# Patient Record
Sex: FEMALE | Race: WHITE | NOT HISPANIC OR LATINO | Employment: FULL TIME | URBAN - NONMETROPOLITAN AREA
[De-identification: names, ages, dates, MRNs, and addresses within clinical notes are randomized per-mention and may not be internally consistent; named-entity substitution may affect disease eponyms.]

---

## 2018-04-04 ENCOUNTER — OFFICE VISIT CONVERTED (OUTPATIENT)
Dept: FAMILY MEDICINE CLINIC | Age: 43
End: 2018-04-04
Attending: FAMILY MEDICINE

## 2018-06-18 ENCOUNTER — CONVERSION ENCOUNTER (OUTPATIENT)
Dept: OTHER | Facility: HOSPITAL | Age: 43
End: 2018-06-18

## 2018-06-27 ENCOUNTER — OFFICE VISIT CONVERTED (OUTPATIENT)
Dept: FAMILY MEDICINE CLINIC | Age: 43
End: 2018-06-27
Attending: FAMILY MEDICINE

## 2018-12-17 ENCOUNTER — OFFICE VISIT CONVERTED (OUTPATIENT)
Dept: FAMILY MEDICINE CLINIC | Age: 43
End: 2018-12-17
Attending: FAMILY MEDICINE

## 2019-04-16 ENCOUNTER — HOSPITAL ENCOUNTER (OUTPATIENT)
Dept: OTHER | Facility: HOSPITAL | Age: 44
Discharge: HOME | End: 2019-04-16
Attending: FAMILY MEDICINE

## 2019-04-23 ENCOUNTER — HOSPITAL ENCOUNTER (OUTPATIENT)
Dept: OTHER | Facility: HOSPITAL | Age: 44
Discharge: HOME | End: 2019-04-23
Attending: NURSE PRACTITIONER

## 2019-04-23 ENCOUNTER — OFFICE VISIT CONVERTED (OUTPATIENT)
Dept: FAMILY MEDICINE CLINIC | Age: 44
End: 2019-04-23
Attending: NURSE PRACTITIONER

## 2019-04-23 LAB
APPEARANCE UR: CLEAR
BACTERIA UR QL AUTO: NORMAL
BILIRUB UR QL: NEGATIVE
CASTS URNS QL MICRO: NORMAL /[LPF]
COLOR UR: YELLOW
CONV LEUKOCYTE ESTERASE: NEGATIVE
CONV UROBILINOGEN IN URINE BY AUTOMATED TEST STRIP: 0.2 {EHRLICHU}/DL (ref 0.1–1)
EPI CELLS #/AREA URNS HPF: NORMAL /[HPF]
GLUCOSE 24H UR-MCNC: NEGATIVE MG/DL
HGB UR QL STRIP: NEGATIVE
KETONES UR QL STRIP: NEGATIVE MG/DL
MUCOUS THREADS URNS QL MICRO: NORMAL
NITRITE UR-MCNC: NEGATIVE MG/ML
PH UR STRIP.AUTO: 6.5 [PH] (ref 5–8)
PROT UR-MCNC: NEGATIVE MG/DL
RBC # BLD AUTO: NORMAL /[HPF]
SP GR UR STRIP: 1.01 (ref 1–1.03)
SPECIMEN SOURCE: NORMAL
TSH SERPL-ACNC: 0.8 M[IU]/L (ref 0.27–4.2)
UNIDENT CRYS URNS QL MICRO: NORMAL /[HPF]
VIT B12 SERPL-MCNC: 279 PG/ML (ref 211–911)
WBC #/AREA URNS HPF: NORMAL /[HPF]

## 2019-04-24 LAB
C DIFF TOX B STL QL CT TISS CULT: NEGATIVE
CONV 027 TOXIN: NEGATIVE

## 2019-04-26 LAB — BACTERIA SPEC AEROBE CULT: NORMAL

## 2019-05-06 ENCOUNTER — HOSPITAL ENCOUNTER (OUTPATIENT)
Dept: OTHER | Facility: HOSPITAL | Age: 44
Discharge: HOME | End: 2019-05-06
Attending: NURSE PRACTITIONER

## 2019-06-04 ENCOUNTER — OFFICE VISIT CONVERTED (OUTPATIENT)
Dept: FAMILY MEDICINE CLINIC | Age: 44
End: 2019-06-04
Attending: NURSE PRACTITIONER

## 2019-06-04 ENCOUNTER — HOSPITAL ENCOUNTER (OUTPATIENT)
Dept: OTHER | Facility: HOSPITAL | Age: 44
Discharge: HOME | End: 2019-06-04
Attending: NURSE PRACTITIONER

## 2019-06-04 LAB
ALBUMIN SERPL-MCNC: 4.3 G/DL (ref 3.5–5)
ALBUMIN/GLOB SERPL: 1.7 {RATIO} (ref 1.4–2.6)
ALP SERPL-CCNC: 58 U/L (ref 42–98)
ALT SERPL-CCNC: 8 U/L (ref 10–40)
ANION GAP SERPL CALC-SCNC: 16 MMOL/L (ref 8–19)
AST SERPL-CCNC: 9 U/L (ref 15–50)
BASOPHILS # BLD AUTO: 0.04 10*3/UL (ref 0–0.2)
BASOPHILS NFR BLD AUTO: 0.4 % (ref 0–3)
BILIRUB SERPL-MCNC: <0.15 MG/DL (ref 0.2–1.3)
BUN SERPL-MCNC: 9 MG/DL (ref 5–25)
BUN/CREAT SERPL: 13 {RATIO} (ref 6–20)
CALCIUM SERPL-MCNC: 9.2 MG/DL (ref 8.7–10.4)
CHLORIDE SERPL-SCNC: 104 MMOL/L (ref 99–111)
CONV ABS IMM GRAN: 0.03 10*3/UL (ref 0–0.2)
CONV CO2: 25 MMOL/L (ref 22–32)
CONV IMMATURE GRAN: 0.3 % (ref 0–1.8)
CONV TOTAL PROTEIN: 6.9 G/DL (ref 6.3–8.2)
CREAT UR-MCNC: 0.7 MG/DL (ref 0.5–0.9)
DEPRECATED RDW RBC AUTO: 44.8 FL (ref 36.4–46.3)
EOSINOPHIL # BLD AUTO: 0.16 10*3/UL (ref 0–0.7)
EOSINOPHIL # BLD AUTO: 1.6 % (ref 0–7)
ERYTHROCYTE [DISTWIDTH] IN BLOOD BY AUTOMATED COUNT: 12.5 % (ref 11.7–14.4)
GFR SERPLBLD BASED ON 1.73 SQ M-ARVRAT: >60 ML/MIN/{1.73_M2}
GLOBULIN UR ELPH-MCNC: 2.6 G/DL (ref 2–3.5)
GLUCOSE SERPL-MCNC: 93 MG/DL (ref 65–99)
HBA1C MFR BLD: 12.3 G/DL (ref 12–16)
HCT VFR BLD AUTO: 38.8 % (ref 37–47)
LYMPHOCYTES # BLD AUTO: 4.69 10*3/UL (ref 1–5)
MCH RBC QN AUTO: 30.8 PG (ref 27–31)
MCHC RBC AUTO-ENTMCNC: 31.7 G/DL (ref 33–37)
MCV RBC AUTO: 97.2 FL (ref 81–99)
MONOCYTES # BLD AUTO: 0.82 10*3/UL (ref 0.2–1.2)
MONOCYTES NFR BLD AUTO: 8.1 % (ref 3–10)
NEUTROPHILS # BLD AUTO: 4.33 10*3/UL (ref 2–8)
NEUTROPHILS NFR BLD AUTO: 43 % (ref 30–85)
NRBC CBCN: 0 % (ref 0–0.7)
OSMOLALITY SERPL CALC.SUM OF ELEC: 290 MOSM/KG (ref 273–304)
PLATELET # BLD AUTO: 296 10*3/UL (ref 130–400)
PMV BLD AUTO: 11.4 FL (ref 9.4–12.3)
POTASSIUM SERPL-SCNC: 3.6 MMOL/L (ref 3.5–5.3)
RBC # BLD AUTO: 3.99 10*6/UL (ref 4.2–5.4)
SODIUM SERPL-SCNC: 141 MMOL/L (ref 135–147)
VARIANT LYMPHS NFR BLD MANUAL: 46.6 % (ref 20–45)
WBC # BLD AUTO: 10.07 10*3/UL (ref 4.8–10.8)

## 2019-06-10 ENCOUNTER — OFFICE VISIT CONVERTED (OUTPATIENT)
Dept: FAMILY MEDICINE CLINIC | Age: 44
End: 2019-06-10
Attending: FAMILY MEDICINE

## 2019-06-17 ENCOUNTER — CONVERSION ENCOUNTER (OUTPATIENT)
Dept: SURGERY | Facility: CLINIC | Age: 44
End: 2019-06-17

## 2019-06-17 ENCOUNTER — OFFICE VISIT CONVERTED (OUTPATIENT)
Dept: SURGERY | Facility: CLINIC | Age: 44
End: 2019-06-17
Attending: NURSE PRACTITIONER

## 2019-06-20 ENCOUNTER — OFFICE VISIT CONVERTED (OUTPATIENT)
Dept: FAMILY MEDICINE CLINIC | Age: 44
End: 2019-06-20
Attending: FAMILY MEDICINE

## 2019-06-21 ENCOUNTER — HOSPITAL ENCOUNTER (OUTPATIENT)
Dept: SURGERY | Facility: CLINIC | Age: 44
Discharge: HOME | End: 2019-06-21
Attending: NURSE PRACTITIONER

## 2019-06-22 LAB
C DIFF TOX B STL QL CT TISS CULT: NEGATIVE
CONV 027 TOXIN: NEGATIVE

## 2019-07-18 ENCOUNTER — HOSPITAL ENCOUNTER (OUTPATIENT)
Dept: GASTROENTEROLOGY | Facility: HOSPITAL | Age: 44
Setting detail: HOSPITAL OUTPATIENT SURGERY
Discharge: HOME | End: 2019-07-18
Attending: SURGERY

## 2019-08-05 ENCOUNTER — OFFICE VISIT CONVERTED (OUTPATIENT)
Dept: SURGERY | Facility: CLINIC | Age: 44
End: 2019-08-05
Attending: NURSE PRACTITIONER

## 2019-08-05 ENCOUNTER — CONVERSION ENCOUNTER (OUTPATIENT)
Dept: SURGERY | Facility: CLINIC | Age: 44
End: 2019-08-05

## 2019-08-20 ENCOUNTER — OFFICE VISIT CONVERTED (OUTPATIENT)
Dept: FAMILY MEDICINE CLINIC | Age: 44
End: 2019-08-20
Attending: NURSE PRACTITIONER

## 2019-08-25 ENCOUNTER — APPOINTMENT (OUTPATIENT)
Dept: CARDIOLOGY | Facility: HOSPITAL | Age: 44
End: 2019-08-25

## 2019-08-25 ENCOUNTER — APPOINTMENT (OUTPATIENT)
Dept: GENERAL RADIOLOGY | Facility: HOSPITAL | Age: 44
End: 2019-08-25

## 2019-08-25 ENCOUNTER — HOSPITAL ENCOUNTER (EMERGENCY)
Facility: HOSPITAL | Age: 44
Discharge: HOME | End: 2019-08-25
Attending: EMERGENCY MEDICINE | Admitting: EMERGENCY MEDICINE

## 2019-08-25 VITALS
DIASTOLIC BLOOD PRESSURE: 73 MMHG | OXYGEN SATURATION: 98 % | SYSTOLIC BLOOD PRESSURE: 108 MMHG | TEMPERATURE: 97.9 F | WEIGHT: 156 LBS | RESPIRATION RATE: 16 BRPM | HEART RATE: 72 BPM | BODY MASS INDEX: 25.07 KG/M2 | HEIGHT: 66 IN

## 2019-08-25 DIAGNOSIS — R07.89 ATYPICAL CHEST PAIN: ICD-10-CM

## 2019-08-25 DIAGNOSIS — R00.2 PALPITATION: Primary | ICD-10-CM

## 2019-08-25 LAB
ALBUMIN SERPL-MCNC: 4.5 G/DL (ref 3.5–5.2)
ALBUMIN/GLOB SERPL: 1.7 G/DL
ALP SERPL-CCNC: 71 U/L (ref 39–117)
ALT SERPL W P-5'-P-CCNC: 8 U/L (ref 1–33)
ANION GAP SERPL CALCULATED.3IONS-SCNC: 9.3 MMOL/L (ref 5–15)
AST SERPL-CCNC: 13 U/L (ref 1–32)
BASOPHILS # BLD AUTO: 0.03 10*3/MM3 (ref 0–0.2)
BASOPHILS NFR BLD AUTO: 0.3 % (ref 0–1.5)
BILIRUB SERPL-MCNC: <0.2 MG/DL (ref 0.2–1.2)
BUN BLD-MCNC: 7 MG/DL (ref 6–20)
BUN/CREAT SERPL: 10.1 (ref 7–25)
CALCIUM SPEC-SCNC: 8.9 MG/DL (ref 8.6–10.5)
CHLORIDE SERPL-SCNC: 105 MMOL/L (ref 98–107)
CO2 SERPL-SCNC: 25.7 MMOL/L (ref 22–29)
CREAT BLD-MCNC: 0.69 MG/DL (ref 0.57–1)
DEPRECATED RDW RBC AUTO: 43.8 FL (ref 37–54)
EOSINOPHIL # BLD AUTO: 0.13 10*3/MM3 (ref 0–0.4)
EOSINOPHIL NFR BLD AUTO: 1.4 % (ref 0.3–6.2)
ERYTHROCYTE [DISTWIDTH] IN BLOOD BY AUTOMATED COUNT: 12.6 % (ref 12.3–15.4)
GFR SERPL CREATININE-BSD FRML MDRD: 92 ML/MIN/1.73
GLOBULIN UR ELPH-MCNC: 2.7 GM/DL
GLUCOSE BLD-MCNC: 109 MG/DL (ref 65–99)
HCT VFR BLD AUTO: 39.5 % (ref 34–46.6)
HGB BLD-MCNC: 13 G/DL (ref 12–15.9)
HOLD SPECIMEN: NORMAL
HOLD SPECIMEN: NORMAL
IMM GRANULOCYTES # BLD AUTO: 0.02 10*3/MM3 (ref 0–0.05)
IMM GRANULOCYTES NFR BLD AUTO: 0.2 % (ref 0–0.5)
LYMPHOCYTES # BLD AUTO: 3.31 10*3/MM3 (ref 0.7–3.1)
LYMPHOCYTES NFR BLD AUTO: 36.2 % (ref 19.6–45.3)
MAGNESIUM SERPL-MCNC: 2.4 MG/DL (ref 1.6–2.6)
MCH RBC QN AUTO: 31.3 PG (ref 26.6–33)
MCHC RBC AUTO-ENTMCNC: 32.9 G/DL (ref 31.5–35.7)
MCV RBC AUTO: 95.2 FL (ref 79–97)
MONOCYTES # BLD AUTO: 0.75 10*3/MM3 (ref 0.1–0.9)
MONOCYTES NFR BLD AUTO: 8.2 % (ref 5–12)
NEUTROPHILS # BLD AUTO: 4.9 10*3/MM3 (ref 1.7–7)
NEUTROPHILS NFR BLD AUTO: 53.7 % (ref 42.7–76)
NRBC BLD AUTO-RTO: 0 /100 WBC (ref 0–0.2)
PLATELET # BLD AUTO: 296 10*3/MM3 (ref 140–450)
PMV BLD AUTO: 10.8 FL (ref 6–12)
POTASSIUM BLD-SCNC: 3.8 MMOL/L (ref 3.5–5.2)
PROT SERPL-MCNC: 7.2 G/DL (ref 6–8.5)
RBC # BLD AUTO: 4.15 10*6/MM3 (ref 3.77–5.28)
SODIUM BLD-SCNC: 140 MMOL/L (ref 136–145)
T4 FREE SERPL-MCNC: 1.25 NG/DL (ref 0.93–1.7)
TROPONIN T SERPL-MCNC: <0.01 NG/ML (ref 0–0.03)
TROPONIN T SERPL-MCNC: <0.01 NG/ML (ref 0–0.03)
TSH SERPL DL<=0.05 MIU/L-ACNC: 0.85 MIU/ML (ref 0.27–4.2)
WBC NRBC COR # BLD: 9.14 10*3/MM3 (ref 3.4–10.8)
WHOLE BLOOD HOLD SPECIMEN: NORMAL
WHOLE BLOOD HOLD SPECIMEN: NORMAL

## 2019-08-25 PROCEDURE — 93005 ELECTROCARDIOGRAM TRACING: CPT | Performed by: EMERGENCY MEDICINE

## 2019-08-25 PROCEDURE — 71046 X-RAY EXAM CHEST 2 VIEWS: CPT

## 2019-08-25 PROCEDURE — 93005 ELECTROCARDIOGRAM TRACING: CPT

## 2019-08-25 PROCEDURE — 84443 ASSAY THYROID STIM HORMONE: CPT | Performed by: EMERGENCY MEDICINE

## 2019-08-25 PROCEDURE — 93010 ELECTROCARDIOGRAM REPORT: CPT | Performed by: INTERNAL MEDICINE

## 2019-08-25 PROCEDURE — 84439 ASSAY OF FREE THYROXINE: CPT | Performed by: EMERGENCY MEDICINE

## 2019-08-25 PROCEDURE — 0296T HC EXT ECG > 48HR TO 21 DAY RCRD W/CONECT INTL RCRD: CPT

## 2019-08-25 PROCEDURE — 99283 EMERGENCY DEPT VISIT LOW MDM: CPT

## 2019-08-25 PROCEDURE — 84484 ASSAY OF TROPONIN QUANT: CPT | Performed by: EMERGENCY MEDICINE

## 2019-08-25 PROCEDURE — 80053 COMPREHEN METABOLIC PANEL: CPT | Performed by: EMERGENCY MEDICINE

## 2019-08-25 PROCEDURE — 83735 ASSAY OF MAGNESIUM: CPT | Performed by: EMERGENCY MEDICINE

## 2019-08-25 PROCEDURE — 85025 COMPLETE CBC W/AUTO DIFF WBC: CPT | Performed by: EMERGENCY MEDICINE

## 2019-08-25 RX ORDER — FLUOXETINE 10 MG/1
10 CAPSULE ORAL DAILY
COMMUNITY
End: 2021-08-24

## 2019-08-25 RX ORDER — LEVOTHYROXINE SODIUM 0.03 MG/1
25 TABLET ORAL DAILY
COMMUNITY
End: 2021-10-18 | Stop reason: ALTCHOICE

## 2019-08-25 RX ORDER — SODIUM CHLORIDE 0.9 % (FLUSH) 0.9 %
10 SYRINGE (ML) INJECTION AS NEEDED
Status: DISCONTINUED | OUTPATIENT
Start: 2019-08-25 | End: 2019-08-25 | Stop reason: HOSPADM

## 2019-08-25 RX ORDER — GABAPENTIN 100 MG/1
600 CAPSULE ORAL DAILY
COMMUNITY
End: 2021-09-01

## 2019-08-25 RX ORDER — OMEPRAZOLE 40 MG/1
40 CAPSULE, DELAYED RELEASE ORAL DAILY
COMMUNITY

## 2019-08-28 ENCOUNTER — CONVERSION ENCOUNTER (OUTPATIENT)
Dept: OTHER | Facility: HOSPITAL | Age: 44
End: 2019-08-28

## 2019-08-28 ENCOUNTER — OFFICE VISIT CONVERTED (OUTPATIENT)
Dept: CARDIOLOGY | Facility: CLINIC | Age: 44
End: 2019-08-28
Attending: INTERNAL MEDICINE

## 2019-08-31 ENCOUNTER — OFFICE VISIT CONVERTED (OUTPATIENT)
Dept: FAMILY MEDICINE CLINIC | Age: 44
End: 2019-08-31
Attending: NURSE PRACTITIONER

## 2019-09-04 ENCOUNTER — CONVERSION ENCOUNTER (OUTPATIENT)
Dept: CARDIOLOGY | Facility: CLINIC | Age: 44
End: 2019-09-04
Attending: INTERNAL MEDICINE

## 2019-09-11 ENCOUNTER — CONVERSION ENCOUNTER (OUTPATIENT)
Dept: CARDIOLOGY | Facility: CLINIC | Age: 44
End: 2019-09-11

## 2019-09-11 ENCOUNTER — OFFICE VISIT CONVERTED (OUTPATIENT)
Dept: CARDIOLOGY | Facility: CLINIC | Age: 44
End: 2019-09-11
Attending: INTERNAL MEDICINE

## 2019-09-16 ENCOUNTER — OFFICE VISIT CONVERTED (OUTPATIENT)
Dept: FAMILY MEDICINE CLINIC | Age: 44
End: 2019-09-16
Attending: NURSE PRACTITIONER

## 2019-09-17 ENCOUNTER — HOSPITAL ENCOUNTER (OUTPATIENT)
Dept: OTHER | Facility: HOSPITAL | Age: 44
Discharge: HOME | End: 2019-09-17

## 2019-09-17 LAB
FSH SERPL-ACNC: 7.7 M[IU]/ML
LH SERPL-ACNC: 8.3 M[IU]/ML
T4 FREE SERPL-MCNC: 1.3 NG/DL (ref 0.9–1.8)
TSH SERPL-ACNC: 0.51 M[IU]/L (ref 0.27–4.2)

## 2019-09-19 LAB — PROGEST SERPL-MCNC: 0.2 NG/ML

## 2019-09-21 LAB — CONV ESTROGENS, TOTAL, SERUM: 460 PG/ML

## 2019-09-24 ENCOUNTER — OFFICE VISIT CONVERTED (OUTPATIENT)
Dept: FAMILY MEDICINE CLINIC | Age: 44
End: 2019-09-24
Attending: FAMILY MEDICINE

## 2019-10-23 ENCOUNTER — OFFICE VISIT CONVERTED (OUTPATIENT)
Dept: CARDIOLOGY | Facility: CLINIC | Age: 44
End: 2019-10-23
Attending: INTERNAL MEDICINE

## 2019-10-23 ENCOUNTER — CONVERSION ENCOUNTER (OUTPATIENT)
Dept: CARDIOLOGY | Facility: CLINIC | Age: 44
End: 2019-10-23

## 2019-11-07 ENCOUNTER — OFFICE VISIT CONVERTED (OUTPATIENT)
Dept: FAMILY MEDICINE CLINIC | Age: 44
End: 2019-11-07
Attending: FAMILY MEDICINE

## 2019-11-14 ENCOUNTER — OFFICE VISIT CONVERTED (OUTPATIENT)
Dept: FAMILY MEDICINE CLINIC | Age: 44
End: 2019-11-14
Attending: FAMILY MEDICINE

## 2019-11-20 ENCOUNTER — HOSPITAL ENCOUNTER (OUTPATIENT)
Dept: OTHER | Facility: HOSPITAL | Age: 44
Discharge: HOME | End: 2019-11-20
Attending: FAMILY MEDICINE

## 2019-11-20 LAB
T4 FREE SERPL-MCNC: 1 NG/DL (ref 0.9–1.8)
TSH SERPL-ACNC: 0.92 M[IU]/L (ref 0.27–4.2)

## 2019-11-22 LAB — T3 SERPL-MCNC: 116 NG/DL (ref 71–180)

## 2020-01-13 ENCOUNTER — HOSPITAL ENCOUNTER (OUTPATIENT)
Dept: OTHER | Facility: HOSPITAL | Age: 45
Discharge: HOME | End: 2020-01-13
Attending: FAMILY MEDICINE

## 2020-02-07 ENCOUNTER — OFFICE VISIT CONVERTED (OUTPATIENT)
Dept: FAMILY MEDICINE CLINIC | Age: 45
End: 2020-02-07
Attending: FAMILY MEDICINE

## 2020-02-13 ENCOUNTER — HOSPITAL ENCOUNTER (OUTPATIENT)
Dept: OTHER | Facility: HOSPITAL | Age: 45
Discharge: HOME | End: 2020-02-13
Attending: FAMILY MEDICINE

## 2020-02-13 LAB
ALBUMIN SERPL-MCNC: 4 G/DL (ref 3.5–5)
ALBUMIN/GLOB SERPL: 1.5 {RATIO} (ref 1.4–2.6)
ALP SERPL-CCNC: 53 U/L (ref 42–98)
ALT SERPL-CCNC: 15 U/L (ref 10–40)
ANION GAP SERPL CALC-SCNC: 14 MMOL/L (ref 8–19)
AST SERPL-CCNC: 14 U/L (ref 15–50)
BILIRUB SERPL-MCNC: 0.21 MG/DL (ref 0.2–1.3)
BUN SERPL-MCNC: 10 MG/DL (ref 5–25)
BUN/CREAT SERPL: 13 {RATIO} (ref 6–20)
CALCIUM SERPL-MCNC: 9.3 MG/DL (ref 8.7–10.4)
CHLORIDE SERPL-SCNC: 101 MMOL/L (ref 99–111)
CHOLEST SERPL-MCNC: 195 MG/DL (ref 107–200)
CHOLEST/HDLC SERPL: 3 {RATIO} (ref 3–6)
CONV CO2: 24 MMOL/L (ref 22–32)
CONV TOTAL PROTEIN: 6.6 G/DL (ref 6.3–8.2)
CREAT UR-MCNC: 0.75 MG/DL (ref 0.5–0.9)
GFR SERPLBLD BASED ON 1.73 SQ M-ARVRAT: >60 ML/MIN/{1.73_M2}
GLOBULIN UR ELPH-MCNC: 2.6 G/DL (ref 2–3.5)
GLUCOSE SERPL-MCNC: 112 MG/DL (ref 65–99)
HDLC SERPL-MCNC: 64 MG/DL (ref 40–60)
LDLC SERPL CALC-MCNC: 117 MG/DL (ref 70–100)
OSMOLALITY SERPL CALC.SUM OF ELEC: 280 MOSM/KG (ref 273–304)
POTASSIUM SERPL-SCNC: 4.2 MMOL/L (ref 3.5–5.3)
SODIUM SERPL-SCNC: 135 MMOL/L (ref 135–147)
TRIGL SERPL-MCNC: 72 MG/DL (ref 40–150)
TSH SERPL-ACNC: 0.95 M[IU]/L (ref 0.27–4.2)
VLDLC SERPL-MCNC: 14 MG/DL (ref 5–37)

## 2020-05-14 ENCOUNTER — OFFICE VISIT CONVERTED (OUTPATIENT)
Dept: FAMILY MEDICINE CLINIC | Age: 45
End: 2020-05-14
Attending: FAMILY MEDICINE

## 2020-07-10 ENCOUNTER — HOSPITAL ENCOUNTER (OUTPATIENT)
Dept: OTHER | Facility: HOSPITAL | Age: 45
Discharge: HOME | End: 2020-07-10
Attending: NURSE PRACTITIONER

## 2020-07-10 ENCOUNTER — OFFICE VISIT CONVERTED (OUTPATIENT)
Dept: FAMILY MEDICINE CLINIC | Age: 45
End: 2020-07-10
Attending: NURSE PRACTITIONER

## 2020-07-12 LAB
AMOXICILLIN+CLAV SUSC ISLT: >=32
AMPICILLIN SUSC ISLT: >=32
AMPICILLIN+SULBAC SUSC ISLT: >=32
BACTERIA UR CULT: ABNORMAL
CEFAZOLIN SUSC ISLT: 8
CEFEPIME SUSC ISLT: <=1
CEFTAZIDIME SUSC ISLT: <=1
CEFTRIAXONE SUSC ISLT: <=1
CEFUROXIME ORAL SUSC ISLT: 4
CEFUROXIME PARENTER SUSC ISLT: 4
CIPROFLOXACIN SUSC ISLT: <=0.25
ERTAPENEM SUSC ISLT: <=0.5
GENTAMICIN SUSC ISLT: >=16
LEVOFLOXACIN SUSC ISLT: <=0.12
NITROFURANTOIN SUSC ISLT: <=16
TETRACYCLINE SUSC ISLT: >=16
TMP SMX SUSC ISLT: >=320
TOBRAMYCIN SUSC ISLT: 8

## 2020-08-07 ENCOUNTER — OFFICE VISIT CONVERTED (OUTPATIENT)
Dept: FAMILY MEDICINE CLINIC | Age: 45
End: 2020-08-07
Attending: FAMILY MEDICINE

## 2020-10-23 ENCOUNTER — OFFICE VISIT CONVERTED (OUTPATIENT)
Dept: FAMILY MEDICINE CLINIC | Age: 45
End: 2020-10-23
Attending: NURSE PRACTITIONER

## 2020-12-23 ENCOUNTER — OFFICE VISIT CONVERTED (OUTPATIENT)
Dept: FAMILY MEDICINE CLINIC | Age: 45
End: 2020-12-23
Attending: NURSE PRACTITIONER

## 2021-02-08 ENCOUNTER — HOSPITAL ENCOUNTER (OUTPATIENT)
Dept: OTHER | Facility: HOSPITAL | Age: 46
Discharge: HOME | End: 2021-02-08
Attending: FAMILY MEDICINE

## 2021-02-10 ENCOUNTER — OFFICE VISIT CONVERTED (OUTPATIENT)
Dept: FAMILY MEDICINE CLINIC | Age: 46
End: 2021-02-10
Attending: FAMILY MEDICINE

## 2021-02-10 LAB — SARS-COV-2 RNA SPEC QL NAA+PROBE: DETECTED

## 2021-03-03 ENCOUNTER — OFFICE VISIT CONVERTED (OUTPATIENT)
Dept: FAMILY MEDICINE CLINIC | Age: 46
End: 2021-03-03
Attending: NURSE PRACTITIONER

## 2021-04-13 ENCOUNTER — OFFICE VISIT CONVERTED (OUTPATIENT)
Dept: FAMILY MEDICINE CLINIC | Age: 46
End: 2021-04-13
Attending: NURSE PRACTITIONER

## 2021-04-13 ENCOUNTER — HOSPITAL ENCOUNTER (OUTPATIENT)
Dept: OTHER | Facility: HOSPITAL | Age: 46
Discharge: HOME | End: 2021-04-13
Attending: NURSE PRACTITIONER

## 2021-04-13 LAB
ALBUMIN SERPL-MCNC: 4.2 G/DL (ref 3.5–5)
ALBUMIN/GLOB SERPL: 1.4 {RATIO} (ref 1.4–2.6)
ALP SERPL-CCNC: 60 U/L (ref 42–98)
ALT SERPL-CCNC: 12 U/L (ref 10–40)
ANION GAP SERPL CALC-SCNC: 16 MMOL/L (ref 8–19)
APPEARANCE UR: CLEAR
AST SERPL-CCNC: 14 U/L (ref 15–50)
BACTERIA UR QL AUTO: ABNORMAL
BILIRUB SERPL-MCNC: 0.19 MG/DL (ref 0.2–1.3)
BILIRUB UR QL: NEGATIVE
BUN SERPL-MCNC: 8 MG/DL (ref 5–25)
BUN/CREAT SERPL: 11 {RATIO} (ref 6–20)
CALCIUM SERPL-MCNC: 9 MG/DL (ref 8.7–10.4)
CASTS URNS QL MICRO: ABNORMAL /[LPF]
CHLORIDE SERPL-SCNC: 104 MMOL/L (ref 99–111)
COLOR UR: YELLOW
CONV CO2: 23 MMOL/L (ref 22–32)
CONV LEUKOCYTE ESTERASE: NEGATIVE
CONV TOTAL PROTEIN: 7.1 G/DL (ref 6.3–8.2)
CONV UROBILINOGEN IN URINE BY AUTOMATED TEST STRIP: 0.2 {EHRLICHU}/DL (ref 0.1–1)
CREAT UR-MCNC: 0.76 MG/DL (ref 0.5–0.9)
EPI CELLS #/AREA URNS HPF: ABNORMAL /[HPF]
ERYTHROCYTE [DISTWIDTH] IN BLOOD BY AUTOMATED COUNT: 13 % (ref 11.5–14.5)
GFR SERPLBLD BASED ON 1.73 SQ M-ARVRAT: >60 ML/MIN/{1.73_M2}
GLOBULIN UR ELPH-MCNC: 2.9 G/DL (ref 2–3.5)
GLUCOSE 24H UR-MCNC: NEGATIVE MG/DL
GLUCOSE SERPL-MCNC: 94 MG/DL (ref 65–99)
HBA1C MFR BLD: 13.1 G/DL (ref 12–16)
HCT VFR BLD AUTO: 40.4 % (ref 37–47)
HGB UR QL STRIP: NEGATIVE
KETONES UR QL STRIP: NEGATIVE MG/DL
MCH RBC QN AUTO: 30.8 PG (ref 27–31)
MCHC RBC AUTO-ENTMCNC: 32.4 G/DL (ref 33–37)
MCV RBC AUTO: 95.1 FL (ref 81–99)
MUCOUS THREADS URNS QL MICRO: ABNORMAL
NITRITE UR-MCNC: NEGATIVE MG/ML
OSMOLALITY SERPL CALC.SUM OF ELEC: 286 MOSM/KG (ref 273–304)
PH UR STRIP.AUTO: 6 [PH] (ref 5–8)
PLATELET # BLD AUTO: 296 10*3/UL (ref 130–400)
PMV BLD AUTO: 10.4 FL (ref 7.4–10.4)
POTASSIUM SERPL-SCNC: 4 MMOL/L (ref 3.5–5.3)
PROT UR-MCNC: NEGATIVE MG/DL
RBC # BLD AUTO: 4.25 10*6/UL (ref 4.2–5.4)
RBC # BLD AUTO: ABNORMAL /[HPF]
SODIUM SERPL-SCNC: 139 MMOL/L (ref 135–147)
SP GR UR STRIP: 1.01 (ref 1–1.03)
SPECIMEN SOURCE: ABNORMAL
UNIDENT CRYS URNS QL MICRO: ABNORMAL /[HPF]
WBC # BLD AUTO: 9.48 10*3/UL (ref 4.8–10.8)
WBC #/AREA URNS HPF: ABNORMAL /[HPF]

## 2021-05-15 VITALS
HEIGHT: 65 IN | HEART RATE: 80 BPM | BODY MASS INDEX: 25.01 KG/M2 | DIASTOLIC BLOOD PRESSURE: 82 MMHG | WEIGHT: 150.12 LBS | SYSTOLIC BLOOD PRESSURE: 118 MMHG

## 2021-05-15 VITALS
HEIGHT: 65 IN | SYSTOLIC BLOOD PRESSURE: 108 MMHG | DIASTOLIC BLOOD PRESSURE: 85 MMHG | BODY MASS INDEX: 25.01 KG/M2 | WEIGHT: 150.12 LBS | HEART RATE: 82 BPM

## 2021-05-15 VITALS
DIASTOLIC BLOOD PRESSURE: 73 MMHG | SYSTOLIC BLOOD PRESSURE: 114 MMHG | WEIGHT: 157 LBS | BODY MASS INDEX: 26.16 KG/M2 | HEIGHT: 65 IN | HEART RATE: 81 BPM

## 2021-05-15 VITALS — HEIGHT: 66 IN | WEIGHT: 149 LBS | BODY MASS INDEX: 23.95 KG/M2 | RESPIRATION RATE: 14 BRPM

## 2021-05-15 VITALS — BODY MASS INDEX: 24.03 KG/M2 | RESPIRATION RATE: 14 BRPM | WEIGHT: 149.5 LBS | HEIGHT: 66 IN

## 2021-05-18 NOTE — PROGRESS NOTES
BritneyGabriela fraserLili 1975     Office/Outpatient Visit    Visit Date: Mon, Sep 16, 2019 11:09 am    Provider: Merle Brunson N.P. (Assistant: Addis Wilson MA)    Location: [facility]        Electronically signed by Merle Brunson N.P. on  09/17/2019 08:44:23 AM                             SUBJECTIVE:        CC:     Mrs. Riggs is a 43 year old White female.  Patient presents today for follow up medications (not taking Bystolic, Metoprolol, Fluoxetine);         HPI:         PHQ-9 Depression Screening: Completed form scanned and in chart; Total Score 4         Tachycardia: Pt states Dr. Miguel and took her off meds. He reviewed her holter and stated it was all ok except elevated heart rate. He wondered about prozac causing her symptoms.          With regard to the anxiety, NOS, pt states dealing with anxiety. States prozac caused her tachycardia. She would like to discuss options. She knows she needs some med d/t her current stress. She also has an upcoming appt with Mai Finley, counseling.  She also wonders if her hormones could be causing her symptoms and would like that checked.     ROS:     CONSTITUTIONAL:  Negative for chills, fatigue, fever, and weight change.      EYES:  Negative for blurred vision.      CARDIOVASCULAR:  Negative for chest pain, orthopnea, paroxysmal nocturnal dyspnea and pedal edema.      RESPIRATORY:  Negative for dyspnea.      GASTROINTESTINAL:  Negative for abdominal pain, constipation, diarrhea, nausea and vomiting.      NEUROLOGICAL:  Negative for dizziness, headaches, paresthesias, and weakness.      PSYCHIATRIC:  Positive for anxiety.          PMH/FMH/SH:     Last Reviewed on 9/02/2019 12:38 PM by Merle Brunson    Past Medical History:                 PAST MEDICAL HISTORY         Hypothyroidism         PREVENTIVE HEALTH MAINTENANCE             COLORECTAL CANCER SCREENING: Up to date (colonoscopy q10y; sigmoidoscopy q5y; Cologuard q3y) was last done  "7/18/2019, Results are in chart; colonoscopy with normal results     MAMMOGRAM: Done within last 2 years and results in are chart was last done 6/20/2018 with normal results     PAP SMEAR: was last done s/p total hysterectomy         Surgical History:         Cholecystectomy: 2014;     Hysterectomy: 2000; uterus only;     Tubal Ligation: at age in her 20s;      L breast lumpectomy - benign;         Family History:     Father: Type 1 Diabetes     Mother: Healthy     Maternal Grandmother: \"thyroid issues and nodules\"         Social History:     Occupation:  at [employer];     Marital Status:      Children: 2 children         Tobacco/Alcohol/Supplements:     Last Reviewed on 9/02/2019 12:38 PM by Merle Brunson    Tobacco: Current Smoker: She currently smokes every day, 1 pack per day; (start age 18 pack-year history).          Substance Abuse History:     Last Reviewed on 9/02/2019 12:38 PM by Merle Brunson        Mental Health History:     Last Reviewed on 9/02/2019 12:38 PM by Merle Brunson        Communicable Diseases (eg STDs):     Last Reviewed on 9/02/2019 12:38 PM by Merle Brunson            Current Problems:     Last Reviewed on 9/02/2019 12:38 PM by Merle Brunson    Tachycardia, NOS     Abdominal pain, unspecified     Fatigue     Use of high risk medications     Chronic low back pain     Screening for lipoid disorders     Hypothyroidism     Allergies     Anxiety, NOS         Immunizations:     Fluzone (3 + years dose) 10/1/2016     Fluzone (3 + years dose) 11/1/2017     Fluzone Quadrivalent (3+ years) 12/17/2018         Allergies:     Last Reviewed on 9/02/2019 12:38 PM by Merle Brunson      No Known Drug Allergies.         Current Medications:     Last Reviewed on 9/02/2019 12:38 PM by Merle Brunson    Bystolic 2.5mg Tablet Take 1 tablet(s) by mouth daily     Gabapentin 100mg Capsules Give 2 capsules by mouth 3 times daily     Ventolin HFA 90mcg/1actuation " Oral Inhaler Inhale 2 puff(s) by mouth 4 times a day as needed     Synthroid 0.025mg Tablet Take 1 tablet(s) by mouth daily     Fluoxetine 20mg Tablet 1 tab daily     Metoprolol Succinate 25mg Tablets, Extended Release take 1/2 tab at bedtime     Flonase Allergy Relief 50mcg/1actuation Nasal Spray 1 spray each nostril bid prn     Fexofenadine HCl 180mg Tablet 1 tab daily         OBJECTIVE:        Vitals:         Current: 9/16/2019 11:13:43 AM    Ht:  5 ft, 5 in;  Wt: 149.8 lbs;  BMI: 24.9    T: 97.7 F (oral);  BP: 115/72 mm Hg (left arm, sitting);  P: 83 bpm (left arm (BP Cuff), sitting);  sCr: 0.7 mg/dL;  GFR: 101.42        Exams:     PHYSICAL EXAM:     GENERAL: vital signs recorded - well developed, well nourished;  no apparent distress;     EYES: extraocular movements intact; conjunctiva and cornea are normal; PERRLA;     NECK: range of motion is normal; thyroid is non-palpable;     RESPIRATORY: normal respiratory rate and pattern with no distress; normal breath sounds with no rales, rhonchi, wheezes or rubs;     CARDIOVASCULAR: normal rate; rhythm is regular;  no systolic murmur; no edema;     NEUROLOGICAL:  cranial nerves, motor and sensory function, reflexes, gait and coordination are all intact;     PSYCHIATRIC:  appropriate affect and demeanor; normal speech pattern; grossly normal memory;         ASSESSMENT:           V79.0   Z13.89  Screening for depression              DDx:     785.0   R00.0  Tachycardia, NOS              DDx:     300.00   F41.9  Anxiety, NOS              DDx:         ORDERS:         Meds Prescribed:       Lexapro (Escitalopram Oxalate) 10mg Tablet 1 tab daily  #30 (Thirty) tablet(s) Refills: 2       Buspirone HCl 15mg Tablet 1 PO BID PRN  #60 (Sixty) tablet(s) Refills: 1         Lab Orders:       FUTURE  Future order to be done at patients convenience  (Send-Out)         73866  ESTOT - [facility] Estrogens, total  (Send-Out)         76986  FSH - [facility] Follicle stimulating hormone  (Send-Out)          73579  LH - [facility] Luteinizing hormone (LH)  (Send-Out)         34694  PROGS - [facility] Progesterone  (Send-Out)         50032  THYII - [facility] Thyroid panel with TSH (85009, 59074)  (Send-Out)                   PLAN:          Tachycardia, NOS           Prescriptions: F/U with cardiology as needed.          Anxiety, NOS F/U with pcp as scheduled.         FOLLOW-UP TESTING #1: FOLLOW-UP LABORATORY:  Labs to be scheduled in the future include Estrogen total, FSH, Luteinizing hormone, Progesterone, and Thyroid Panel.            Prescriptions:       Lexapro (Escitalopram Oxalate) 10mg Tablet 1 tab daily  #30 (Thirty) tablet(s) Refills: 2       Buspirone HCl 15mg Tablet 1 PO BID PRN  #60 (Sixty) tablet(s) Refills: 1           Orders:       FUTURE  Future order to be done at patients convenience  (Send-Out)         06455  ESTOT - [facility] Estrogens, total  (Send-Out)         70471  FSH - [facility] Follicle stimulating hormone  (Send-Out)         03316  LH - [facility] Luteinizing hormone (LH)  (Send-Out)         72923  PROGS - [facility] Progesterone  (Send-Out)         59174  THYII - [facility] Thyroid panel with TSH (29662, 72565)  (Send-Out)               Patient Recommendations:        For  Anxiety, NOS:             The following laboratory testing has been ordered:             CHARGE CAPTURE:           Primary Diagnosis:     V79.0 Screening for depression            Z13.89    Encounter for screening for other disorder              Orders:          71286   Office/outpatient visit; established patient, level 4  (In-House)           785.0 Tachycardia, NOS            R00.0    Tachycardia, unspecified    300.00 Anxiety, NOS            F41.9    Anxiety disorder, unspecified

## 2021-05-18 NOTE — PROGRESS NOTES
Gabriela Riggs  1975     Office/Outpatient Visit    Visit Date: Wed, Feb 10, 2021 01:47 pm    Provider: Candis Carey MD (Assistant: Alba Cortes LPN)    Location: [facility]        Electronically signed by Candis Carey MD on  02/10/2021 02:24:54 PM                             Subjective:        CC: Mrs. Riggs is a 45 year old White female.  This is a follow-up visit.  [phone];         HPI: Gabriela is here for routine f/u on chronic issues.  Her  tested positive for COVID and just got bamlan infusion this morning.  Gabriela has had sx of fatigue and headache for the past 4 days.  Sx resolve with Tylenol pretty well.  She got retested Monday for COVID and that test is pending.        She is on Lexapro and buspirone for anxiety.  Her main source of stress is that her  has been struggling with idiopathic pulmonary fibrosis.  Her mood is better.  Her anxiety is improved.          She is on gabapentin for chronic low back pain.  This does work well to control her pain.  It also helps with her anxiety and depression.  No adverse effects.  She is UTD on controlled substance monitoring.        She is no longer taking Synthroid for hypothyroidism.  No heat/cold intolerance, no changes in hair or skin.  TSH has been WNL.    ROS:     CONSTITUTIONAL:  Positive for fatigue.   Negative for chills or fever.      EYES:  Negative for blurred vision.      E/N/T:  Negative for diminished hearing and nasal congestion.      CARDIOVASCULAR:  Negative for chest pain.      RESPIRATORY:  Negative for recent cough and dyspnea.      GASTROINTESTINAL:  Negative for abdominal pain, constipation, diarrhea, nausea and vomiting.      MUSCULOSKELETAL:  Negative for arthralgias and myalgias.      NEUROLOGICAL:  Positive for headaches.   Negative for paresthesias or weakness.      PSYCHIATRIC:  Negative for anxiety, depression and sleep disturbance.          Past Medical History  "/ Family History / Social History:         Last Reviewed on 2/10/2021 02:19 PM by Candis Carey    Past Medical History:                 PAST MEDICAL HISTORY         Hypothyroidism         PREVENTIVE HEALTH MAINTENANCE             COLORECTAL CANCER SCREENING: Up to date (colonoscopy q10y; sigmoidoscopy q5y; Cologuard q3y) was last done 7/18/2019, Results are in chart; colonoscopy with normal results     MAMMOGRAM: Done within last 2 years and results in are chart was last done 1/13/2020 with normal results     PAP SMEAR: was last done s/p total hysterectomy         Surgical History:         Cholecystectomy: 2014;     Hysterectomy: 2000; uterus only;     Tubal Ligation: at age in her 20s;     L breast lumpectomy - benign;         Family History:     Father: Type 1 Diabetes     Mother: Healthy     Maternal Grandmother: \"thyroid issues and nodules\"         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          Substance Abuse History:     Last Reviewed on 2/10/2021 02:19 PM by Candis Carey        Mental Health History:     Last Reviewed on 2/10/2021 02:19 PM by Candis Carey        Communicable Diseases (eg STDs):     Last Reviewed on 2/10/2021 02:19 PM by Candis Carey        Current Problems:     Last Reviewed on 12/23/2020 09:24 AM by Yajaira Sandoval    Hypothyroidism, unspecified    Other fatigue    Other long term (current) drug therapy    Low back pain    Generalized abdominal pain    Allergic rhinitis due to pollen    Tachycardia, unspecified    Anxiety disorder, unspecified    Depression - Major depressive disorder, recurrent, moderate    Contact with and (suspected) exposure to other viral communicable " diseases    Other microscopic hematuria    Encounter for general adult medical examination without abnormal findings    Encounter for immunization    Cervicalgia    Acute sinusitis, unspecified    Nicotine dependence, unspecified, uncomplicated    Encounter for other screening for malignant neoplasm of breast        Immunizations:     Influenza, injectable, MDCK, preservative free, quadrivalent 10/2/2020    Tdap (ADACEL TDAP) 8/7/2020    Fluzone (3 + years dose) 10/1/2016    Fluzone (3 + years dose) 11/1/2017    Fluzone Quadrivalent (3+ years) 12/17/2018        Allergies:     Last Reviewed on 12/23/2020 09:24 AM by Yajaira Sandoval    Fluoxetine: tachycardia         Current Medications:     Last Reviewed on 12/23/2020 09:15 AM by Kandi Carcamo    cetirizine 10 mg oral tablet [take 1 tablet (10 mg) by oral route once daily]    Flonase Allergy Relief 50 mcg/actuation Intranasal Spray, Suspension [1 spray each nostril bid prn]    escitalopram oxalate 10 mg oral tablet [take ONE tablet by MOUTH ONCE daily]    gabapentin 100 mg oral capsule [take 1 capsule (100 mg) by oral route 2 times per day]    baclofen 10 mg oral tablet [take 1/2 to 1 tablet bid prn]    buPROPion  mg oral tablet, sustained-release 12 hr [take 1 tablet (150 mg) by oral route 2 times per day]    Augmentin 875-125 mg oral tablet [take 1 tablet by oral route every 12 hours]        Objective:        Exams:     PHYSICAL EXAM:     GENERAL:  well developed and nourished; appropriately groomed; in no apparent distress;     EYES: extraocular movements intact; conjunctiva and cornea are normal;     RESPIRATORY: normal respiratory rate and pattern with no distress;     MUSCULOSKELETAL: normal overall tone     NEUROLOGIC: mental status: alert and oriented x 3;     PSYCHIATRIC: appropriate affect and demeanor; normal psychomotor function; normal speech pattern;         Assessment:         M54.5   Low back pain       Z79.899   Other long term (current)  drug therapy       F41.9   Anxiety disorder, unspecified       E03.9   Hypothyroidism, unspecified           ORDERS:         Meds Prescribed:       [Refilled] escitalopram oxalate 10 mg oral tablet [take ONE tablet by MOUTH ONCE daily], #90 (ninety) tablets, Refills: 1 (one)       [Refilled] gabapentin 100 mg oral capsule [take 1 capsule (100 mg) by oral route 2 times per day], #180 (one hundred and eighty) capsules, Refills: 1 (one)         Lab Orders:       APPTO  Appointment need  (In-House)                      Plan:         Low back painStable on current regimen.  Sx well controlled.  No adverse effects.  She does require ongoing use of this controlled substance to function.  Prior tox screen appropriate.  Julius run today.  Refills needed.  RTC 6 months for PEx.    [facility] Telehealth: Verbal consent obtained for visit to occur via televideo conferencing; Staff, other than provider, present during telephone visit include Alba Cortes LPN     FOLLOW-UP: Schedule a follow-up visit in 6 months.:.            Prescriptions:       [Refilled] gabapentin 100 mg oral capsule [take 1 capsule (100 mg) by oral route 2 times per day], #180 (one hundred and eighty) capsules, Refills: 1 (one)           Orders:       APPTO  Appointment need  (In-House)              Other long term (current) drug therapy    Controlled substance documentation: Julius reviewed; prior drug screen consistent; consent is reviewed and signed and on the chart.  She is aware of risk of addiction on this medication, understands that she will need to follow up for a review every 3 months and her medications will be adjusted or decreased as deemed appropriate at each visit.  No history of drug or alcohol abuse.  No concerns about diversion or abuse. She denies side effects related to the medication.  She is aware that she may be called in for pill counts.  The dosing of this medication will be reviewed on a regular basis and reduced if possible..  Ongoing use  of a controlled substance is necessary for this patient to have a normal quality of life         Anxiety disorder, unspecifiedStable.  Refills sent.          Prescriptions:       [Refilled] escitalopram oxalate 10 mg oral tablet [take ONE tablet by MOUTH ONCE daily], #90 (ninety) tablets, Refills: 1 (one)         Hypothyroidism, unspecifiedNot on meds.  Monitoring.            Patient Recommendations:        For  Low back pain:    Schedule a follow-up visit in 6 months.                APPOINTMENT INFORMATION:        Monday Tuesday Wednesday Thursday Friday Saturday Sunday            Time:___________________AM  PM   Date:_____________________             Charge Capture:         Primary Diagnosis:     M54.5  Low back pain           Orders:      03353  Office/outpatient visit; established patient, level 4  (In-House)            APPTO  Appointment need  (In-House)              Z79.899  Other long term (current) drug therapy     F41.9  Anxiety disorder, unspecified     E03.9  Hypothyroidism, unspecified

## 2021-05-18 NOTE — PROGRESS NOTES
Gabriela RiggsLili 1975     Office/Outpatient Visit    Visit Date: Tue, Aug 20, 2019 01:23 pm    Provider: Merle Brunson N.P. (Assistant: Addis Wilson MA)    Location: [facility]        Electronically signed by Merle Brunson N.P. on  09/02/2019 01:06:56 PM                             SUBJECTIVE:        CC:     Mrs. Riggs is a 43 year old White female.  Patient presents today for follow up on colonoscopy 07/2019 with Dr. Simon, also wants to discuss medications (not taking Omeprazole);         HPI:         Patient complains of abdominal pain, unspecified.  Pt states in April she was driving and had an episode of abd cramping and then her arms started to cramp. Her  met her and they took her to ER. She had a kidney infection. She then came later to our office. It was suggseted to have a CLN. She did and it was ok. She also had an EGD showing gastritis.  Pt states it was determined that her episodes were due to anxiety. She was having anxiety attacks. She is having a hard time figuring out the cause. She feels it is triggered out of nowhere sometimes.         In regard to the allergies, pt states seasonal allergies. Sometimes triggers her to feel sob. States using an inhaler in the past when needed and worked well. Requesting a refill.      ROS:     CONSTITUTIONAL:  Negative for chills, fatigue, fever, and weight change.      EYES:  Negative for blurred vision.      CARDIOVASCULAR:  Negative for chest pain, orthopnea, paroxysmal nocturnal dyspnea and pedal edema.      RESPIRATORY:  Negative for dyspnea.      GASTROINTESTINAL:  Positive for abdominal pain.   Negative for constipation, diarrhea, nausea or vomiting.      NEUROLOGICAL:  Negative for dizziness, headaches, paresthesias, and weakness.      PSYCHIATRIC:  Negative for anxiety, depression, and sleep disturbances.          PMH/FMH/SH:     Last Reviewed on 9/02/2019 12:38 PM by Merle rBunson    Past Medical History:      "            PAST MEDICAL HISTORY         Hypothyroidism         PREVENTIVE HEALTH MAINTENANCE             COLORECTAL CANCER SCREENING: Up to date (colonoscopy q10y; sigmoidoscopy q5y; Cologuard q3y) was last done 7/18/2019, Results are in chart; colonoscopy with normal results     MAMMOGRAM: Done within last 2 years and results in are chart was last done 6/20/2018 with normal results     PAP SMEAR: was last done s/p total hysterectomy         Surgical History:         Cholecystectomy: 2014;     Hysterectomy: 2000; uterus only;     Tubal Ligation: at age in her 20s;      L breast lumpectomy - benign;         Family History:     Father: Type 1 Diabetes     Mother: Healthy     Maternal Grandmother: \"thyroid issues and nodules\"         Social History:     Occupation:  at [employer];     Marital Status:      Children: 2 children         Tobacco/Alcohol/Supplements:     Last Reviewed on 9/02/2019 12:38 PM by Merle Brunson    Tobacco: Current Smoker: She currently smokes every day, 1 pack per day; (start age 18 pack-year history).          Substance Abuse History:     Last Reviewed on 9/02/2019 12:38 PM by Merle Brunson        Mental Health History:     Last Reviewed on 9/02/2019 12:38 PM by Merle Brunson        Communicable Diseases (eg STDs):     Last Reviewed on 9/02/2019 12:38 PM by Merle Brunson            Current Problems:     Last Reviewed on 9/02/2019 12:38 PM by Merle Brunson    Tachycardia, NOS     Abdominal pain, unspecified     Fatigue     Use of high risk medications     Chronic low back pain     Screening for lipoid disorders     Hypothyroidism     Allergies     Anxiety, NOS         Immunizations:     Fluzone (3 + years dose) 10/1/2016     Fluzone (3 + years dose) 11/1/2017     Fluzone Quadrivalent (3+ years) 12/17/2018         Allergies:     Last Reviewed on 9/02/2019 12:38 PM by Merle Brunson      No Known Drug Allergies.         Current Medications:     Last " Reviewed on 9/02/2019 12:38 PM by Merle Brunson    Gabapentin 100mg Capsules Give 2 capsules by mouth 3 times daily     Synthroid 0.025mg Tablet Take 1 tablet(s) by mouth daily     Flonase Allergy Relief 50mcg/1actuation Nasal Spray 1 spray each nostril bid prn     Fexofenadine HCl 180mg Tablet 1 tab daily     Bystolic 2.5mg Tablet Take 1 tablet(s) by mouth daily     Fluoxetine 20mg Tablet 1 tab daily     Metoprolol Succinate 25mg Tablets, Extended Release take 1/2 tab at bedtime         OBJECTIVE:        Vitals:         Current: 8/20/2019 1:28:14 PM    Ht:  5 ft, 5 in;  Wt: 153.4 lbs;  BMI: 25.5    T: 98.2 F (oral);  BP: 128/73 mm Hg (right arm, sitting);  P: 72 bpm (right arm (BP Cuff), sitting);  sCr: 0.7 mg/dL;  GFR: 102.44        Exams:     PHYSICAL EXAM:     GENERAL: vital signs recorded - well developed, well nourished;  no apparent distress;     EYES: extraocular movements intact; conjunctiva and cornea are normal; PERRLA;     E/N/T:  normal EACs, TMs, nasal/oral mucosa, teeth, gingiva, and oropharynx;     NECK: range of motion is normal; thyroid is non-palpable;     RESPIRATORY: normal respiratory rate and pattern with no distress; normal breath sounds with no rales, rhonchi, wheezes or rubs;     CARDIOVASCULAR: normal rate; rhythm is regular;  no systolic murmur; no edema;     GASTROINTESTINAL: nontender; normal bowel sounds; no organomegaly;     NEUROLOGICAL:  cranial nerves, motor and sensory function, reflexes, gait and coordination are all intact;     PSYCHIATRIC:  appropriate affect and demeanor; normal speech pattern; grossly normal memory;         ASSESSMENT:           789.00   R10.84  Abdominal pain, unspecified              DDx:     300.00   F41.9  Anxiety, NOS              DDx:     477.0   J30.1  Allergies              DDx:         ORDERS:         Meds Prescribed:       Prozac (Fluoxetine) 20mg Capsules 1 tablet at 2 hours before bed  #30 (Thirty) capsule(s) Refills: 1       Ventolin HFA  (Albuterol) 90mcg/1actuation Oral Inhaler Inhale 2 puff(s) by mouth 4 times a day as needed  #1 (One) inhaler(s) Refills: 0         Procedures Ordered:       REFER  Referral to Specialist or Other Facility  (Send-Out)                   PLAN:          Anxiety, NOS         REFERRALS:  Referral initiated to a psychologist ( Dr. Mai Finley ).            Prescriptions:       Prozac (Fluoxetine) 20mg Capsules 1 tablet at 2 hours before bed  #30 (Thirty) capsule(s) Refills: 1           Orders:       REFER  Referral to Specialist or Other Facility  (Send-Out)            Allergies Pt will consider allergy referral.           Prescriptions:       Ventolin HFA (Albuterol) 90mcg/1actuation Oral Inhaler Inhale 2 puff(s) by mouth 4 times a day as needed  #1 (One) inhaler(s) Refills: 0             CHARGE CAPTURE:           Primary Diagnosis:     789.00 Abdominal pain, unspecified            R10.84    Generalized abdominal pain              Orders:          20015   Office/outpatient visit; established patient, level 4  (In-House)           300.00 Anxiety, NOS            F41.9    Anxiety disorder, unspecified    477.0 Allergies            J30.1    Allergic rhinitis due to pollen            76644 Detailed

## 2021-05-18 NOTE — PROGRESS NOTES
Gabriela Riggs  1975     Office/Outpatient Visit    Visit Date: Tue, Apr 13, 2021 09:55 am    Provider: Deepak Reynolds N.P. (Assistant: Ambar Davis,  )    Location: [facility]        Electronically signed by Deepak Reynolds N.P. on  04/13/2021 01:20:42 PM                             Subjective:        CC: Mrs. Riggs is a 45 year old White female.  RUQ pain around to shoulder blade;         HPI:           Patient to be evaluated for right upper quadrant abdominal tenderness.  This is located primarily in the right upper quadrant.  There is some radiation to the right side of the back.  It began 2 months ago.  The onset of pain occurred while eating.  She characterizes it as aching, cramping, and Fullness feeling.  It is of moderate intensity.  She estimates that the frequency of pain is several times daily.  The typical duration is all day , gets better at times and worse at times.  Aggravating factors include meals.  Nothing relieves the pain.  She denies melena, change in bowel habits, constipation, diarrhea, dysuria, fever, nausea, sweats, vomiting and weight loss.  No family members or other contacts are similarly ill.  Mrs. Riggs denies recent travel.  Pertinent surgical history includes prior cholecystectomy and Hysterectomy.      ROS:     CONSTITUTIONAL:  Negative for fatigue and fever.      CARDIOVASCULAR:  Negative for chest pain, palpitations, tachycardia, orthopnea, and edema.      RESPIRATORY:  Negative for recent cough, dyspnea and frequent wheezing.      GASTROINTESTINAL:  Positive for abdominal pain ( RUQ ) and diarrhea ( loose stools after having gallbladder removed, has hx of Cdiff in the past ).   Negative for constipation, nausea or vomiting.      NEUROLOGICAL:  Negative for dizziness, memory loss, paresthesias, tremor and weakness.      PSYCHIATRIC:  Negative for anxiety, depression, and sleep disturbances.          Past Medical History / Family History  "/ Social History:         Last Reviewed on 4/13/2021 10:05 AM by Deepak Reynolds    Past Medical History:                 PAST MEDICAL HISTORY         Hypothyroidism         PREVENTIVE HEALTH MAINTENANCE             COLORECTAL CANCER SCREENING: Up to date (colonoscopy q10y; sigmoidoscopy q5y; Cologuard q3y) was last done 7/18/2019, Results are in chart; colonoscopy with normal results     MAMMOGRAM: Done within last 2 years and results in are chart was last done 1/13/2020 with normal results     PAP SMEAR: was last done s/p total hysterectomy         Surgical History:         Cholecystectomy: 2014;     Hysterectomy: 2000; uterus only;     Tubal Ligation: at age in her 20s;     L breast lumpectomy - benign;         Family History:     Father: Type 1 Diabetes     Mother: Healthy     Maternal Grandmother: \"thyroid issues and nodules\"         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          Mental Health History:     Last Reviewed on 4/13/2021 10:05 AM by Deepak Reynolds        Current Problems:     Last Reviewed on 4/13/2021 10:05 AM by Deepak Reynolds    Hypothyroidism, unspecified    Other long term (current) drug therapy    Low back pain    Allergic rhinitis due to pollen    Anxiety disorder, unspecified    Depression - Major depressive disorder, recurrent, moderate    Cervicalgia    Nicotine dependence, unspecified, uncomplicated    Encounter for other screening for malignant neoplasm of breast    Headache, unspecified        Immunizations:     Influenza, injectable, MDCK, preservative free, quadrivalent 10/2/2020    Tdap (ADACEL TDAP) 8/7/2020    Fluzone (3 + years dose) 10/1/2016    Fluzone (3 + years " dose) 11/1/2017    Fluzone Quadrivalent (3+ years) 12/17/2018        Allergies:     Last Reviewed on 4/13/2021 10:05 AM by Deepak Reynolds    Fluoxetine: tachycardia         Current Medications:     Last Reviewed on 4/13/2021 10:05 AM by Deepak Reynolds    cetirizine 10 mg oral tablet [take 1 tablet (10 mg) by oral route once daily]    Flonase Allergy Relief 50 mcg/actuation Intranasal Spray, Suspension [1 spray each nostril bid prn]    escitalopram oxalate 10 mg oral tablet [take ONE tablet by MOUTH ONCE daily]    gabapentin 100 mg oral capsule [take 1 capsule (100 mg) by oral route 2 times per day]    buPROPion  mg oral tablet, sustained-release 12 hr [take 1 tablet (150 mg) by oral route 2 times per day]        Objective:        Vitals:         Current: 4/13/2021 10:00:41 AM    Ht:  5 ft, 5 in;  Wt: 167 lbs;  BMI: 27.8T: 97 F (temporal);  BP: 128/71 mm Hg (left arm, sitting);  P: 71 bpm (left arm (BP Cuff), sitting);  sCr: 0.75 mg/dL;  GFR: 97.10        Exams:     PHYSICAL EXAM:     GENERAL: vital signs recorded - well developed, well nourished;  well groomed;  no apparent distress;     RESPIRATORY: normal respiratory rate and pattern with no distress; normal breath sounds with no rales, rhonchi, wheezes or rubs;     CARDIOVASCULAR: normal rate; rhythm is regular;  no systolic murmur; no edema;     GASTROINTESTINAL: mild RUQ tenderness;  normal bowel sounds; no organomegaly; no masses;     NEUROLOGIC: GROSSLY INTACT     PSYCHIATRIC:  appropriate affect and demeanor; normal speech pattern; grossly normal memory;         Assessment:         Z12.39   Encounter for other screening for malignant neoplasm of breast       R10.811   Right upper quadrant abdominal tenderness           ORDERS:         Radiology/Test Orders:       [id number]  Radiologic exam chest 2 views  (Send-Out; Stat)              Lab Orders:       [id number]  BDCB2 - [facility] CBC w/o diff  (Send-Out)            49549  COMP - [facility] Comp. Metabolic Panel   (Send-Out)            61054  BDUAM - [facility] Urinalysis, automated, with micro  (Send-Out)            09819  [id number] H. pylori Breath test  (Send-Out)                      Plan:         Encounter for other screening for malignant neoplasm of breastEncouraged to get her Mammogram scheduled.         Right upper quadrant abdominal tendernessIf labs and xray wnl, may need to do US or CT abdomen     LABORATORY:  Labs ordered to be performed today include CBC W/O DIFF, Comprehensive metabolic panel, and urinalysis with micro.      RADIOLOGY:  I have ordered a chest x-ray (PA and lateral) to be done today.            Orders:       [id number]  BDCB2 - [facility] CBC w/o diff  (Send-Out)            45203  COMP - [facility] Comp. Metabolic Panel  (Send-Out)            66421  BDUA - [facility] Urinalysis, automated, with micro  (Send-Out)            36741  Radiologic exam chest 2 views  (Send-Out; Stat)            69715  [id number] H. pylori Breath test  (Send-Out)                  Charge Capture:         Primary Diagnosis:     Z12.39  Encounter for other screening for malignant neoplasm of breast           Orders:      00345  Office/outpatient visit; established patient, level 3  (In-House)              R10.811  Right upper quadrant abdominal tenderness

## 2021-05-18 NOTE — PROGRESS NOTES
Gabriela RiggsLili 1975     Office/Outpatient Visit    Visit Date: Tue, Jun 4, 2019 05:23 pm    Provider: Kaylie Jarrell N.P. (Assistant: Melissa Newberry MA)    Location: [facility]        Electronically signed by Kaylie Jarrell N.P. on  06/06/2019 09:40:40 AM                             SUBJECTIVE:        CC:     Mrs. Riggs is a 43 year old White female.  She presents with follow up on abdominal pain & US that was recently done which showed ovarian cyst.          HPI:         Abdominal pain, unspecified noted.  since last visit abd pain has moved from epigastric to pelvic with pressure like feeling at bladder and rectum to more left mid to lower quadrant pain this week.  pain occurs daily.  nothing relieves or worsens pain.  afebrile.  left ovarian cyst noted on US last month.  hx partial hysterectomy and cholecystectomy.      ROS:     CONSTITUTIONAL:  Negative for chills, fatigue, fever, and weight change.      CARDIOVASCULAR:  Negative for chest pain, palpitations, tachycardia, orthopnea, and edema.      RESPIRATORY:  Negative for cough, dyspnea, and hemoptysis.      GASTROINTESTINAL:  Positive for abdominal pain ( LLQ; suprapubic ), abdominal bloating, hemorrhoids and nausea.   Negative for acid reflux symptoms, dysphagia, constipation, diarrhea, heartburn, hematochezia, melena or vomiting.      GENITOURINARY:  Negative for genital lesions, hematuria, menstrual problems, polyuria, abnormal vaginal bleeding, and vaginal discharge.      MUSCULOSKELETAL:  Negative for arthralgias and myalgias.      NEUROLOGICAL:  Negative for dizziness, headaches, paresthesias, and weakness.          PMH/FMH/SH:     Last Reviewed on 12/17/2018 01:05 PM by Candis Carey    Past Medical History:                 PAST MEDICAL HISTORY         Hypothyroidism         PREVENTIVE HEALTH MAINTENANCE             MAMMOGRAM: Done within last 2 years and results in are chart was last done 6/20/2018 with normal results  "    PAP SMEAR: was last done s/p total hysterectomy         Surgical History:         Cholecystectomy: 2014;     Hysterectomy: 2000; uterus only;     Tubal Ligation: at age in her 20s;      L breast lumpectomy - benign;         Family History:     Father: Type 1 Diabetes     Mother: Healthy     Maternal Grandmother: \"thyroid issues and nodules\"         Social History:     Occupation:  at [employer];     Marital Status:      Children: 2 children         Tobacco/Alcohol/Supplements:     Last Reviewed on 4/23/2019 02:05 PM by Fina Miller    Tobacco: Current Smoker: She currently smokes every day, 1 pack per day; (start age 18 pack-year history).          Substance Abuse History:     Last Reviewed on 12/17/2018 01:05 PM by Candis Carey        Mental Health History:     Last Reviewed on 12/17/2018 01:05 PM by Candis Carey        Communicable Diseases (eg STDs):     Last Reviewed on 12/17/2018 01:05 PM by Candis Carey            Current Problems:     Last Reviewed on 12/17/2018 01:05 PM by Candis Carey    Fatigue     Use of high risk medications     Chronic low back pain     Screening for lipoid disorders     Hypothyroidism     Mucous in stool     Paresthesia     Abdominal pain, unspecified         Immunizations:     Fluzone (3 + years dose) 10/1/2016     Fluzone (3 + years dose) 11/1/2017     Fluzone Quadrivalent (3+ years) 12/17/2018         Allergies:     Last Reviewed on 4/23/2019 02:05 PM by Fina Miller      No Known Drug Allergies.         Current Medications:     Last Reviewed on 6/04/2019 05:27 PM by Melissa Newberry    Synthroid 0.025mg Tablet Take 1 tablet(s) by mouth daily     Gabapentin 100mg Capsules Give 2 capsules by mouth 3 times daily     Flonase Allergy Relief 50mcg/1actuation Nasal Spray 1 spray each nostril bid prn     Fexofenadine HCl 180mg Tablet 1 tab daily         OBJECTIVE:        Vitals:         Historical:     04/23/2019  BP:   117/62 mm Hg ( " (right arm, , sitting, );)     04/23/2019  Wt:   147lbs        Current: 6/4/2019 5:29:34 PM    Ht:  5 ft, 5 in;  Wt: 148.8 lbs;  BMI: 24.8    T: 98.1 F (oral);  BP: 125/73 mm Hg (right arm, sitting);  P: 90 bpm (right arm (BP Cuff), sitting);  sCr: 0.78 mg/dL;  GFR: 90.76        Exams:     PHYSICAL EXAM:     GENERAL:  well developed and nourished; appropriately groomed; in no apparent distress;     RESPIRATORY: normal respiratory rate and pattern with no distress; normal breath sounds with no rales, rhonchi, wheezes or rubs;     CARDIOVASCULAR: normal rate; rhythm is regular;     GASTROINTESTINAL: mild LLQ tenderness;  no guarding;  no rebound tenderness;  normal bowel sounds; no organomegaly; rectal exam: normal tone; no masses; no hemorrhoids; guaiac NEGATIVE stool;     GENITOURINARY: vagina: normal with good pelvic support and no lesions or discharge;     MUSCULOSKELETAL:  Normal range of motion, strength and tone;     NEUROLOGIC: mental status: alert and oriented x 3; GROSSLY INTACT     PSYCHIATRIC:  appropriate affect and demeanor; normal speech pattern; grossly normal memory;         Lab/Test Results:             Glucose, Urine:  Neg (06/04/2019),     Bilirubin, urine:  Negative (06/04/2019),     Ketones, Urine Strip:  Negative (06/04/2019),     Specific Gravity, urine:  1.005 (06/04/2019),     Blood in Urine:  negative (06/04/2019),     pH, urine:  5.5 (06/04/2019),     Protein Urine QL:  negative (06/04/2019),     Urobilinogen, urine:  0.2 E.U./dL (06/04/2019),     Nitrite, Urine:  Negative (06/04/2019),     Leukoctyes, urine:  Negative (06/04/2019),     Appearance:  Clear (06/04/2019),     collection source:  Clean-catch (06/04/2019),     Color:  Light Yellow (06/04/2019),     Performed by::  [facility] (06/04/2019),             ASSESSMENT           789.00   R10.84  Abdominal pain, unspecified              DDx:         ORDERS:         Lab Orders:       [id number]  Urinalysis, automated, without microscopy  (In-House)          9[facility]  [facility]  (Send-Out)         19553  COMP - [facility] Comp. Metabolic Panel  (Send-Out)                   PLAN:          Abdominal pain, unspecified    LABORATORY:  Labs ordered to be performed today include CBC and Comprehensive metabolic panel.      RECOMMENDATIONS given include: avoid spicy foods and to ER if worsens.  UA normal..            Orders:       [id number]  Urinalysis, automated, without microscopy  (In-House)         32195  [facility] CBC with 3 part diff  (Send-Out)         28187  COMP - [facility] Comp. Metabolic Panel  (Send-Out)               Patient Recommendations:        Abdominal pain, unspecified        Avoid spicy foods in your diet.              CHARGE CAPTURE           **Please note: ICD descriptions below are intended for billing purposes only and may not represent clinical diagnoses**        Primary Diagnosis:         789.00 Abdominal pain, unspecified            R10.84    Generalized abdominal pain              Orders:          93845   Office/outpatient visit; established patient, level 3  (In-House)             41517   Urinalysis, automated, without microscopy  (In-House)

## 2021-05-18 NOTE — PROGRESS NOTES
Gabriela Riggs. 1975     Office/Outpatient Visit    Visit Date: Thu, Nov 7, 2019 11:22 am    Provider: Candis Carey MD (Assistant: Addis Wilson MA)    Location: [facility]        Electronically signed by Candis Carey MD on  11/07/2019 12:48:47 PM                             SUBJECTIVE:        CC:     Mrs. Riggs is a 43 year old White female.  Patient presents today for two month follow up;         HPI: Gabriela is here today to follow up on chronic issues.        She is on Lexapro now for anxiety as well as buspirone.  Her main source of stress is that her  has been struggling with idiopathic pulmonary fibrosis.  She started the Lexapro on 10/21/19, started at 2.5 mg for 4-5 days.  Then she took 5 mg for about a week but still had some anxiety.  Bumped it up to 7.5 mg last weekend and then started 10 mg 4 days ago.  She is feeling better since about week 2 of starting the Lexapro.  Her mood is better.  Her anxiety is improved.      She is not having the 4-5 episodes of diarrhea that she was having previously.          She is on gabapentin for chronic low back pain.  This does work well to control her pain.  It also does seem to help with her anxiety and depression.  No adverse effects.  She went off it a couple of months ago and had a lot of trouble with both pain and anxiety.  She is due for a tox screen.        She is on Synthroid for hypothyroidism.  No heat/cold intolerance, no changes in hair or skin.  TSH done 6 weeks ago was normal.     ROS:     CONSTITUTIONAL:  Negative for fatigue and fever.      EYES:  Negative for blurred vision.      E/N/T:  Negative for diminished hearing and nasal congestion.      CARDIOVASCULAR:  Negative for chest pain and palpitations.      RESPIRATORY:  Negative for recent cough and dyspnea.      GASTROINTESTINAL:  Negative for abdominal pain, constipation, diarrhea, nausea and vomiting.      MUSCULOSKELETAL:  Negative for arthralgias and  "myalgias.      PSYCHIATRIC:  Negative for anxiety, depression and sleep disturbance.          PMH/FMH/SH:     Last Reviewed on 11/07/2019 11:30 AM by aCndis Carey    Past Medical History:                 PAST MEDICAL HISTORY         Hypothyroidism         PREVENTIVE HEALTH MAINTENANCE             COLORECTAL CANCER SCREENING: Up to date (colonoscopy q10y; sigmoidoscopy q5y; Cologuard q3y) was last done 7/18/2019, Results are in chart; colonoscopy with normal results     MAMMOGRAM: Done within last 2 years and results in are chart was last done 6/20/2018 with normal results     PAP SMEAR: was last done s/p total hysterectomy         Surgical History:         Cholecystectomy: 2014;     Hysterectomy: 2000; uterus only;     Tubal Ligation: at age in her 20s;      L breast lumpectomy - benign;         Family History:     Father: Type 1 Diabetes     Mother: Healthy     Maternal Grandmother: \"thyroid issues and nodules\"         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          Substance Abuse History:     Last Reviewed on 11/07/2019 11:30 AM by Candis Carey        Mental Health History:     Last Reviewed on 11/07/2019 11:30 AM by Candis Carey        Communicable Diseases (eg STDs):     Last Reviewed on 11/07/2019 11:30 AM by Candis Carey            Current Problems:     Last Reviewed on 9/02/2019 12:38 PM by Merle Brunson    Major depression, recurrent episode, moderate     Tachycardia, NOS     Abdominal pain, unspecified     Fatigue     Use of high risk medications     Chronic low back pain     Screening for lipoid disorders     Hypothyroidism     Anxiety, NOS     Allergies         Immunizations:     Fluzone (3 " + years dose) 10/1/2016     Fluzone (3 + years dose) 11/1/2017     Fluzone Quadrivalent (3+ years) 12/17/2018         Allergies:     Last Reviewed on 9/24/2019 02:50 PM by Addis Wilson    Fluoxetine: tachycardia        Current Medications:     Last Reviewed on 9/24/2019 02:52 PM by Addis Wilson    Gabapentin 300mg Capsules 1 cap TID     Ventolin HFA 90mcg/1actuation Oral Inhaler Inhale 2 puff(s) by mouth 4 times a day as needed     Synthroid 0.025mg Tablet Take 1 tablet(s) by mouth daily     Buspirone HCl 15mg Tablet 1 PO BID PRN     Flonase Allergy Relief 50mcg/1actuation Nasal Spray 1 spray each nostril bid prn     Fexofenadine HCl 180mg Tablet 1 tab daily         OBJECTIVE:        Vitals:         Current: 11/7/2019 11:26:12 AM    Ht:  5 ft, 5 in;  Wt: 158.2 lbs;  BMI: 26.3    T: 97.6 F (oral);  BP: 115/71 mm Hg (left arm, sitting);  P: 96 bpm (left arm (BP Cuff), sitting);  sCr: 0.7 mg/dL;  GFR: 103.79        Exams:     PHYSICAL EXAM:     GENERAL: vital signs recorded - well developed, well nourished;  no apparent distress;     EYES: extraocular movements intact; conjunctiva and cornea are normal; PERRLA;     RESPIRATORY: normal respiratory rate and pattern with no distress;     MUSCULOSKELETAL: normal gait; normal overall tone     PSYCHIATRIC: affect/demeanor: anxious;  normal psychomotor function; normal speech pattern;         Lab/Test Results:             Urine temperature:  confirmed (11/07/2019),     All urine drug screen levels confirmed negative:  yes (11/07/2019),     Date and time of last pill:  Gabapentin 11/07/2019 @ 0700/AS (11/07/2019),     Performed by:  [facility] (11/07/2019),     Collection Time:  11:55 (11/07/2019),             ASSESSMENT           300.00   F41.9  Anxiety, NOS              DDx:     724.2   M54.5  Chronic low back pain              DDx:     V58.69   Z79.899  Use of high risk medications              DDx:     244.9   E03.9  Hypothyroidism              DDx:         ORDERS:          Radiology/Test Orders:       3014F  Screening mammography results documented and reviewed (PV)1  (In-House)         3017F  Colorectal CA screen results documented and reviewed (PV)  (In-House)           Lab Orders:       APPTO  Appointment need  (In-House)         95283  Drug test prsmv qual dir optical obs per day  (In-House)                   PLAN:          Anxiety, NOS She is doing great on the Lexapro and has only been on it for 3 weeks so far.  Hopefully will continue to do well.  No significant adverse effects.  No refills needed.  RTC 3 months.     MIPS Smoking cessation encouraged. Counseling for less than 3 minutes.      FOLLOW-UP: Schedule a follow-up visit in 3 months.:.  f/u anxiety with Aurelio OK TO CANCEL APPT FOR DECEMBER           Orders:       3014F  Screening mammography results documented and reviewed (PV)1  (In-House)         3017F  Colorectal CA screen results documented and reviewed (PV)  (In-House)         APPTO  Appointment need  (In-House)            Chronic low back pain She is stable on her current regimen.  Pain is well controlled.  No adverse effects.  She does require ongoing use of this controlled substance to function.  Tox screen and Julius run today.  No refills needed today.  RTC 3 months.             Use of high risk medications     Controlled substance documentation: Julius reviewed; drug screen performed and appropriate; consent is reviewed and signed and on the chart.  She is aware of risk of addiction on this medication, understands that she will need to follow up for a review every 3 months and her medications will be adjusted or decreased as deemed appropriate at each visit.  No history of drug or alcohol abuse.  No concerns about diversion or abuse. She denies side effects related to the medication.  She is aware that she may be called in for pill counts.  The dosing of this medication will be reviewed on a regular basis and reduced if possible..  Ongoing use of a  controlled substance is necessary for this patient to have a normal quality of life           Orders:       05102  Drug test prsmv qual dir optical obs per day  (In-House)            Hypothyroidism Stable.  TSH WNL.  No refills needed.             Patient Recommendations:        For  Anxiety, NOS:     Schedule a follow-up visit in 3 months.                APPOINTMENT INFORMATION:        Monday Tuesday Wednesday Thursday Friday Saturday Sunday            Time:___________________AM  PM   Date:_____________________             CHARGE CAPTURE           **Please note: ICD descriptions below are intended for billing purposes only and may not represent clinical diagnoses**        Primary Diagnosis:         300.00 Anxiety, NOS            F41.9    Anxiety disorder, unspecified              Orders:          51798   Office/outpatient visit; established patient, level 4  (In-House)             3014F   Screening mammography results documented and reviewed (PV)1  (In-House)             3017F   Colorectal CA screen results documented and reviewed (PV)  (In-House)             APPTO   Appointment need  (In-House)           724.2 Chronic low back pain            M54.5    Low back pain    V58.69 Use of high risk medications            Z79.899    Other long term (current) drug therapy              Orders:          46270   Drug test prsmv qual dir optical obs per day  (In-House)           244.9 Hypothyroidism            E03.9    Hypothyroidism, unspecified

## 2021-05-18 NOTE — PROGRESS NOTES
Gabriela Riggs EMMETT  1975     Office/Outpatient Visit    Visit Date: Thu, Nov 14, 2019 03:12 pm    Provider: Candis Carey MD (Assistant: Addis Wilson MA)    Location: [facility]        Electronically signed by Candis Carey MD on  11/14/2019 04:16:27 PM                             Subjective:        CC: Mrs. Riggs is a 43 year old White female.  Patient presents today for two month follow up;         HPI: Gabriela is back today to discuss something she forgot last week.  Back in December, she saw Dr. Perez.  He was worried about her thyroid levels.  He had her stop her thyroid meds (0.025 mg levothyroxine) about 4 weeks ago.  He then recommended that she come back here to have her levels checked and get further recommendations.    ROS:     CONSTITUTIONAL:  Negative for fatigue and fever.      CARDIOVASCULAR:  Negative for chest pain and palpitations.      RESPIRATORY:  Negative for recent cough and dyspnea.      MUSCULOSKELETAL:  Negative for arthralgias and myalgias.      PSYCHIATRIC:  Negative for anxiety, depression and sleep disturbance.          Past Medical History / Family History / Social History:         Last Reviewed on 11/14/2019 03:23 PM by Candis Carey    Past Medical History:                 PAST MEDICAL HISTORY         Hypothyroidism         PREVENTIVE HEALTH MAINTENANCE             COLORECTAL CANCER SCREENING: Up to date (colonoscopy q10y; sigmoidoscopy q5y; Cologuard q3y) was last done 7/18/2019, Results are in chart; colonoscopy with normal results     MAMMOGRAM: Done within last 2 years and results in are chart was last done 6/20/2018 with normal results     PAP SMEAR: was last done s/p total hysterectomy         Surgical History:         Cholecystectomy: 2014;     Hysterectomy: 2000; uterus only;     Tubal Ligation: at age in her 20s;     L breast lumpectomy - benign;         Family History:     Father: Type 1 Diabetes     Mother: Healthy     Maternal  "Grandmother: \"thyroid issues and nodules\"         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          Substance Abuse History:     Last Reviewed on 11/14/2019 03:23 PM by Candis Carey        Mental Health History:     Last Reviewed on 11/14/2019 03:23 PM by Candis Carey        Communicable Diseases (eg STDs):     Last Reviewed on 11/14/2019 03:23 PM by Candis Carey        Current Problems:     Last Reviewed on 11/07/2019 11:30 AM by Candis Carey    Hypothyroidism, unspecified    Other fatigue    Other long term (current) drug therapy    Low back pain    Generalized abdominal pain    Allergic rhinitis due to pollen    Tachycardia, unspecified    Anxiety disorder, unspecified    Major depressive disorder, recurrent, moderate        Immunizations:     Fluzone (3 + years dose) 10/1/2016    Fluzone (3 + years dose) 11/1/2017    Fluzone Quadrivalent (3+ years) 12/17/2018        Allergies:     Last Reviewed on 11/07/2019 11:30 AM by Candis Carey    Fluoxetine: tachycardia         Current Medications:     Last Reviewed on 11/07/2019 11:30 AM by Candis Carey    Synthroid 0.025mg Tablet [Take 1 tablet(s) by mouth daily]    Gabapentin 300mg Capsules [1 cap TID]    Fexofenadine HCl 180mg Tablet [1 tab daily]    Flonase Allergy Relief 50mcg/1actuation Nasal Spray [1 spray each nostril bid prn]    Ventolin HFA 90mcg/1actuation Oral Inhaler [Inhale 2 puff(s) by mouth 4 times a day as needed]    Buspirone HCl 15mg Tablet [1 PO BID PRN]    Lexapro 10mg Tablet [1 tab daily]        Objective:        Vitals:         Current: 11/14/2019 3:16:44 PM    Ht:  5 ft, 5 in;  Wt: 158.8 lbs;  BMI: 26.4T: 97.3 F (oral);  BP: 121/75 mm " Hg (left arm, sitting);  P: 76 bpm (left arm (BP Cuff), sitting);  sCr: 0.7 mg/dL;  GFR: 103.96        Exams:     PHYSICAL EXAM:     GENERAL: vital signs recorded - well developed, well nourished;  no apparent distress;     EYES: extraocular movements intact; conjunctiva and cornea are normal; PERRLA;     RESPIRATORY: normal respiratory rate and pattern with no distress;     MUSCULOSKELETAL: normal gait; normal overall tone     PSYCHIATRIC: affect/demeanor: anxious;  normal psychomotor function; normal speech pattern;         Assessment:         E03.9   Hypothyroidism, unspecified           ORDERS:         Lab Orders:       37269  THYII - [facility] Thyroid panel with TSH (62603, 13515)  (Send-Out)            30486  T3RIA - [facility] Triiodothyronine (T3)  (Send-Out)                      Plan:         Hypothyroidism, unspecifiedShe has been off her levothyroxine for 4 weeks now per Dr. Perez's directions.  Will wait one more week before checking thyroid labs (as below) and then add her Synthroid back in as indicated.  Will contact her with further instructions.    LABORATORY:  Labs ordered to be performed today include Thyroid Panel.            Orders:       94352  THYII - [facility] Thyroid panel with TSH (99161, 97537)  (Send-Out)            71077  T3RIA - [facility] Triiodothyronine (T3)  (Send-Out)                  Charge Capture:         Primary Diagnosis:     E03.9  Hypothyroidism, unspecified           Orders:      37964  Office/outpatient visit; established patient, level 3  (In-House)

## 2021-05-18 NOTE — PROGRESS NOTES
Gabriela Riggs. 1975     Office/Outpatient Visit    Visit Date: Mon, Dec 17, 2018 12:57 pm    Provider: Candis Carey MD (Assistant: Addis Wilson MA)    Location: [facility]        Electronically signed by Candis Carey MD on  12/17/2018 02:25:23 PM                             SUBJECTIVE:        CC:     Ms. Riggs is a 42 year old White female.  This is a follow-up visit.  Patient presents today for six month follow up, also has complaints of pain in right arm X  1 month;         HPI: Gabriela is here for routine follow-up of chronic issues.        She is on gabapentin for chronic low back pain.   Sharp, lumbar pain.  Non-radiating.  This does a very good job of controlling her pain at just 200 mg TID.  No adverse effects including sedation.          She does have c/o R arm pain for the past 1 month.  It started a couple days after she was moving furniture.  It's wrost with elbow extension and gripping.  Some radiation up into the upper arm.          She is on Synthroid for hypothyroidism.  No heat/cold intolerance, no changes in hair or skin.     ROS:     CONSTITUTIONAL:  Positive for fatigue.   Negative for fever.      EYES:  Negative for blurred vision.      E/N/T:  Negative for diminished hearing and nasal congestion.      CARDIOVASCULAR:  Positive for pedal edema ( baseline lymphedema in the LLE (leg was slammed accidentally in the car door when she was a child) ).   Negative for chest pain or palpitations.      RESPIRATORY:  Negative for recent cough and dyspnea.      GASTROINTESTINAL:  Negative for abdominal pain, constipation, diarrhea, nausea and vomiting.      MUSCULOSKELETAL:  Positive for back pain and limb pain ( right upper extremity pain ).   Negative for arthralgias or myalgias.      NEUROLOGICAL:  Negative for paresthesias and weakness.      PSYCHIATRIC:  Positive for insomnia.          PMH/FMH/SH:     Last Reviewed on 12/17/2018 01:05 PM by Candis Carey    Past  "Medical History:                 PAST MEDICAL HISTORY         Hypothyroidism         PREVENTIVE HEALTH MAINTENANCE             MAMMOGRAM: Done within last 2 years and results in are chart was last done 6/20/2018 with normal results     PAP SMEAR: was last done s/p total hysterectomy         Surgical History:         Cholecystectomy: 2014;     Hysterectomy: 2000; uterus only;     Tubal Ligation: at age in her 20s;      L breast lumpectomy - benign;         Family History:     Father: Type 1 Diabetes     Mother: Healthy     Maternal Grandmother: \"thyroid issues and nodules\"         Social History:     Occupation:  at [employer];     Marital Status:      Children: 2 children         Tobacco/Alcohol/Supplements:     Last Reviewed on 12/17/2018 01:05 PM by Candis Carey    Tobacco: Current Smoker: She currently smokes every day, 1 pack per day; (start age 18 pack-year history).          Substance Abuse History:     Last Reviewed on 12/17/2018 01:05 PM by Candis Carey        Mental Health History:     Last Reviewed on 12/17/2018 01:05 PM by Candis Carey        Communicable Diseases (eg STDs):     Last Reviewed on 12/17/2018 01:05 PM by Candis Carey            Current Problems:     Last Reviewed on 6/27/2018 01:50 PM by Candis Carey    Use of high risk medications     Chronic low back pain     Fatigue     Screening for lipoid disorders     Hypothyroidism         Immunizations:     Fluzone (3 + years dose) 10/1/2016     Fluzone (3 + years dose) 11/1/2017         Allergies:     Last Reviewed on 6/27/2018 01:50 PM by Candis Carey      No Known Drug Allergies.         Current Medications:     Last Reviewed on 6/27/2018 01:50 PM by Candis Carey    Synthroid 0.025mg Tablet Take 1 tablet(s) by mouth daily     Gabapentin 100mg Capsules Give 2 capsules by mouth 3 times daily     Fexofenadine HCl 180mg Tablet 1 tab daily     Sudafed 12 Hour 120mg Tablets, Extended Release Take 1 tablet(s) " by mouth  daily         OBJECTIVE:        Vitals:         Current: 12/17/2018 1:02:02 PM    Ht:  5 ft, 5 in;  Wt: 150.6 lbs;  BMI: 25.1    T: 97.6 F (oral);  BP: 117/78 mm Hg (left arm, sitting);  P: 86 bpm (left arm (BP Cuff), sitting);  sCr: 0.78 mg/dL;  GFR: 92.16        Exams:     PHYSICAL EXAM:     GENERAL: vital signs recorded - well developed, well nourished;  well groomed;  no apparent distress;     EYES: extraocular movements intact; conjunctiva and cornea are normal; PERRLA;     E/N/T: OROPHARYNX:  normal mucosa, dentition, gingiva, and posterior pharynx;     RESPIRATORY: normal respiratory rate and pattern with no distress; normal breath sounds with no rales, rhonchi, wheezes or rubs;     CARDIOVASCULAR: normal rate; rhythm is regular;  no systolic murmur; no edema;     GASTROINTESTINAL: nontender; normal bowel sounds;     MUSCULOSKELETAL: normal gait; pain with range of motion in: right elbow extension;  normal overall tone R elbow - +TTP just under the lateral olecranon;         Lab/Test Results:             Urine temperature:  confirmed (12/17/2018),     All urine drug screen levels confirmed negative:  yes (12/17/2018),     Date and time of last pill:  Gabapentin 12/17/18 @ 900am/AS (12/17/2018),     Performed by:  pr (12/17/2018),     Collection Time:  13:59 (12/17/2018),             Procedures:     Vaccination against other viral diseases, Influenza     1. Influenza, seasonal PF (children 3 years to adult): 0.5 ml unit dose given IM in the left upper arm; administered by mak 12/17/18;  lot number [id number]; expires 6/30/19 Regarding contraindications to an Influenza vaccine:        No contraindications were noted.              ASSESSMENT           724.2   M54.5  Chronic low back pain              DDx:     V58.69   Z79.899  Use of high risk medications              DDx:     244.9   E03.9  Hypothyroidism              DDx:     726.32   M77.11  Tennis elbow              DDx:     780.79   R53.83  Fatigue               DDx:     V04.81   Z23  Vaccination against other viral diseases, Influenza              DDx:         ORDERS:         Meds Prescribed:       Refill of: Synthroid (Levothyroxine Sodium) 0.025mg Tablet Take 1 tablet(s) by mouth daily  #90 (Ninety) tablet(s) Refills: 3       Refill of: Gabapentin 100mg Capsules Give 2 capsules by mouth 3 times daily  #180 (One Clovis and Eighty) capsule(s) Refills: 5         Radiology/Test Orders:       3014F  Screening mammography results documented and reviewed (PV)1  (In-House)           Lab Orders:       70097  Drug test prsmv qual dir optical obs per day  (In-House)         APPTO  Appointment need  (In-House)         36612  TSH - [facility] TSH  (Send-Out)           Procedures Ordered:       REFER  Referral to Specialist or Other Facility  (Send-Out)         91471  Immunization administration; one vaccine  (In-House)           Other Orders:         Calculated BMI above the upper parameter and a follow-up plan was documented in the medical record  (In-House)         91689  Influenza virus vaccine, quadrivalent, split virus, preservative free 3 years of age & older  (In-House)                   PLAN:          Chronic low back pain Stable on the gabapentin.  Sx are well controlled.  No adverse effects.  She does require ongoing use of this controlled substance to function.  Tox screen and Julius run.  Refills sent.  RTC 6 months.     MIPS Vaccines Flu and Pneumonia updated in Shot record     MAMMOGRAM: Done within last 2 years and results in are chart     BMI Elevated - Follow-Up Plan: She was provided education on weight loss strategies     FOLLOW-UP: Schedule a follow-up visit in 6 months..  annual physical 30 min with Aurelio           Prescriptions:       Refill of: Gabapentin 100mg Capsules Give 2 capsules by mouth 3 times daily  #180 (One Clovis and Eighty) capsule(s) Refills: 5           Orders:       3014F  Screening mammography results documented and reviewed  (PV)1  (In-House)           Calculated BMI above the upper parameter and a follow-up plan was documented in the medical record  (In-House)         APPTO  Appointment need  (In-House)             Patient Education Handouts:       [facility] Medication Compliance           Use of high risk medications     Controlled substance documentation: Julius reviewed; drug screen performed and appropriate; consent is reviewed and signed and on the chart.  She is aware of risk of addiction on this medication, understands that she will need to follow up for a review every 3 months and her medications will be adjusted or decreased as deemed appropriate at each visit.  No history of drug or alcohol abuse.  No concerns about diversion or abuse. She denies side effects related to the medication.  She is aware that she may be called in for pill counts.  The dosing of this medication will be reviewed on a regular basis and reduced if possible..  Ongoing use of a controlled substance is necessary for this patient to have a normal quality of life           Orders:       01705  Drug test prsmv qual dir optical obs per day  (In-House)            Hypothyroidism Stable.  Refills sent.  Checking labs.  She is still having a lot of fatigue despite normal TSH.      LABORATORY:  Labs ordered to be performed today include TSH.            Prescriptions:       Refill of: Synthroid (Levothyroxine Sodium) 0.025mg Tablet Take 1 tablet(s) by mouth daily  #90 (Ninety) tablet(s) Refills: 3           Orders:       16213  TSH - [facility] TSH  (Send-Out)            Tennis elbow Conservative treatment with elbow strap, ice and NSAIDs prn.          Fatigue Excessive daytime sleepiness and snoring.  Concern for SRIDEVI.  Referral to Sleep Medicine.         REFERRALS:  Referral initiated to [facility].            Orders:       REFER  Referral to Specialist or Other Facility  (Send-Out)            Vaccination against other viral diseases, Influenza            Orders:       17328  Immunization administration; one vaccine  (In-House)         04248  Influenza virus vaccine, quadrivalent, split virus, preservative free 3 years of age & older  (In-House)               Patient Recommendations:        For  Chronic low back pain:     Schedule a follow-up visit in 6 months.                APPOINTMENT INFORMATION:        Monday Tuesday Wednesday Thursday Friday Saturday Sunday            Time:___________________AM  PM   Date:_____________________             CHARGE CAPTURE           **Please note: ICD descriptions below are intended for billing purposes only and may not represent clinical diagnoses**        Primary Diagnosis:         724.2 Chronic low back pain            M54.5    Low back pain              Orders:          14443   Office/outpatient visit; established patient, level 4  (In-House)             3014F   Screening mammography results documented and reviewed (PV)1  (In-House)                Calculated BMI above the upper parameter and a follow-up plan was documented in the medical record  (In-House)             APPTO   Appointment need  (In-House)           V58.69 Use of high risk medications            Z79.899    Other long term (current) drug therapy              Orders:          56214   Drug test prsmv qual dir optical obs per day  (In-House)           244.9 Hypothyroidism            E03.9    Hypothyroidism, unspecified    726.32 Tennis elbow            M77.11    Lateral epicondylitis, right elbow    780.79 Fatigue            R53.83    Other fatigue    V04.81 Vaccination against other viral diseases, Influenza            Z23    Encounter for immunization              Orders:          08989   Immunization administration; one vaccine  (In-House)             18682   Influenza virus vaccine, quadrivalent, split virus, preservative free 3 years of age & older  (In-House)               ADDENDUMS:      ____________________________________    Date: 12/17/2018  04:07 PM    Author: Maria Luisa Hanson         Visit Note Faxed to:        [facility] ; Number [phone]

## 2021-05-18 NOTE — PROGRESS NOTES
"Gabriela Riggs KESHAWN 1975     Office/Outpatient Visit    Visit Date: Mon, Jose 10, 2019 04:00 pm    Provider: Jose Ramon Gomez MD (Assistant: Gina Roth RN)    Location: [facility]        Electronically signed by Jose Ramon Gomez MD on  06/12/2019 06:19:18 AM                             SUBJECTIVE:        CC: abdominal pain         HPI:     Gabriela is in today for follow up on abdominal pain.  She says that she has been dealing with this for the last couple of months.  The previous history has been reviewed.  She was initially seen in the ER at [facility].  She had CT and labs there and was told that she may have UTI and was told ovarian cyst was present.  She has continued to have difficulty with pain on the L side of her abdomen.  She is supposed to see surgery in follow up at some point for possible scope due to mucus in stool.  She is also having some pain in the L hip and side.  She is also having hemorrhoids that are bothering her.     ROS:     CONSTITUTIONAL:  Negative for chills and fever.      CARDIOVASCULAR:  Negative for chest pain and palpitations.      RESPIRATORY:  Negative for recent cough and dyspnea.      GENITOURINARY:  Negative for dysuria and hematuria.          PMH/FMH/SH:     Last Reviewed on 6/10/2019 04:24 PM by Jose Ramon Gomez    Past Medical History:                 PAST MEDICAL HISTORY         Hypothyroidism         PREVENTIVE HEALTH MAINTENANCE             MAMMOGRAM: Done within last 2 years and results in are chart was last done 6/20/2018 with normal results     PAP SMEAR: was last done s/p total hysterectomy         Surgical History:         Cholecystectomy: 2014;     Hysterectomy: 2000; uterus only;     Tubal Ligation: at age in her 20s;      L breast lumpectomy - benign;         Family History:     Father: Type 1 Diabetes     Mother: Healthy     Maternal Grandmother: \"thyroid issues and nodules\"         Social History:     Occupation: human resources " manager at [employer];     Marital Status:      Children: 2 children         Tobacco/Alcohol/Supplements:     Last Reviewed on 6/10/2019 04:24 PM by Jose Ramon Gomez    Tobacco: Current Smoker: She currently smokes every day, 1 pack per day; (start age 18 pack-year history).          Substance Abuse History:     Last Reviewed on 6/10/2019 04:24 PM by Jose Ramon Gomez        Mental Health History:     Last Reviewed on 6/10/2019 04:24 PM by Jose Ramon Gomez        Communicable Diseases (eg STDs):     Last Reviewed on 6/10/2019 04:24 PM by Jose Ramon Gomez            Current Problems:     Last Reviewed on 6/10/2019 04:24 PM by Jose Ramon Gomez    Abdominal pain, unspecified     Fatigue     Use of high risk medications     Chronic low back pain     Screening for lipoid disorders     Hypothyroidism     Mucous in stool     Paresthesia         Immunizations:     Fluzone (3 + years dose) 10/1/2016     Fluzone (3 + years dose) 11/1/2017     Fluzone Quadrivalent (3+ years) 12/17/2018         Allergies:     Last Reviewed on 6/10/2019 04:24 PM by Jose Ramon Gomez      No Known Drug Allergies.         Current Medications:     Last Reviewed on 6/10/2019 04:24 PM by Jose Ramon Gomez    Synthroid 0.025mg Tablet Take 1 tablet(s) by mouth daily     Gabapentin 100mg Capsules Give 2 capsules by mouth 3 times daily     Flonase Allergy Relief 50mcg/1actuation Nasal Spray 1 spray each nostril bid prn     Fexofenadine HCl 180mg Tablet 1 tab daily         OBJECTIVE:        Vitals:         Current: 6/10/2019 4:05:19 PM    Ht:  5 ft, 5 in;  Wt: 149.4 lbs;  BMI: 24.9    T: 98.2 F (oral);  BP: 134/73 mm Hg (right arm, sitting);  P: 73 bpm (right arm (BP Cuff), sitting);  sCr: 0.7 mg/dL;  GFR: 101.30        Exams:     PHYSICAL EXAM:     GENERAL: vital signs recorded - well developed, well nourished;  anxious;     NECK: range of motion is normal; thyroid is non-palpable;     RESPIRATORY: normal  respiratory rate and pattern with no distress; normal breath sounds with no rales, rhonchi, wheezes or rubs;     CARDIOVASCULAR: normal rate; rhythm is regular;  no systolic murmur; no edema;     GASTROINTESTINAL: nontender; normal bowel sounds; no organomegaly;     LYMPHATIC: no enlargement of cervical or facial nodes;     BREAST/INTEGUMENT: SKIN: no significant rashes or lesions; no suspicious moles;     PSYCHIATRIC: affect/demeanor: anxious;         ASSESSMENT           789.00   R10.84  Abdominal pain, unspecified              DDx:         ORDERS:         Radiology/Test Orders:       3014F  Screening mammography results documented and reviewed (PV)1  (In-House)           Other Orders:       NOCHG  No Charge  (In-House)           Depression screen negative  (In-House)           Negative EtOH screen  (In-House)                   PLAN:          Abdominal pain, unspecified     Today, we have reviewed her care.  I do agree with moving ahead with colonoscopy and will try to expedite that referral.  We will follow from there.     MIPS PHQ-9 Depression Screening: Completed form scanned and in chart; Total Score 6; Negative Depression Screen   Smoking cessation encouraged. Counseling for less than 3 minutes.  Negative alcohol screen           Orders:       NOCHG  No Charge  (In-House)           Depression screen negative  (In-House)           Negative EtOH screen  (In-House)         3014F  Screening mammography results documented and reviewed (PV)1  (In-House)             Patient Education Handouts:       Abdominal Pain              CHARGE CAPTURE           **Please note: ICD descriptions below are intended for billing purposes only and may not represent clinical diagnoses**        Primary Diagnosis:         789.00 Abdominal pain, unspecified            R10.84    Generalized abdominal pain              Orders:          NOCHG   No Charge  (In-House)                Depression screen negative   (In-House)                Negative EtOH screen  (In-House)             3014F   Screening mammography results documented and reviewed (PV)1  (In-House)               ADDENDUMS:      ____________________________________    Addendum: 06/12/2019 01:43 PM - Tabatha Maurer         Visit Note Faxed to:        [facility] Simon  (General Surgery); Number [phone]

## 2021-05-18 NOTE — PROGRESS NOTES
Gabriela Riggs  1975     Office/Outpatient Visit    Visit Date: Fri, Oct 23, 2020 02:34 pm    Provider: Kaylie Jarrell N.P. (Assistant: Nilam Estrada MA)    Location: [facility]        Electronically signed by Kaylie Jarrell N.P. on  10/25/2020 08:07:46 PM                             Subjective:        CC: Mrs. Riggs is a 44 year old White female.  neck/shoulder pain;         HPI:           Patient complains of cervicalgia.  chronic , intermittent neck pain more consistent in the last couple of months.  no known injury.  feels that PT would be helpful.  last imaging 5 yrs ago per MRI.  had some mild disc issues.  pain is posterior and feels burning down to back of shoulder.    takes ibuprofen 2-3 times per week.  gabapentin helps but has drowsiness on any higher dose than currently taking.      ROS:     CONSTITUTIONAL:  Negative for chills, fatigue, fever, and weight change.      RESPIRATORY:  Negative for cough, dyspnea, and hemoptysis.      MUSCULOSKELETAL:  Positive for arthralgias (neck).      NEUROLOGICAL:  Negative for dizziness, headaches, paresthesias, and weakness.          Past Medical History / Family History / Social History:         Last Reviewed on 8/07/2020 08:47 AM by Candis Carey    Past Medical History:                 PAST MEDICAL HISTORY         Hypothyroidism         PREVENTIVE HEALTH MAINTENANCE             COLORECTAL CANCER SCREENING: Up to date (colonoscopy q10y; sigmoidoscopy q5y; Cologuard q3y) was last done 7/18/2019, Results are in chart; colonoscopy with normal results     MAMMOGRAM: Done within last 2 years and results in are chart was last done 1/13/2020 with normal results     PAP SMEAR: was last done s/p total hysterectomy         Surgical History:         Cholecystectomy: 2014;     Hysterectomy: 2000; uterus only;     Tubal Ligation: at age in her 20s;     L breast lumpectomy - benign;         Family History:     Father: Type 1 Diabetes     Mother:  "Healthy     Maternal Grandmother: \"thyroid issues and nodules\"         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          Substance Abuse History:     Last Reviewed on 8/07/2020 08:47 AM by Candis Carey        Mental Health History:     Last Reviewed on 8/07/2020 08:47 AM by Candis Carey        Communicable Diseases (eg STDs):     Last Reviewed on 8/07/2020 08:47 AM by Candis Carey        Current Problems:     Last Reviewed on 8/07/2020 08:47 AM by Candis Carey    Hypothyroidism, unspecified    Other fatigue    Other long term (current) drug therapy    Low back pain    Generalized abdominal pain    Allergic rhinitis due to pollen    Tachycardia, unspecified    Anxiety disorder, unspecified    Depression - Major depressive disorder, recurrent, moderate    Other microscopic hematuria    Encounter for general adult medical examination without abnormal findings    Encounter for immunization        Immunizations:     Influenza, injectable, MDCK, preservative free, quadrivalent 10/2/2020    Tdap (ADACEL TDAP) 8/7/2020    Fluzone (3 + years dose) 10/1/2016    Fluzone (3 + years dose) 11/1/2017    Fluzone Quadrivalent (3+ years) 12/17/2018        Allergies:     Last Reviewed on 10/23/2020 02:38 PM by Nilam Estrada    Fluoxetine: tachycardia         Current Medications:     Last Reviewed on 10/23/2020 02:38 PM by Nilam Estrada    cetirizine 10 mg oral tablet [take 1 tablet (10 mg) by oral route once daily]    Flonase Allergy Relief 50 mcg/actuation Intranasal Spray, Suspension [1 spray each nostril bid prn]    escitalopram oxalate 10 mg oral tablet [take ONE tablet by MOUTH ONCE daily]    gabapentin 100 mg oral capsule " [take 1 capsule (100 mg) by oral route 2 times per day]        Objective:        Vitals:         Historical:     8/7/2020  Wt:   159.4lbs7/10/2020  Wt:   156.8lbs    Current: 10/23/2020 2:40:11 PM    Ht:  5 ft, 5 in;  Wt: 162.4 lbs;  BMI: 27.0T: 97.5 F (temporal);  BP: 125/78 mm Hg (left arm, sitting);  P: 76 bpm (left arm (BP Cuff), sitting);  sCr: 0.75 mg/dL;  GFR: 96.95        Exams:     PHYSICAL EXAM:     GENERAL: no apparent distress;     RESPIRATORY: normal respiratory rate and pattern with no distress; normal breath sounds with no rales, rhonchi, wheezes or rubs;     CARDIOVASCULAR: normal rate; rhythm is regular;     LYMPHATIC: no axillary adenopathy;     MUSCULOSKELETAL: pain with range of motion in: neck extension and rotation;     NEUROLOGIC: mental status: alert and oriented x 3; GROSSLY INTACT     PSYCHIATRIC:  appropriate affect and demeanor; normal speech pattern; grossly normal memory;         Assessment:         M54.2   Cervicalgia           ORDERS:         Meds Prescribed:       [Queued New Rx] predniSONE 5 mg oral tablet [take 8 tablets on day one, 6 tablets on day 2, 4 tablets on day 3, 2 tablets on day 4 and 1 tablet on day 5], #21 (twenty one) tablets, Refills: 0 (zero)       [Queued New Rx] baclofen 10 mg oral tablet [take 1/2 to 1 tablet bid prn], #30 (thirty) tablets, Refills: 0 (zero)         Procedures Ordered:       [facility]  ([facility]Out)                      Plan:         Cervicalgia        REFERRALS:  Referral initiated to physical therapy ( [facility] ) for evaluation and treatment.      RECOMMENDATIONS given include: cold packs, moist heat, massage, and baclofen may cause drowsiness..            Prescriptions:       [Queued New Rx] predniSONE 5 mg oral tablet [take 8 tablets on day one, 6 tablets on day 2, 4 tablets on day 3, 2 tablets on day 4 and 1 tablet on day 5], #21 (twenty one) tablets, Refills: 0 (zero)       [Queued New Rx] baclofen  10 mg oral tablet [take 1/2 to 1 tablet bid prn], #30 (thirty) tablets, Refills: 0 (zero)           Orders:       [facility]  Physical/Occupational Therapy Referral  (Send-Out)                  Patient Recommendations:        For  Cervicalgia:    Try cold packs on the tight, painful areas in the neck. Heat applied to the neck may help relieve pain and stiffness (i.e. hot tub, shower, heating pad, hot water bottle). Massage the tight muscles in the back of the neck to help relieve the pain.              Charge Capture:         Primary Diagnosis:     M54.2  Cervicalgia           Orders:      15164  Office/outpatient visit; established patient, level 3  (In-House)

## 2021-05-18 NOTE — PROGRESS NOTES
Gabriela Riggs EMMETT  1975     Office/Outpatient Visit    Visit Date: Wed, Mar 3, 2021 09:26 am    Provider: Kandi Davis N.P. (Assistant: Kenyatta Magaña MA)    Location: [facility]        Electronically signed by Kandi Davis N.P. on  03/03/2021 12:37:39 PM                             Subjective:        CC: D[employer] [phone]. Keely is a 45 year old White female.  Covid at the beginning of February, and still continuing to have headaches frequently;         HPI:       headache     The symptom began >2 weeks ago.  Symptoms are relieved with Motrin 400 mg prn and Ice pack.  Associated symptoms include fatigue and loss of taste and smell.  Prior work-up has included COVID + 2-8-21.  Worried that she cannot take lexapro and Motrin Today's encounter is being done with a telehealth visit. She has consented verbally with two witnesses for todays treatment. Todays visit is being conducted by audio and video. Individuals present during the telemedicine consultation include Farhad Ochoa  &  JL Salas     ROS:     CONSTITUTIONAL:  Negative for fever.      E/N/T:  Positive for nasal congestion ( taking Benadryl ).      CARDIOVASCULAR:  Negative for chest pain, palpitations, tachycardia, orthopnea, and edema.      RESPIRATORY:  Negative for recent cough and dyspnea.      NEUROLOGICAL:  Negative for weakness.      PSYCHIATRIC:  Positive for feelings of stress ( (has improved, her  who also had covid is better, he had the infusion and has IPF) ).          Past Medical History / Family History / Social History:         Last Reviewed on 3/03/2021 10:02 AM by Kandi Davis    Past Medical History:                 PAST MEDICAL HISTORY         Hypothyroidism         PREVENTIVE HEALTH MAINTENANCE             COLORECTAL CANCER SCREENING: Up to date (colonoscopy q10y; sigmoidoscopy q5y; Cologuard q3y) was last done 7/18/2019, Results are in chart; colonoscopy with normal  "results     MAMMOGRAM: Done within last 2 years and results in are chart was last done 1/13/2020 with normal results     PAP SMEAR: was last done s/p total hysterectomy         Surgical History:         Cholecystectomy: 2014;     Hysterectomy: 2000; uterus only;     Tubal Ligation: at age in her 20s;     L breast lumpectomy - benign;         Family History:     Father: Type 1 Diabetes     Mother: Healthy     Maternal Grandmother: \"thyroid issues and nodules\"         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          Substance Abuse History:     Last Reviewed on 2/10/2021 02:19 PM by Candis Carey        Mental Health History:     Last Reviewed on 2/10/2021 02:19 PM by Candis Carey        Communicable Diseases (eg STDs):     Last Reviewed on 2/10/2021 02:19 PM by Candis Carey        Immunizations:     Influenza, injectable, MDCK, preservative free, quadrivalent 10/2/2020    Tdap (ADACEL TDAP) 8/7/2020    Fluzone (3 + years dose) 10/1/2016    Fluzone (3 + years dose) 11/1/2017    Fluzone Quadrivalent (3+ years) 12/17/2018        Allergies:     Last Reviewed on 3/03/2021 09:27 AM by Kenyatta Magaña    Fluoxetine: tachycardia         Current Medications:     Last Reviewed on 3/03/2021 09:27 AM by Kenyatta Magaña    cetirizine 10 mg oral tablet [take 1 tablet (10 mg) by oral route once daily]    Flonase Allergy Relief 50 mcg/actuation Intranasal Spray, Suspension [1 spray each nostril bid prn]    escitalopram oxalate 10 mg oral tablet [take ONE tablet by MOUTH ONCE daily]    gabapentin 100 mg oral capsule [take 1 capsule (100 mg) by oral route 2 times per day]    buPROPion  mg oral tablet, " sustained-release 12 hr [take 1 tablet (150 mg) by oral route 2 times per day]        Objective:        Exams:     PHYSICAL EXAM:     GENERAL: vital signs recorded - well developed, well nourished;  no apparent distress;     RESPIRATORY: normal appearance and symmetric expansion of chest wall;     NEUROLOGIC: GROSSLY INTACT     PSYCHIATRIC:  appropriate affect and demeanor; normal speech pattern; grossly normal memory;         Assessment:         R51.9   Headache, unspecified       F41.9   Anxiety disorder, unspecified           Plan:         Headache, unspecifiedreviewed last labs 2-2020 and her + COVID test last month and that visit note after her test, continue to take Ibuprofen 200- 400 mg every 8 hours, stay well hydrated        RECOMMENDATIONS given include: rest and increase fluids.  Telehealth: Verbal consent obtained for visit to occur via televideo conferencing     FOLLOW-UP: Advised to call if there is no improvement Follow-up by phone if no improvement in 1 week..  :.          Anxiety disorder, unspecifiedcontinue lexapro ( she has not started wellbutrin yet)             Patient Recommendations:        For  Headache, unspecified:    Get plenty of rest.  Follow-up by phone if no improvement in 1 week.                APPOINTMENT INFORMATION:        Monday Tuesday Wednesday Thursday Friday Saturday Sunday            Time:___________________AM  PM   Date:_____________________             Charge Capture:         Primary Diagnosis:     R51.9  Headache, unspecified           Orders:      22395  Office/outpatient visit; established patient, level 3  (In-House)              F41.9  Anxiety disorder, unspecified

## 2021-05-18 NOTE — PROGRESS NOTES
Gabriela Riggs EMMETT  1975     Office/Outpatient Visit    Visit Date: Fri, Jul 10, 2020 01:08 pm    Provider: Abbey Ortiz N.P. (Assistant: Ana Ellison LPN)    Location: [facility]        Electronically signed by Abbey Ortiz N.P. on  07/12/2020 11:39:42 PM                             Subjective:        CC: Mrs. Riggs is a 44 year old White female.  She presents with dysuria.          HPI:           Dysuria noted.  Associated symptoms include abdominal pain, hematuria, hesitancy, urgency and urinary frequency.  She denies associated flank pain.  Mrs. Riggs states that she had multiple prior episodes of similar discomfort.  Medical history is pertinent for recurrent UTIs.  She denies a history of renal stones.  Treatments tried thus far include Increasing fluid intake.  Treatment effectivenss has been generally ineffective.      ROS:     CONSTITUTIONAL:  Negative for chills, fatigue and fever.      CARDIOVASCULAR:  Negative for chest pain and pedal edema.      RESPIRATORY:  Negative for recent cough and dyspnea.      GASTROINTESTINAL:  Positive for urinary frequency.   Negative for abdominal pain, constipation, diarrhea, heartburn, nausea or vomiting.      GENITOURINARY:  Positive for dysuria.   Negative for change in urine stream.      MUSCULOSKELETAL:  Negative for arthralgias and myalgias.          Past Medical History / Family History / Social History:         Last Reviewed on 6/11/2020 11:40 AM by Watson Patricia    Past Medical History:                 PAST MEDICAL HISTORY         Hypothyroidism         PREVENTIVE HEALTH MAINTENANCE             COLORECTAL CANCER SCREENING: Up to date (colonoscopy q10y; sigmoidoscopy q5y; Cologuard q3y) was last done 7/18/2019, Results are in chart; colonoscopy with normal results     MAMMOGRAM: Done within last 2 years and results in are chart was last done 1/13/2020 with normal results     PAP SMEAR: was last done s/p total hysterectomy  "        Surgical History:         Cholecystectomy: 2014;     Hysterectomy: 2000; uterus only;     Tubal Ligation: at age in her 20s;     L breast lumpectomy - benign;         Family History:     Father: Type 1 Diabetes     Mother: Healthy     Maternal Grandmother: \"thyroid issues and nodules\"         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          Substance Abuse History:     Last Reviewed on 6/11/2020 11:40 AM by Watson Patricia        Mental Health History:     Last Reviewed on 6/11/2020 11:40 AM by Watson Patricia        Communicable Diseases (eg STDs):     Last Reviewed on 6/11/2020 11:40 AM by Watson Patricia        Current Problems:     Last Reviewed on 6/11/2020 11:40 AM by Watson Patricia    Hypothyroidism, unspecified    Other fatigue    Other long term (current) drug therapy    Low back pain    Generalized abdominal pain    Allergic rhinitis due to pollen    Tachycardia, unspecified    Anxiety disorder, unspecified    Major depressive disorder, recurrent, moderate    Encounter for screening for lipoid disorders    Encounter for screening for depression    Acute sinusitis, unspecified        Immunizations:     Fluzone (3 + years dose) 10/1/2016    Fluzone (3 + years dose) 11/1/2017    Fluzone Quadrivalent (3+ years) 12/17/2018        Allergies:     Last Reviewed on 6/11/2020 11:40 AM by Watson Patricia    Fluoxetine: tachycardia         Current Medications:     Last Reviewed on 6/11/2020 11:40 AM by Watson Patricia    Gabapentin 300 mg oral capsule [1 cap TID]    fexofenadine 180 mg oral tablet [1 tab daily]    Flonase Allergy Relief 50 mcg/actuation Intranasal Spray, Suspension [1 spray each nostril bid prn]    " escitalopram oxalate 10 mg oral tablet [take ONE tablet by MOUTH ONCE daily]    amoxicillin-pot clavulanate 875-125 mg oral tablet [take 1 tablet by oral route every 12 hours]        Objective:        Vitals:         Current: 7/10/2020 1:11:02 PM    Ht:  5 ft, 5 in;  Wt: 156.8 lbs;  BMI: 26.1T: 98 F (oral);  BP: 110/69 mm Hg (left arm, sitting);  P: 77 bpm (left arm (BP Cuff), sitting);  sCr: 0.75 mg/dL;  GFR: 95.52        Exams:     PHYSICAL EXAM:     GENERAL: vital signs recorded - well developed, well nourished;  no apparent distress;     RESPIRATORY: normal appearance and symmetric expansion of chest wall; normal respiratory rate and pattern with no distress; normal breath sounds with no rales, rhonchi, wheezes or rubs;     CARDIOVASCULAR: normal rate; rhythm is regular;  no edema;     GASTROINTESTINAL: nontender;     MUSCULOSKELETAL: normal gait; normal range of motion of all major muscle groups; no limb or joint pain with range of motion;     NEUROLOGIC: mental status: alert and oriented x 3;     PSYCHIATRIC: appropriate affect and demeanor; normal speech pattern; normal thought and perception;         Lab/Test Results:         Urine CX: 81380 cfu/ml (07/10/2020),             Assessment:         R30.0   Dysuria       R31.29   Other microscopic hematuria       H92.01   Otalgia, right ear           ORDERS:         Meds Prescribed:       [New Rx] Macrobid 100 mg oral capsule [take 1 capsule (100 mg) by oral route 2 times per day with food x 3 days], #6 (six) capsules, Refills: 0 (zero)         Lab Orders:       [id number]  [facility] - [facility] Urine Culture  (Send-Out)            [id number]  Urinalysis, automated, without microscopy  (In-House)                      Plan:         Dysuria    LABORATORY:  Labs ordered to be performed today include Urine culture and UA dip in office no micro.            Orders:       [id number]  [facility] - [facility] Urine Culture  (Send-Out)            [id number]  Urinalysis, automated, without microscopy  (In-House)               Other microscopic hematuria          Prescriptions:       [New Rx] Macrobid 100 mg oral capsule [take 1 capsule (100 mg) by oral route 2 times per day with food x 3 days], #6 (six) capsules, Refills: 0 (zero)             Charge Capture:         Primary Diagnosis:     R30.0  Dysuria           Orders:      [id number]  Office/outpatient visit; established patient, level 3  (In-House)            [id number]  Urinalysis, automated, without microscopy  (In-House)              R31.29  Other microscopic hematuria     H92.01  Otalgia, right ear

## 2021-05-18 NOTE — PROGRESS NOTES
Gabriela Riggs  1975     Office/Outpatient Visit    Visit Date: Fri, Aug 7, 2020 08:36 am    Provider: Candis Carey MD (Assistant: Alba Cortes LPN)    Location: [facility]        Electronically signed by Candis Carey MD on  08/07/2020 12:50:41 PM                             Subjective:        CC: annual physical    HPI:       Gabriela is here today for annual physical.  She is UTD on colonoscopy, last done 7/2019 and this was normal.  Pap smear is no longer indicated by history; s/p hysterectomy.  She is UTD on mammogram, last done 1/2020 and this was normal.  She is UTD on flu (10/2019).  She is due for Td.  She is UTD on routine labs.        She is on a low dose of gabapentin prn for chronic low back pain.  This controls her sx well.  No adverse effects.  She is due for a tox screen today.    Patient to be evaluated for encounter for general adult medical examination without abnormal findings.  Her last physical exam was 1 year ago.  She is status-post hysterectomy.  She is not currently using any form of contraception.  She performs breast self-exams monthly.   Her last Pap smear was 1 year ago and was normal.   Her last mammogram was 1 year ago and was normal.   Preventative Health updated today.  Mrs. Riggs denies any history of abnormal Pap smears.  Tobacco: Current Smoker: She currently smokes every day, 1 pack per day.            PHQ-9 Depression Screening: Completed form scanned and in chart; Total Score 3     ROS:     CONSTITUTIONAL:  Negative for fatigue and fever.      EYES:  Negative for blurred vision.      E/N/T:  Negative for diminished hearing and nasal congestion.      CARDIOVASCULAR:  Negative for chest pain and palpitations.      RESPIRATORY:  Negative for recent cough and dyspnea.      GASTROINTESTINAL:  Negative for abdominal pain, constipation, diarrhea, nausea and vomiting.      GENITOURINARY:  Negative for dysuria and urinary incontinence.      MUSCULOSKELETAL:  " Negative for arthralgias and myalgias.      NEUROLOGICAL:  Negative for paresthesias and weakness.      PSYCHIATRIC:  Negative for anxiety, depression and sleep disturbance.          Past Medical History / Family History / Social History:         Last Reviewed on 8/07/2020 08:47 AM by Candis Carey    Past Medical History:                 PAST MEDICAL HISTORY         Hypothyroidism         PREVENTIVE HEALTH MAINTENANCE             COLORECTAL CANCER SCREENING: Up to date (colonoscopy q10y; sigmoidoscopy q5y; Cologuard q3y) was last done 7/18/2019, Results are in chart; colonoscopy with normal results     MAMMOGRAM: Done within last 2 years and results in are chart was last done 1/13/2020 with normal results     PAP SMEAR: was last done s/p total hysterectomy         Surgical History:         Cholecystectomy: 2014;     Hysterectomy: 2000; uterus only;     Tubal Ligation: at age in her 20s;     L breast lumpectomy - benign;         Family History:     Father: Type 1 Diabetes     Mother: Healthy     Maternal Grandmother: \"thyroid issues and nodules\"         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          Substance Abuse History:     Last Reviewed on 8/07/2020 08:47 AM by Candis Carey        Mental Health History:     Last Reviewed on 8/07/2020 08:47 AM by Candis Carey        Communicable Diseases (eg STDs):     Last Reviewed on 8/07/2020 08:47 AM by Candis Carey        Current Problems:     Last Reviewed on 7/10/2020 01:14 PM by Abbey Ortiz    Hypothyroidism, unspecified    Other fatigue    Other long term (current) drug therapy    Low back pain    Generalized abdominal pain    Allergic rhinitis due to " pollen    Tachycardia, unspecified    Anxiety disorder, unspecified    Major depressive disorder, recurrent, moderate    Encounter for screening for lipoid disorders    Encounter for screening for depression    Acute sinusitis, unspecified    Otalgia, right ear    Dysuria    Other microscopic hematuria        Immunizations:     Fluzone (3 + years dose) 10/1/2016    Fluzone (3 + years dose) 11/1/2017    Fluzone Quadrivalent (3+ years) 12/17/2018        Allergies:     Last Reviewed on 7/10/2020 01:08 PM by Ana Ellison    Fluoxetine: tachycardia         Current Medications:     Last Reviewed on 7/10/2020 01:08 PM by Ana Ellison    gabapentin 100 mg oral capsule [take 1 capsule (100 mg) by oral route 2 times per day]    cetirizine 10 mg oral tablet [take 1 tablet (10 mg) by oral route once daily]    Flonase Allergy Relief 50 mcg/actuation Intranasal Spray, Suspension [1 spray each nostril bid prn]    escitalopram oxalate 10 mg oral tablet [take ONE tablet by MOUTH ONCE daily]    Macrobid 100 mg oral capsule [take 1 capsule (100 mg) by oral route 2 times per day with food x 4 more days for a total of 7 days]        Objective:        Vitals:         Current: 8/7/2020 8:41:51 AM    Ht:  5 ft, 5 in;  Wt: 159.4 lbs;  BMI: 26.5T: 98.1 F (temporal);  BP: 107/75 mm Hg (left arm, sitting);  P: 75 bpm (left arm (BP Cuff), sitting);  sCr: 0.75 mg/dL;  GFR: 96.19        Exams:     PHYSICAL EXAM:     GENERAL: vital signs recorded - well developed, well nourished;  no apparent distress;     EYES: extraocular movements intact; conjunctiva and cornea are normal; PERRLA;     RESPIRATORY: normal respiratory rate and pattern with no distress; normal breath sounds with no rales, rhonchi, wheezes or rubs;     CARDIOVASCULAR: normal rate; rhythm is regular;  no systolic murmur; no edema;     GASTROINTESTINAL: nontender; normal bowel sounds;     MUSCULOSKELETAL: normal gait; normal overall tone     NEUROLOGIC: mental  status: alert and oriented x 3; Reflexes: brachioradialis: 2+; knee jerks: 2+;     PSYCHIATRIC: appropriate affect and demeanor; normal psychomotor function; normal speech pattern;         Lab/Test Results:         Urine temperature: confirmed (08/07/2020),     All urine drug screen levels confirmed negative: yes (08/07/2020),     Date and time of last pill: Gabapentin 8/7/2020 at 930p.m. (08/07/2020),     Performed by: tls (08/07/2020),     Collection Time: 0850 (08/07/2020),             Procedures:     Encounter for immunization    1. Tdap: 0.5 ml unit dose given IM in the right upper arm; administered by clr;  lot number [id number]; expires 9/17/2021             Assessment:         Z00.00   Encounter for general adult medical examination without abnormal findings       M54.5   Low back pain       Z79.899   Other long term (current) drug therapy       F41.9   Anxiety disorder, unspecified       Z13.31   Encounter for screening for depression       Z23   Encounter for immunization           ORDERS:         Meds Prescribed:       [Refilled] escitalopram oxalate 10 mg oral tablet [take ONE tablet by MOUTH ONCE daily], #90 (ninety) tablets, Refills: 1 (one)       [Refilled] gabapentin 100 mg oral capsule [take 1 capsule (100 mg) by oral route 2 times per day], #180 (one hundred and eighty) capsules, Refills: 1 (one)         Radiology/Test Orders:       3017F  Colorectal CA screen results documented and reviewed (PV)  (In-House)              Lab Orders:       APPTO  Appointment need  (In-House)            97787  Drug test prsmv qual dir optical obs per day  (In-House)              Procedures Ordered:       28434  Tetanus, diphtheria toxoid and acellular pertussis vaccine (TdaP),  for use in individuals seven years or older, for intramuscular use  (In-House)              Other Orders:         Depression screen negative  (In-House)              Screening mammogram results documented  (Send-Out)                       Plan:         Encounter for general adult medical examination without abnormal findingsGabriela is here today for annual physical.  She is UTD on colonoscopy, last done 7/2019 and this was normal.  Pap smear is no longer indicated by history; s/p hysterectomy.  She is UTD on mammogram, last done 1/2020 and this was normal.  She is UTD on flu (10/2019).  She is due for Tdap; given today.  She is UTD on routine labs.  RTC 6 months.    MIPS PHQ-9 Depression Screening: Completed form scanned and in chart; Total Score 3; Negative Depression Screen  Smoking cessation encouraged. Counseling for less than 3 minutes.      FOLLOW-UP: Schedule a follow-up visit in 6 months.:.  f/u anxiety with Maciuba          Orders:         Depression screen negative  (In-House)              Screening mammogram results documented  (Send-Out)            3017F  Colorectal CA screen results documented and reviewed (PV)  (In-House)            APPTO  Appointment need  (In-House)              Low back pain          Prescriptions:       [Refilled] gabapentin 100 mg oral capsule [take 1 capsule (100 mg) by oral route 2 times per day], #180 (one hundred and eighty) capsules, Refills: 1 (one)         Other long term (current) drug therapy    Controlled substance documentation: Julius reviewed; drug screen performed and appropriate; consent is reviewed and signed and on the chart.  She is aware of risk of addiction on this medication, understands that she will need to follow up for a review every 3 months and her medications will be adjusted or decreased as deemed appropriate at each visit.  No history of drug or alcohol abuse.  No concerns about diversion or abuse. She denies side effects related to the medication.  She is aware that she may be called in for pill counts.  The dosing of this medication will be reviewed on a regular basis and reduced if possible..  Ongoing use of a controlled substance is necessary for this patient to have a  normal quality of life           Orders:       34127  Drug test prsmv qual dir optical obs per day  (In-House)              Anxiety disorder, unspecified          Prescriptions:       [Refilled] escitalopram oxalate 10 mg oral tablet [take ONE tablet by MOUTH ONCE daily], #90 (ninety) tablets, Refills: 1 (one)         Encounter for immunizationDue for Td and she has a new grandbaby on the way!  Boostrix given today.        IMMUNIZATIONS given today: Boostrix (need ABN not covered by Medicare).            Immunizations:       92684  Tetanus, diphtheria toxoid and acellular pertussis vaccine (TdaP),  for use in individuals seven years or older, for intramuscular use  (In-House)                Dose (ml): 0.5  Site: right deltoid  Route: intramuscular  Administered by: Alba Cortes          : [employer]  Lot #: [id number]  Exp: 09/17/2021          NDC: [address]-25            Patient Recommendations:        For  Encounter for general adult medical examination without abnormal findings:    Schedule a follow-up visit in 6 months.                APPOINTMENT INFORMATION:        Monday Tuesday Wednesday Thursday Friday Saturday Sunday            Time:___________________AM  PM   Date:_____________________             Charge Capture:         Primary Diagnosis:     Z00.00  Encounter for general adult medical examination without abnormal findings           Orders:      41204  Preventive medicine, established patient, age 40-64 years  (In-House)              Depression screen negative  (In-House)            3017F  Colorectal CA screen results documented and reviewed (PV)  (In-House)            APPTO  Appointment need  (In-House)              M54.5  Low back pain     Z79.899  Other long term (current) drug therapy           Orders:      02955  Drug test prsmv qual dir optical obs per day  (In-House)              F41.9  Anxiety disorder, unspecified     Z13.31  Encounter for screening for depression     Z23   Encounter for immunization           Orders:      06602  Tetanus, diphtheria toxoid and acellular pertussis vaccine (TdaP),  for use in individuals seven years or older, for intramuscular use  (In-House)

## 2021-05-18 NOTE — PROGRESS NOTES
Gabriela Riggs. 1975     Office/Outpatient Visit    Visit Date: Wed, Jun 27, 2018 01:41 pm    Provider: Candis Carey MD (Assistant: Jena Whitlock MA)    Location: [facility]        Electronically signed by Candis Carey MD on  06/27/2018 03:55:14 PM                             SUBJECTIVE:        CC:     Ms. Riggs is a 42 year old White female.  This is a follow-up visit.  med refill;         HPI: Gabriela is here to get back onto gabapentin, which she has used in the past for control of her chronic back pain.  She had not taken any in over a year because she felt it was causing her to have some swelling.  However, about a month ago, she started taking some of her old rx because the back pain got worse.  It is really working quite well to control the pain at this point.  She has been trying to work out and she has lost 12 lbs.  The weight loss has helped the pain as well.  She is using it pretty regularly three times a day, 200 mg (100 mg tabs x2).  No adverse effects, no sedation or swelling.     ROS:     CONSTITUTIONAL:  Positive for fatigue and night sweats.   Negative for fever.      EYES:  Negative for blurred vision.      E/N/T:  Negative for diminished hearing and nasal congestion.      CARDIOVASCULAR:  Positive for pedal edema ( baseline lymphedema in the LLE (leg was slammed accidentally in the car door when she was a child) ).   Negative for chest pain or palpitations.      RESPIRATORY:  Negative for recent cough and dyspnea.      GASTROINTESTINAL:  Negative for abdominal pain, constipation, diarrhea, nausea and vomiting.      MUSCULOSKELETAL:  Positive for back pain.   Negative for arthralgias or myalgias.      NEUROLOGICAL:  Negative for paresthesias and weakness.          PMH/FMH/SH:     Last Reviewed on 6/27/2018 01:50 PM by Candis Carey    Past Medical History:                 PAST MEDICAL HISTORY         Hypothyroidism         PREVENTIVE HEALTH MAINTENANCE              "MAMMOGRAM: Done within last 2 years and results in are chart was last done 6/20/2018 with normal results     PAP SMEAR: was last done s/p total hysterectomy         Surgical History:         Cholecystectomy: 2014;     Hysterectomy: 2000; uterus only;     Tubal Ligation: at age in her 20s;      L breast lumpectomy - benign;         Family History:     Father: Type 1 Diabetes     Mother: Healthy     Maternal Grandmother: \"thyroid issues and nodules\"         Social History:     Occupation:  at [employer];     Marital Status:      Children: 2 children         Tobacco/Alcohol/Supplements:     Last Reviewed on 6/27/2018 01:50 PM by Candis Carey    Tobacco: Current Smoker: She currently smokes every day, 1 pack per day; (start age 18 pack-year history).          Substance Abuse History:     Last Reviewed on 6/27/2018 01:50 PM by Candis Carey        Mental Health History:     Last Reviewed on 6/27/2018 01:50 PM by Candis Carey        Communicable Diseases (eg STDs):     Last Reviewed on 6/27/2018 01:50 PM by Candis Carey            Current Problems:     Last Reviewed on 4/04/2018 03:23 PM by Candis Carey    Fatigue     Screening for lipoid disorders     Hypothyroidism         Immunizations:     Fluzone (3 + years dose) 10/1/2016     Fluzone (3 + years dose) 11/1/2017         Allergies:     Last Reviewed on 6/27/2018 01:46 PM by Jena Whitlock      No Known Drug Allergies.         Current Medications:     Last Reviewed on 6/27/2018 01:46 PM by Jena Whitlock    Synthroid 0.025mg Tablet Take 1 tablet(s) by mouth daily     Gabapentin 100mg Capsules Give 2 capsules by mouth 3 times daily     Fexofenadine HCl 180mg Tablet 1 tab daily     Sudafed 12 Hour 120mg Tablets, Extended Release Take 1 tablet(s) by mouth  daily         OBJECTIVE:        Vitals:         Current: 6/27/2018 1:46:18 PM    Ht:  5 ft, 5 in;  Wt: 151.6 lbs;  BMI: 25.2    T: 97.3 F (oral);  BP: 110/72 mm Hg (left arm, " sitting);  P: 82 bpm (left arm (BP Cuff), sitting);  sCr: 0.78 mg/dL;  GFR: 92.42        Exams:     PHYSICAL EXAM:     GENERAL: vital signs recorded - well developed, well nourished;  well groomed;  no apparent distress;     EYES: extraocular movements intact; conjunctiva and cornea are normal; PERRLA;     E/N/T: OROPHARYNX:  normal mucosa, dentition, gingiva, and posterior pharynx;     RESPIRATORY: normal respiratory rate and pattern with no distress; normal breath sounds with no rales, rhonchi, wheezes or rubs;     CARDIOVASCULAR: normal rate; rhythm is regular;  no systolic murmur; no edema;     MUSCULOSKELETAL: normal gait; normal overall tone         Lab/Test Results:             Urine temperature:  confirmed (06/27/2018),     All urine drug screen levels confirmed negative:  yes (06/27/2018),     Date and time of last pill:  Gabapentin 6/27/18@10:00am (06/27/2018),     Performed by:  atc (06/27/2018),     Collection Time:  1408 (06/27/2018),             ASSESSMENT           724.2   M54.5  Chronic low back pain              DDx:     V58.69   Z79.899  Use of high risk medications              DDx:         ORDERS:         Meds Prescribed:       Refill of: Gabapentin 100mg Capsules Give 2 capsules by mouth 3 times daily  #180 (One [place] and [place]) capsule(s) Refills: 5         Lab Orders:       APPTO  Appointment need  (In-House)         99292  Drug test prsmv qual dir optical obs per day  (In-House)                   PLAN:          Chronic low back pain She has really noticed a big difference since getting back on the gabapentin.  Back pain is much improved and has enabled her to be more active and start exercising again.  She has lost 12 lbs!  This in turn is helping the back pain as well.  No adverse effects.  She does require ongoing use of this controlled substance to function.  Refills sent today.  Tox screen and Julius run.  Contract signed.  RTC 6 months.         FOLLOW-UP: Schedule a follow-up visit in  6 months..  hypothyroidism w/ Maciuba           Prescriptions:       Refill of: Gabapentin 100mg Capsules Give 2 capsules by mouth 3 times daily  #180 (One [place] and [place]) capsule(s) Refills: 5           Orders:       APPTO  Appointment need  (In-House)             Patient Education Handouts:       [facility] Medication Compliance           Use of high risk medications     Controlled substance documentation: Julius reviewed; drug screen performed and appropriate; consent is reviewed and signed and on the chart.  She is aware of risk of addiction on this medication, understands that she will need to follow up for a review every 3 months and her medications will be adjusted or decreased as deemed appropriate at each visit.  No history of drug or alcohol abuse.  No concerns about diversion or abuse. She denies side effects related to the medication.  She is aware that she may be called in for pill counts.  The dosing of this medication will be reviewed on a regular basis and reduced if possible..  Ongoing use of a controlled substance is necessary for this patient to have a normal quality of life Drug screen           Orders:       31598  Drug test prsmv qual dir optical obs per day  (In-House)               Patient Recommendations:        For  Chronic low back pain:     Schedule a follow-up visit in 6 months.                APPOINTMENT INFORMATION:        Monday Tuesday Wednesday Thursday Friday Saturday Sunday            Time:___________________AM  PM   Date:_____________________             CHARGE CAPTURE           **Please note: ICD descriptions below are intended for billing purposes only and may not represent clinical diagnoses**        Primary Diagnosis:         724.2 Chronic low back pain            M54.5    Low back pain              Orders:          93041   Office/outpatient visit; established patient, level 3  (In-House)             APPTO   Appointment need  (In-House)           V58.69 Use of high risk  medications            Z79.899    Other long term (current) drug therapy              Orders:          11196   Drug test prsmv qual dir optical obs per day  (In-House)

## 2021-05-18 NOTE — PROGRESS NOTES
Gabriela Riggs  1975     Office/Outpatient Visit    Visit Date: Wed, Dec 23, 2020 09:03 am    Provider: Yajaira Sandoval N.P. (Assistant: Kandi Carcamo LPN)    Location: [facility]        Electronically signed by Yajaira Sandoval N.P. on  12/23/2020 10:30:26 AM                             Subjective:        CC: Mrs. Riggs is a 44 year old White female.  Left earache pain going down from ear to neck;         HPI: 44-year-old female presenting to the office complaining of left maxillary sinus pressure and left ear pain x2 weeks.  Patient takes daily allergy medication along with Flonase.  She has also taken Tylenol for symptoms.  No known ill contacts.         Patient states she is a smoker and is going to try to quit on January 1st.  She has cut down to half a pack.  She states that she is going to try to use gum.  She has used Wellbutrin in the past but is on Lexapro and did not think that she could take along with it.  She states that it did help her quit in the past.  No history of seizures.  Patient also states that she has gained 20 pounds in the past year, may be due from working from home and from starting on a SSRI.    ROS:     CONSTITUTIONAL:  Negative for chills, fatigue and fever.      EYES:  Negative for blurred vision.      E/N/T:  Positive for nasal congestion.   Negative for diminished hearing, tinnitus, frequent rhinorrhea or sore throat.      CARDIOVASCULAR:  Negative for chest pain and palpitations.      RESPIRATORY:  Negative for recent cough and dyspnea.      GASTROINTESTINAL:  Negative for abdominal pain, diarrhea, nausea and vomiting.      MUSCULOSKELETAL:  Negative for myalgias.      INTEGUMENTARY/BREAST:  Negative for rash.      NEUROLOGICAL:  Negative for dizziness, paresthesias and weakness.      PSYCHIATRIC:  Negative for anxiety, depression, sleep disturbance and suicidal thoughts.          Past Medical History / Family History / Social History:         Last  "Reviewed on 12/23/2020 09:24 AM by Yajaira Sandoval    Past Medical History:                 PAST MEDICAL HISTORY         Hypothyroidism         PREVENTIVE HEALTH MAINTENANCE             COLORECTAL CANCER SCREENING: Up to date (colonoscopy q10y; sigmoidoscopy q5y; Cologuard q3y) was last done 7/18/2019, Results are in chart; colonoscopy with normal results     MAMMOGRAM: Done within last 2 years and results in are chart was last done 1/13/2020 with normal results     PAP SMEAR: was last done s/p total hysterectomy         Surgical History:         Cholecystectomy: 2014;     Hysterectomy: 2000; uterus only;     Tubal Ligation: at age in her 20s;     L breast lumpectomy - benign;         Family History:     Father: Type 1 Diabetes     Mother: Healthy     Maternal Grandmother: \"thyroid issues and nodules\"         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          Substance Abuse History:     Last Reviewed on 8/07/2020 08:47 AM by Candis Carey        Mental Health History:     Last Reviewed on 8/07/2020 08:47 AM by Candis Carey        Communicable Diseases (eg STDs):     Last Reviewed on 8/07/2020 08:47 AM by Candis Carey        Immunizations:     Influenza, injectable, MDCK, preservative free, quadrivalent 10/2/2020    Tdap (ADACEL TDAP) 8/7/2020    Fluzone (3 + years dose) 10/1/2016    Fluzone (3 + years dose) 11/1/2017    Fluzone Quadrivalent (3+ years) 12/17/2018        Allergies:     Last Reviewed on 12/23/2020 09:24 AM by Yajaira Sandoval    Fluoxetine: tachycardia         Current Medications:     Last Reviewed on 12/23/2020 09:15 AM by Kandi Carcamo    cetirizine 10 mg oral tablet [take 1 tablet (10 " mg) by oral route once daily]    Flonase Allergy Relief 50 mcg/actuation Intranasal Spray, Suspension [1 spray each nostril bid prn]    escitalopram oxalate 10 mg oral tablet [take ONE tablet by MOUTH ONCE daily]    gabapentin 100 mg oral capsule [take 1 capsule (100 mg) by oral route 2 times per day]    baclofen 10 mg oral tablet [take 1/2 to 1 tablet bid prn]        Objective:        Vitals:         Current: 12/23/2020 9:16:43 AM    Ht:  5 ft, 5 in;  Wt: 162.7 lbs;  BMI: 27.1T: 97.3 F (temporal);  BP: 110/72 mm Hg (left arm, sitting);  P: 82 bpm (left arm (BP Cuff), sitting);  sCr: 0.75 mg/dL;  GFR: 97.03        Exams:     PHYSICAL EXAM:     GENERAL: vital signs recorded - well developed, well nourished;  well groomed;  no apparent distress;     EYES: extraocular movements intact; conjunctiva and cornea are normal; PERRLA;     E/N/T: EARS: external auditory canal normal;  the left TM is has fluid behind it;  NOSE: Left maxillary sinus tenderness sinus tenderness present;     RESPIRATORY: normal respiratory rate and pattern with no distress; normal breath sounds with no rales, rhonchi, wheezes or rubs;     CARDIOVASCULAR: normal rate; rhythm is regular;  no systolic murmur; no edema;     LYMPHATIC: no enlargement of cervical or facial nodes;     BREAST/INTEGUMENT: No rash;     MUSCULOSKELETAL: normal gait; normal overall tone     NEUROLOGIC: mental status: alert and oriented x 3;     PSYCHIATRIC:  appropriate affect and demeanor; normal speech pattern; grossly normal memory;         Assessment:         J01.90   Acute sinusitis, unspecified       F17.200   Nicotine dependence, unspecified, uncomplicated       F33.1   Depression - Major depressive disorder, recurrent, moderate           ORDERS:         Meds Prescribed:       [New Rx] buPROPion  mg oral tablet, sustained-release 12 hr [take 1 tablet (150 mg) by oral route 2 times per day], #60 (sixty) tablets, Refills: 1 (one)       [New Rx] Augmentin 014-980  mg oral tablet [take 1 tablet by oral route every 12 hours], #20 (twenty) tablets, Refills: 0 (zero)         Other Orders:         Smoking and Tobacco Cessation 3 to 10 minutes  (In-House)                      Plan:         Acute sinusitis, unspecifiedIncrease fluid intake and rest.  Tylenol/ibuprofen for discomfort/fever.  Continue to take Zyrtec and Flonase.  Complete prescribed antibiotic.  Patient to notify office with any acute concerns or issues.  Patient verbalizes understanding has no further questions upon discharge.          Prescriptions:       [New Rx] Augmentin 875-125 mg oral tablet [take 1 tablet by oral route every 12 hours], #20 (twenty) tablets, Refills: 0 (zero)         Nicotine dependence, unspecified, uncomplicatedDiscuss starting Wellbutrin.  Reassured patient that it is okay to take with Lexapro.  May actually help with weight loss as well.  Can use along with nicotine replacement such as the gum that patient already has.  Patient is to start and pick a date 1 week out for quit date.  This coincides with her original quit date of January 1. Try to avoid activities that promotes or encourages smoking until pt thinks she is able to try. Have a plan of what she can do if urge strikes. (5 minutes discussing smoking cessation). Pt has appt already scheduled with pcp 6 wks from now. Patient is to notify office with any acute concerns or issues prior to this scheduled appointment.  Patient verbalizes understanding, agrees with plan of care and had no further questions upon discharge.          Prescriptions:       [New Rx] buPROPion  mg oral tablet, sustained-release 12 hr [take 1 tablet (150 mg) by oral route 2 times per day], #60 (sixty) tablets, Refills: 1 (one)           Orders:         Smoking and Tobacco Cessation 3 to 10 minutes  (In-House)              Depression - Major depressive disorder, recurrent, moderateContinue Lexapro as prescribed.  Follow-up with PCP as scheduled.   Notify office with any acute concerns or issues prior to scheduled appointment.  Patient verbalizes understanding has no further questions upon discharge.            Charge Capture:         Primary Diagnosis:     J01.90  Acute sinusitis, unspecified           Orders:      42721  Office/outpatient visit; established patient, level 4  (In-House)              F17.200  Nicotine dependence, unspecified, uncomplicated           Orders:        Smoking and Tobacco Cessation 3 to 10 minutes  (In-House)              F33.1  Depression - Major depressive disorder, recurrent, moderate

## 2021-05-18 NOTE — PROGRESS NOTES
"Gabriela Riggs. 1975     Office/Outpatient Visit    Visit Date: Tue, Sep 24, 2019 02:48 pm    Provider: Candis Carey MD (Assistant: Addis Wilson MA)    Location: [facility]        Electronically signed by Candis Carey MD on  09/24/2019 03:57:26 PM                             SUBJECTIVE:        CC:     Mrs. Riggs is a 43 year old White female.  Patient presents today to discuss medications (not taking Lexapro);         HPI: Gabriela is here today to discuss a couple of issues.        Her first \"attack\" started with April.  She had just been talking to Dr. Gomez about her 's diagnosis of IPF.  Her arm and legs went numb while driving.  She had a lot of stomach issues with diarrhea over the summer.  Colonoscopy was normal.  She then had another \"attack\" in July.  She was then started on Prozac for anxiety by Stacey.  Initially, she did fine, but then after a few days, she started having spells of tachycardia and anxiety.  She saw Dr. Perez who did an EKG and a Zio patch, which were all normal except for sinus tach.  He recommended that she be seen here for anxiety.  She came back to see Lisa again who switched her from fluoxetine and added buspirone.  Lisa did offer some Gene-Site testing, and those results come back tomorrow.          She is on Synthroid for hypothyroidism.  Recently had TSH checked and this was normal.         She is on gabapentin for chronic low back pain.   She had tapered this down, trying to get off of some meds to try to clear the riley, but after reading that gabapentin is sometimes used to treat anxiety off-label, she went back on it.  She does feel more normal again, but she also has not have any big stressors recently.  She is worried about how she would react if she did have something happen.      ROS:     CONSTITUTIONAL:  Negative for fatigue and fever.      EYES:  Negative for blurred vision.      E/N/T:  Negative for diminished hearing and " "nasal congestion.      CARDIOVASCULAR:  Negative for chest pain and palpitations.      RESPIRATORY:  Negative for recent cough and dyspnea.      GASTROINTESTINAL:  Negative for abdominal pain, constipation, diarrhea, nausea and vomiting.      MUSCULOSKELETAL:  Negative for arthralgias and myalgias.      PSYCHIATRIC:  Negative for anxiety, depression and sleep disturbance.          PMH/FMH/SH:     Last Reviewed on 9/02/2019 12:38 PM by Merle Brunson    Past Medical History:                 PAST MEDICAL HISTORY         Hypothyroidism         PREVENTIVE HEALTH MAINTENANCE             COLORECTAL CANCER SCREENING: Up to date (colonoscopy q10y; sigmoidoscopy q5y; Cologuard q3y) was last done 7/18/2019, Results are in chart; colonoscopy with normal results     MAMMOGRAM: Done within last 2 years and results in are chart was last done 6/20/2018 with normal results     PAP SMEAR: was last done s/p total hysterectomy         Surgical History:         Cholecystectomy: 2014;     Hysterectomy: 2000; uterus only;     Tubal Ligation: at age in her 20s;      L breast lumpectomy - benign;         Family History:     Father: Type 1 Diabetes     Mother: Healthy     Maternal Grandmother: \"thyroid issues and nodules\"         Social History:     Occupation:  at [employer];     Marital Status:      Children: 2 children         Tobacco/Alcohol/Supplements:     Last Reviewed on 9/16/2019 11:12 AM by Addis Wilson    Tobacco: Current Smoker: She currently smokes every day, 1 pack per day; (start age 18 pack-year history).          Substance Abuse History:     Last Reviewed on 9/02/2019 12:38 PM by Merle Brunson        Mental Health History:     Last Reviewed on 9/02/2019 12:38 PM by Merle Brunson        Communicable Diseases (eg STDs):     Last Reviewed on 9/02/2019 12:38 PM by Merle Brunson            Current Problems:     Last Reviewed on 9/02/2019 12:38 PM by Merle Brunson    Major " depression, recurrent episode, moderate     Tachycardia, NOS     Abdominal pain, unspecified     Fatigue     Use of high risk medications     Chronic low back pain     Screening for lipoid disorders     Hypothyroidism     Anxiety, NOS     Allergies         Immunizations:     Fluzone (3 + years dose) 10/1/2016     Fluzone (3 + years dose) 11/1/2017     Fluzone Quadrivalent (3+ years) 12/17/2018         Allergies:     Last Reviewed on 9/16/2019 11:11 AM by Addis Wilson    Fluoxetine: tachycardia        Current Medications:     Last Reviewed on 9/16/2019 11:11 AM by Addis Wilson    Lexapro 10mg Tablet 1 tab daily     Gabapentin 100mg Capsules Give 2 capsules by mouth 3 times daily     Ventolin HFA 90mcg/1actuation Oral Inhaler Inhale 2 puff(s) by mouth 4 times a day as needed     Synthroid 0.025mg Tablet Take 1 tablet(s) by mouth daily     Buspirone HCl 15mg Tablet 1 PO BID PRN     Flonase Allergy Relief 50mcg/1actuation Nasal Spray 1 spray each nostril bid prn     Fexofenadine HCl 180mg Tablet 1 tab daily         OBJECTIVE:        Vitals:         Current: 9/24/2019 2:52:46 PM    Ht:  5 ft, 5 in;  Wt: 153.2 lbs;  BMI: 25.5    T: 98.3 F (oral);  BP: 123/74 mm Hg (left arm, sitting);  P: 89 bpm (left arm (BP Cuff), sitting);  sCr: 0.7 mg/dL;  GFR: 102.39        Exams:     PHYSICAL EXAM:     GENERAL: vital signs recorded - well developed, well nourished;  no apparent distress;     EYES: extraocular movements intact; conjunctiva and cornea are normal; PERRLA;     RESPIRATORY: normal respiratory rate and pattern with no distress;     MUSCULOSKELETAL: normal gait; normal overall tone     PSYCHIATRIC: affect/demeanor: anxious;  normal psychomotor function; normal speech pattern;         ASSESSMENT           300.00   F41.9  Anxiety, NOS              DDx:         ORDERS:         Lab Orders:       APPTO  Appointment need  (In-House)                   PLAN:          Anxiety, NOS She has not started the Lexapro.  She tried  the buspirone once but it didn't really work, so she didn't continue to take it.  She had Gene-Site testing and that is pending, should be back tomorrow.  Will wait until receiving those results until making a decision on which med to try.  Supportive listening today.  She is going to see Radha Finley in a couple of weeks.        30 min were spent in this face to face encounter.         FOLLOW-UP: Schedule a follow-up visit in 2 months.:.  f/u anxiety with [facility] ***OK TO OPEN ACUTE ILLNESS SPOT***           Orders:       APPTO  Appointment need  (In-House)               Patient Recommendations:        For  Anxiety, NOS:     Schedule a follow-up visit in 2 months.                APPOINTMENT INFORMATION:        Monday Tuesday Wednesday Thursday Friday Saturday Sunday            Time:___________________AM  PM   Date:_____________________             CHARGE CAPTURE           **Please note: ICD descriptions below are intended for billing purposes only and may not represent clinical diagnoses**        Primary Diagnosis:         300.00 Anxiety, NOS            F41.9    Anxiety disorder, unspecified              Orders:          02473   Office/outpatient visit; established patient, level 4  (In-House)             APPTO   Appointment need  (In-House)               ADDENDUMS:      ____________________________________    Addendum: 10/09/2019 01:43 PM - Five, Team         Visit Note Faxed to:        [facility]; Number [phone]

## 2021-05-18 NOTE — PROGRESS NOTES
Gabriela Riggs. 1975     Office/Outpatient Visit    Visit Date: Thu, Jun 20, 2019 01:12 pm    Provider: Candis Carey MD (Assistant: Gina Roth RN)    Location: [facility]        Electronically signed by Candis Carey MD on  06/20/2019 01:39:08 PM                             SUBJECTIVE:        CC:     Mrs. Riggs is a 43 year old White female.  Preventative Exam;         HPI:     Gabriela is here today for annual physical.  She is not due by age for colonoscopy, but actually has this scheduled for diagnostic evaluation of recent problems with abdominal pain, diarrhea and hematochezia.  That is scheduled for July 18th (also doing an EGD).  She is due for mammogram, last done 6/2018 and this was normal.  Pap smear is no longer indicated by history; s/p hysterectomy.   She is UTD on flu shot (12/2018).  She is due for Havrix and Td.  She is due for fasting lipids and glucose.     Mrs. Riggs is here for a Medicare wellness visit.          PHQ-9 Depression Screening: Completed form scanned and in chart; Total Score 4 Alcohol Consumption Screening: Completed form scanned and in chart; Total Score 1     ROS:     CONSTITUTIONAL:  Negative for fatigue and fever.      EYES:  Negative for blurred vision.      E/N/T:  Negative for diminished hearing and nasal congestion.      CARDIOVASCULAR:  Negative for chest pain and palpitations.      RESPIRATORY:  Negative for recent cough and dyspnea.      GASTROINTESTINAL:  Positive for abdominal pain, abdominal bloating, diarrhea and hematochezia.   Negative for constipation, melena, nausea or vomiting.      GENITOURINARY:  Negative for dysuria and urinary incontinence.      MUSCULOSKELETAL:  Negative for arthralgias and myalgias.      INTEGUMENTARY/BREAST:  Negative for atypical mole(s) and rash.      NEUROLOGICAL:  Negative for paresthesias and weakness.      PSYCHIATRIC:  Negative for anxiety, depression and sleep disturbance.          PMH/FMH/SH:      "Last Reviewed on 6/20/2019 01:20 PM by Candis Carey    Past Medical History:                 PAST MEDICAL HISTORY         Hypothyroidism         PREVENTIVE HEALTH MAINTENANCE             MAMMOGRAM: Done within last 2 years and results in are chart was last done 6/20/2018 with normal results     PAP SMEAR: was last done s/p total hysterectomy         Surgical History:         Cholecystectomy: 2014;     Hysterectomy: 2000; uterus only;     Tubal Ligation: at age in her 20s;      L breast lumpectomy - benign;         Family History:     Father: Type 1 Diabetes     Mother: Healthy     Maternal Grandmother: \"thyroid issues and nodules\"         Social History:     Occupation:  at [employer];     Marital Status:      Children: 2 children         Tobacco/Alcohol/Supplements:     Last Reviewed on 6/20/2019 01:20 PM by Candis Carey    Tobacco: Current Smoker: She currently smokes every day, 1 pack per day; (start age 18 pack-year history).          Substance Abuse History:     Last Reviewed on 6/20/2019 01:20 PM by Candis Carey        Mental Health History:     Last Reviewed on 6/20/2019 01:20 PM by Candis Carey        Communicable Diseases (eg STDs):     Last Reviewed on 6/20/2019 01:20 PM by Candis Carey            Current Problems:     Last Reviewed on 6/10/2019 04:24 PM by Jose Ramon Gomez    Abdominal pain, unspecified     Fatigue     Use of high risk medications     Chronic low back pain     Screening for lipoid disorders     Hypothyroidism     Mucous in stool     Paresthesia         Immunizations:     Fluzone (3 + years dose) 10/1/2016     Fluzone (3 + years dose) 11/1/2017     Fluzone Quadrivalent (3+ years) 12/17/2018         Allergies:     Last Reviewed on 6/20/2019 01:15 PM by Gian Roth      No Known Drug Allergies.         Current Medications:     Last Reviewed on 6/20/2019 01:15 PM by Gina Roth    Synthroid 0.025mg Tablet Take 1 tablet(s) by mouth daily    "  Flonase Allergy Relief 50mcg/1actuation Nasal Spray 1 spray each nostril bid prn     Fexofenadine HCl 180mg Tablet 1 tab daily         OBJECTIVE:        Vitals:         Current: 6/20/2019 1:17:23 PM    Ht:  5 ft, 5 in;  Wt: 148.6 lbs;  BMI: 24.7    T: 97.9 F (oral);  BP: 125/66 mm Hg (right arm, sitting);  P: 64 bpm (right arm (BP Cuff), sitting);  sCr: 0.7 mg/dL;  GFR: 101.07        Exams:     PHYSICAL EXAM:     GENERAL: vital signs recorded - well developed, well nourished;  well groomed;  no apparent distress;     EYES: extraocular movements intact; conjunctiva and cornea are normal; PERRLA;     E/N/T: OROPHARYNX:  normal mucosa, dentition, gingiva, and posterior pharynx;     RESPIRATORY: normal respiratory rate and pattern with no distress; normal breath sounds with no rales, rhonchi, wheezes or rubs;     CARDIOVASCULAR: normal rate; rhythm is regular;  no systolic murmur; no edema;     GASTROINTESTINAL: moderate epigastric pain;  normal bowel sounds; no masses;     MUSCULOSKELETAL: normal gait; normal overall tone     NEUROLOGIC: mental status: alert and oriented x 3; Reflexes: brachioradialis: 2+; knee jerks: 2+;     PSYCHIATRIC: appropriate affect and demeanor; normal psychomotor function; normal speech pattern;         ASSESSMENT           V70.0   Z00.00  Health checkup              DDx:     V79.0   Z13.89  Screening for depression              DDx:     789.66   R10.816  Epigastric abdominal tenderness              DDx:         ORDERS:         Meds Prescribed:       Omeprazole 40mg Capsules, Extended Release 1 capsule daily  #30 (Thirty) capsule(s) Refills: 1         Radiology/Test Orders:       3014F  Screening mammography results documented and reviewed (PV)1  (In-House)         53466  Screening mammography  [facility]-[employer] [facility] CAD  (Send-Out)           Lab Orders:       [id number]  [facility] Glucose, serum  (Send-Out)         03186  LP - [facility] Lipid Panel  (Send-Out)         APPTO  Appointment need  (In-House)            Other Orders:         Depression screen negative  (In-House)           Negative EtOH screen  (In-House)                   PLAN:          Health checkup Gabriela is here today for annual physical.  She is not due by age for colonoscopy, but actually has this scheduled for diagnostic evaluation of recent problems with abdominal pain, diarrhea and hematochezia.  That is scheduled for July 18th (also doing an EGD).  She is due for mammogram, last done 6/2018 and this was normal; ordered.  Pap smear is no longer indicated by history; s/p hysterectomy.   She is UTD on flu shot (12/2018).  She is due for Havrix and Td; would like to defer those until she gets her GI issues straightened out.  She is due for fasting lipids and glucose; ordered.  RTC 6 months (or sooner prn pending this GI issue).     LABORATORY:  Labs ordered to be performed today include glucose [facility] lab and lipid panel.      RADIOLOGY:  I have ordered Mammogram Screening to be done today.  MIPS PHQ-9 Depression Screening: Completed form scanned and in chart; Total Score 4; Negative Depression Screen   Smoking cessation encouraged. Counseling for less than 3 minutes.  Negative alcohol screen     FOLLOW-UP: Schedule a follow-up visit in 6 months..  f/u back pain with [place]           Orders:       21710  [facility] - [facility] Glucose, serum  (Send-Out)         16551  [facility] - [facility] Lipid Panel  (Send-Out)           Depression screen negative  (In-House)           Negative EtOH screen  (In-House)         3014F  Screening mammography results documented and reviewed (PV)1  (In-House)         APPTO  Appointment need  (In-House)         09773  Screening mammography [facility] CAD  (Send-Out)            Epigastric abdominal tenderness I am going to send in some omeprazole for her to try.  She is going for EGD and colonoscopy on July 18th with Dr. Simon.           Prescriptions:       Omeprazole 40mg Capsules, Extended Release 1 capsule daily   #30 (Thirty) capsule(s) Refills: 1             Patient Recommendations:        For  Health checkup:     Schedule a follow-up visit in 6 months.                APPOINTMENT INFORMATION:        Monday Tuesday Wednesday Thursday Friday Saturday Sunday            Time:___________________AM  PM   Date:_____________________             CHARGE CAPTURE           **Please note: ICD descriptions below are intended for billing purposes only and may not represent clinical diagnoses**        Primary Diagnosis:         V70.0 Health checkup            Z00.00    Encounter for general adult medical examination without abnormal findings              Orders:          83673   Preventive medicine, established patient, age 40-64 years  (In-House)                Depression screen negative  (In-House)                Negative EtOH screen  (In-House)             3014F   Screening mammography results documented and reviewed (PV)1  (In-House)             APPTO   Appointment need  (In-House)           V79.0 Screening for depression            Z13.89    Encounter for screening for other disorder    789.66 Epigastric abdominal tenderness            R10.816    Epigastric abdominal tenderness

## 2021-05-18 NOTE — PROGRESS NOTES
Gabriela Riggs  1975     Office/Outpatient Visit    Visit Date: Thu, May 14, 2020 02:54 pm    Provider: Watson Patricia MD (Assistant: Addis Wilson MA)    Location: [facility]        Electronically signed by Watson Patricia MD on  06/11/2020 11:40:50 AM                             Subjective:        CC: Mrs. Riggs is a 44 year old White female.  Patient has complaints of sore throat, headache X 2 weeks ([employer] VIDEO CALL ); Today's encounter is being done with a telehealth visit. She has consented verbally with two witnesses for todays treatment. Todays visit is being conducted by audio and video. Individuals present during the telemedicine consultation include patient and Dr. Patricia         HPI:           Patient to be evaluated for acute upper respiratory infection, unspecified.  These have been present for the past 2 weeks.  The symptoms include headache, nasal congestion, nasal discharge, sinus pain/pressure and sore throat.  She denies body aches, chest congestion, Chills, cough, ear complaints or fever.  She denies exposure to ill contacts.  She has already tried to relieve the symptoms with antihistamines and steroid nasal spray.  Medical history is significant for allergies.      ROS:     CONSTITUTIONAL:  Negative for chills, fatigue and fever.      E/N/T:  Positive for nasal congestion, frequent rhinorrhea, sinus pressure and sore throat.   Negative for ear pain, tinnitus or hoarseness.      CARDIOVASCULAR:  Negative for chest pain, dizziness, palpitations and edema.      RESPIRATORY:  Negative for dyspnea and cough.      GASTROINTESTINAL:  Negative for abdominal pain, diarrhea, nausea and vomiting.      MUSCULOSKELETAL:  Negative for arthralgias and myalgias.      INTEGUMENTARY/BREAST:  Negative for rash, breast mass and skin changes of breast.      NEUROLOGICAL:  Positive for headaches.   Negative for weakness.          Past Medical History / Family History / Social  "History:         Last Reviewed on 6/11/2020 11:40 AM by Watson Patricia    Past Medical History:                 PAST MEDICAL HISTORY         Hypothyroidism         PREVENTIVE HEALTH MAINTENANCE             COLORECTAL CANCER SCREENING: Up to date (colonoscopy q10y; sigmoidoscopy q5y; Cologuard q3y) was last done 7/18/2019, Results are in chart; colonoscopy with normal results     MAMMOGRAM: Done within last 2 years and results in are chart was last done 1/13/2020 with normal results     PAP SMEAR: was last done s/p total hysterectomy         Surgical History:         Cholecystectomy: 2014;     Hysterectomy: 2000; uterus only;     Tubal Ligation: at age in her 20s;     L breast lumpectomy - benign;         Family History:     Father: Type 1 Diabetes     Mother: Healthy     Maternal Grandmother: \"thyroid issues and nodules\"         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          Substance Abuse History:     Last Reviewed on 6/11/2020 11:40 AM by Watson Patricia        Mental Health History:     Last Reviewed on 6/11/2020 11:40 AM by Watson Patricia        Communicable Diseases (eg STDs):     Last Reviewed on 6/11/2020 11:40 AM by Watson Patricia        Current Problems:     Last Reviewed on 6/11/2020 11:40 AM by Watson Patricia    Hypothyroidism, unspecified    Other fatigue    Low back pain    Other long term (current) drug therapy    Generalized abdominal pain    Allergic rhinitis due to pollen    Tachycardia, unspecified    Anxiety disorder, unspecified    Major depressive disorder, recurrent, moderate    Encounter for screening for lipoid disorders    Encounter for screening for depression    Acute sinusitis, unspecified        " Immunizations:     Fluzone (3 + years dose) 10/1/2016    Fluzone (3 + years dose) 11/1/2017    Fluzone Quadrivalent (3+ years) 12/17/2018        Allergies:     Last Reviewed on 6/11/2020 11:40 AM by Watson Patricia    Fluoxetine: tachycardia         Current Medications:     Last Reviewed on 6/11/2020 11:40 AM by Watson Patricia    Gabapentin 300 mg oral capsule [1 cap TID]    fexofenadine 180 mg oral tablet [1 tab daily]    Flonase Allergy Relief 50 mcg/actuation Intranasal Spray, Suspension [1 spray each nostril bid prn]    Lexapro 10 mg oral tablet [1 tab daily]        Objective:        Exams:     PHYSICAL EXAM:     GENERAL: vital signs recorded - well developed, well nourished;  no apparent distress;     EYES: conjunctiva and cornea are normal;     RESPIRATORY: normal respiratory rate and pattern with no distress;     BREAST/INTEGUMENT: No significant rashes, lesions or suspicious moles within limits of examination/encounter;     NEUROLOGIC: Grossly intact; mental status: alert and oriented x 3;     PSYCHIATRIC: appropriate affect and demeanor; normal speech pattern; Normal behavior;         Assessment:         J01.90   Acute sinusitis, unspecified           ORDERS:         Meds Prescribed:       [New Rx] amoxicillin-pot clavulanate 875-125 mg oral tablet [take 1 tablet by oral route every 12 hours], #14 (fourteen) tablets, Refills: 0 (zero)                 Plan:         Acute sinusitis, unspecified- Clinical picture consistent with acute sinusitis.  Will start Augmentin 875-125 mg twice daily x7 days.  Tylenol as needed for fever/discomfort.  Over-the-counter cough suppressant/decongestants as needed.  She can continue her current allergy regimen of Allegra 180 mg daily, Flonase daily.    Telehealth: Verbal consent obtained for visit to occur via televideo conferencing           Prescriptions:       [New Rx] amoxicillin-pot clavulanate 875-125 mg oral tablet [take 1 tablet by oral route every 12 hours], #14  (fourteen) tablets, Refills: 0 (zero)             Charge Capture:         Primary Diagnosis:     J01.90  Acute sinusitis, unspecified           Orders:      94620  Office/outpatient visit; established patient, level 3  (In-House)

## 2021-05-18 NOTE — PROGRESS NOTES
Gabriela Riggs EMMETT  1975     Office/Outpatient Visit    Visit Date: Fri, Feb 7, 2020 01:41 pm    Provider: Candis Carey MD (Assistant: Addis Wilson MA)    Location: [facility]        Electronically signed by Candis Carey MD on  02/07/2020 02:22:34 PM                             Subjective:        CC: Mrs. Riggs is a 44 year old White female.  Patient presents today for three month follow up (not taking Synthroid or Ventolin);         HPI: Gabriela is here for routine follow-up on chronic issues.        She is on Lexapro and buspirone for anxiety.  Her main source of stress is that her  has been struggling with idiopathic pulmonary fibrosis.  Her mood is better.  Her anxiety is improved.          She is on gabapentin for chronic low back pain.  This does work well to control her pain.  It also helps with her anxiety and depression.  No adverse effects.  She is UTD on controlled substance monitoring.        She is no longer taking Synthroid for hypothyroidism, and her last TSH 3 months ago was fine.  She is due for a repeat.  No heat/cold intolerance, no changes in hair or skin.    ROS:     CONSTITUTIONAL:  Negative for fatigue and fever.      EYES:  Negative for blurred vision.      E/N/T:  Negative for diminished hearing and nasal congestion.      CARDIOVASCULAR:  Negative for chest pain and palpitations.      RESPIRATORY:  Negative for recent cough and dyspnea.      GASTROINTESTINAL:  Negative for abdominal pain, constipation, diarrhea, nausea and vomiting.      MUSCULOSKELETAL:  Negative for arthralgias and myalgias.      PSYCHIATRIC:  Negative for anxiety, depression and sleep disturbance.          Past Medical History / Family History / Social History:         Last Reviewed on 2/07/2020 02:08 PM by Candis Carey    Past Medical History:                 PAST MEDICAL HISTORY         Hypothyroidism         PREVENTIVE HEALTH MAINTENANCE             COLORECTAL CANCER SCREENING:  "Up to date (colonoscopy q10y; sigmoidoscopy q5y; Cologuard q3y) was last done 7/18/2019, Results are in chart; colonoscopy with normal results     MAMMOGRAM: Done within last 2 years and results in are chart was last done 1/13/2020 with normal results     PAP SMEAR: was last done s/p total hysterectomy         Surgical History:         Cholecystectomy: 2014;     Hysterectomy: 2000; uterus only;     Tubal Ligation: at age in her 20s;     L breast lumpectomy - benign;         Family History:     Father: Type 1 Diabetes     Mother: Healthy     Maternal Grandmother: \"thyroid issues and nodules\"         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          Substance Abuse History:     Last Reviewed on 2/07/2020 02:08 PM by Candis Carey        Mental Health History:     Last Reviewed on 2/07/2020 02:08 PM by Candis Carey        Communicable Diseases (eg STDs):     Last Reviewed on 2/07/2020 02:08 PM by Candis Carey        Current Problems:     Last Reviewed on 11/14/2019 03:23 PM by Candis Carey    Hypothyroidism, unspecified    Other fatigue    Other long term (current) drug therapy    Low back pain    Generalized abdominal pain    Allergic rhinitis due to pollen    Tachycardia, unspecified    Anxiety disorder, unspecified    Major depressive disorder, recurrent, moderate        Immunizations:     Fluzone (3 + years dose) 10/1/2016    Fluzone (3 + years dose) 11/1/2017    Fluzone Quadrivalent (3+ years) 12/17/2018        Allergies:     Last Reviewed on 11/14/2019 03:23 PM by Candis Carey    Fluoxetine: tachycardia         Current Medications:     Last Reviewed on 11/14/2019 03:23 PM by Candis Carey    Synthroid " 0.025mg Tablet [Take 1 tablet(s) by mouth daily]    Gabapentin 300 mg oral capsule [1 cap TID]    fexofenadine 180 mg oral tablet [1 tab daily]    Flonase Allergy Relief 50 mcg/actuation Intranasal Spray, Suspension [1 spray each nostril bid prn]    Ventolin HFA 90mcg/1actuation Oral Inhaler [Inhale 2 puff(s) by mouth 4 times a day as needed]    Lexapro 10 mg oral tablet [1 tab daily]        Objective:        Vitals:         Current: 2/7/2020 1:45:44 PM    Ht:  5 ft, 5 in;  Wt: 161 lbs;  BMI: 26.8T: 97.8 F (oral);  BP: 116/70 mm Hg (left arm, sitting);  P: 77 bpm (left arm (BP Cuff), sitting);  sCr: 0.7 mg/dL;  GFR: 103.50        Exams:     PHYSICAL EXAM:     GENERAL: vital signs recorded - well developed, well nourished;  no apparent distress;     EYES: extraocular movements intact; conjunctiva and cornea are normal; PERRLA;     RESPIRATORY: normal respiratory rate and pattern with no distress; normal breath sounds with no rales, rhonchi, wheezes or rubs;     CARDIOVASCULAR: normal rate; rhythm is regular;  no systolic murmur; no edema;     MUSCULOSKELETAL: normal gait; normal overall tone     PSYCHIATRIC: affect/demeanor: anxious;  normal psychomotor function; normal speech pattern;         Assessment:         E03.9   Hypothyroidism, unspecified       M54.5   Low back pain       F41.9   Anxiety disorder, unspecified       F33.1   Major depressive disorder, recurrent, moderate       Z13.31   Encounter for screening for depression       Z13.220   Encounter for screening for lipoid disorders           ORDERS:         Lab Orders:       [id number]  COMP - [facility] Comp. Metabolic Panel  (Send-Out)            [id number]  TSH - [facility] TSH  (Send-Out)            APPTO  Appointment need  (In-House)            71355  DP - [facility] Lipid Panel  (Send-Out)              Other Orders:         Screening mammogram results documented  (Send-Out)              Depression screen negative  (In-House)                      Plan:          Hypothyroidism, unspecifiedDoing well.  Checking TSH to verify she is staying in range now that she is off meds.  RTC 6 months for annual physical.    LABORATORY:  Labs ordered to be performed today include Comprehensive metabolic panel and TSH.  [facility] PHQ-9 Depression Screening: Completed form scanned and in chart; Total Score 4; Negative Depression Screen  Smoking cessation encouraged. Counseling for less than 3 minutes.      FOLLOW-UP: Schedule a follow-up visit in 6 months.:.  annual physical 30 min with Aurelio          Orders:       [id number]  COMP - [facility] Comp. Metabolic Panel  (Send-Out)            34593  TSH - [facility] TSH  (Send-Out)              Screening mammogram results documented  (Send-Out)            APPTO  Appointment need  (In-House)              Depression screen negative  (In-House)              Low back painShe is stable on her current regimen.  Pain is well controlled.  No adverse effects.  She does require ongoing use of this controlled substance to function.  Prior tox screen and Julius appropriate.  No refills needed today.    Controlled substance documentation: Julius reviewed; prior drug screen consistent; consent is reviewed and signed and on the chart.  She is aware of risk of addiction on this medication, understands that she will need to follow up for a review every 3 months and her medications will be adjusted or decreased as deemed appropriate at each visit.  No history of drug or alcohol abuse.  No concerns about diversion or abuse. She denies side effects related to the medication.  She is aware that she may be called in for pill counts.  The dosing of this medication will be reviewed on a regular basis and reduced if possible..  Ongoing use of a controlled substance is necessary for this patient to have a normal quality of life         Anxiety disorder, unspecifiedDoing  much better on Lexapro.  She does have buspirone she can use prn if she has a bad day with breakthrough anxiety.   No refills needed today.        Major depressive disorder, recurrent, moderateAs  above.        Encounter for screening for lipoid disorders    LABORATORY:  Labs ordered to be performed today include lipid panel.            Orders:       [id number]  [facility] Lipid Panel  (Send-Out)                  Patient Recommendations:        For  Hypothyroidism, unspecified:    Schedule a follow-up visit in 6 months.                APPOINTMENT INFORMATION:        Monday Tuesday Wednesday Thursday Friday Saturday Sunday            Time:___________________AM  PM   Date:_____________________             Charge Capture:         Primary Diagnosis:     E03.9  Hypothyroidism, unspecified           Orders:      39305  Office/outpatient visit; established patient, level 4  (In-House)            APPTO  Appointment need  (In-House)              Depression screen negative  (In-House)              M54.5  Low back pain     F41.9  Anxiety disorder, unspecified     F33.1  Major depressive disorder, recurrent, moderate     Z13.31  Encounter for screening for depression     Z13.220  Encounter for screening for lipoid disorders

## 2021-05-18 NOTE — PROGRESS NOTES
Gabriela Riggs 1975     Office/Outpatient Visit    Visit Date: Tue, Apr 23, 2019 01:59 pm    Provider: Kaylie Jarrell N.P. (Assistant: Fina Miller MA)    Location: [facility]        Electronically signed by Kaylie Jarrell N.P. on  04/24/2019 01:47:51 PM                             SUBJECTIVE:        CC:     Ms. Riggs is a 43 year old White female.  This is a follow-up visit.  Went to [facility] on 4/17/19, she states she went in abd cramping, tingling in hands. She states she does feel some better but she is still having those same symptoms;         HPI:         Patient complains of abdominal pain, unspecified.  has had 3 episodes in the last one week where she feels like a vice if gripping her around her ribcage and epigastric area.  brief numbness from fingers and up to elbow.  once had brief tingling of both thighs.  third episode prompted her to go to [facility].  ekg and cardiac work up with cardiac enzymes negative.  hgb 11.8, hct 36.1,  wbc 13.62.  normal BNP and cmp. pancreatic enzymes normal.  ct of abd with contrast showed bladder wall thickening and possible left ovary cyst.  ct angiogram negative ,  cxr negative.  dx with UTI and sent home on 5 day course of bactrim which she completed this am.  just does not feel much better but declines any further episodes.  did have mucoid stool this am that reminds her of previous c. diff infection.      see HPI above.     ROS:     CONSTITUTIONAL:  Negative for chills, fatigue, fever, and weight change.      E/N/T:  Negative for hearing problems, E/N/T pain, congestion, rhinorrhea, epistaxis, hoarseness, and dental problems.      CARDIOVASCULAR:  Negative for chest pain, palpitations, tachycardia, orthopnea, and edema.      RESPIRATORY:  Negative for cough, dyspnea, and hemoptysis.      GASTROINTESTINAL:  Positive for abdominal pain ( epigastric ) and mucoid stool x 1.   Negative for acid reflux symptoms, dysphagia, constipation,  "diarrhea, heartburn, hematemesis, hematochezia, nausea or vomiting.      GENITOURINARY:  Negative for genital lesions, hematuria, menstrual problems, polyuria, abnormal vaginal bleeding, and vaginal discharge.      MUSCULOSKELETAL:  Positive for back pain.      NEUROLOGICAL:  Positive for paresthesias.   Negative for fainting, headaches, vertigo or weakness.      PSYCHIATRIC:  Positive for feelings of stress.   Negative for anxiety, depression or sleep disturbance.          PMH/FMH/SH:     Last Reviewed on 12/17/2018 01:05 PM by Candis Carey    Past Medical History:                 PAST MEDICAL HISTORY         Hypothyroidism         PREVENTIVE HEALTH MAINTENANCE             MAMMOGRAM: Done within last 2 years and results in are chart was last done 6/20/2018 with normal results     PAP SMEAR: was last done s/p total hysterectomy         Surgical History:         Cholecystectomy: 2014;     Hysterectomy: 2000; uterus only;     Tubal Ligation: at age in her 20s;      L breast lumpectomy - benign;         Family History:     Father: Type 1 Diabetes     Mother: Healthy     Maternal Grandmother: \"thyroid issues and nodules\"         Social History:     Occupation:  at [employer];     Marital Status:      Children: 2 children         Tobacco/Alcohol/Supplements:     Last Reviewed on 12/17/2018 01:05 PM by Candis Carey    Tobacco: Current Smoker: She currently smokes every day, 1 pack per day; (start age 18 pack-year history).          Substance Abuse History:     Last Reviewed on 12/17/2018 01:05 PM by Candis Carey        Mental Health History:     Last Reviewed on 12/17/2018 01:05 PM by Candis Carey        Communicable Diseases (eg STDs):     Last Reviewed on 12/17/2018 01:05 PM by Candis Carey            Current Problems:     Last Reviewed on 12/17/2018 01:05 PM by Candis Carey    Fatigue     Use of high risk medications     Chronic low back pain     Screening for lipoid disorders "     Hypothyroidism         Immunizations:     Fluzone (3 + years dose) 10/1/2016     Fluzone (3 + years dose) 11/1/2017     Fluzone Quadrivalent (3+ years) 12/17/2018         Allergies:     Last Reviewed on 4/23/2019 02:05 PM by Fina Miller      No Known Drug Allergies.         Current Medications:     Last Reviewed on 4/23/2019 02:05 PM by Fina Miller    Synthroid 0.025mg Tablet Take 1 tablet(s) by mouth daily     Gabapentin 100mg Capsules Give 2 capsules by mouth 3 times daily     Flonase Allergy Relief 50mcg/1actuation Nasal Spray 1 spray each nostril bid prn     Fexofenadine HCl 180mg Tablet 1 tab daily         OBJECTIVE:        Vitals:         Historical:     12/17/2018  BP:   117/78 mm Hg ( (left arm, , sitting, );)     12/17/2018  Wt:   150.6lbs        Current: 4/23/2019 2:08:07 PM    Ht:  5 ft, 5 in;  Wt: 147 lbs;  BMI: 24.5    T: 97.4 F (oral);  BP: 117/62 mm Hg (right arm, sitting);  P: 93 bpm (right arm (BP Cuff), sitting);  sCr: 0.78 mg/dL;  GFR: 90.29        Exams:     PHYSICAL EXAM:     GENERAL:  well developed and nourished; appropriately groomed; in no apparent distress;     RESPIRATORY: normal respiratory rate and pattern with no distress; normal breath sounds with no rales, rhonchi, wheezes or rubs;     CARDIOVASCULAR: normal rate; rhythm is regular;     Peripheral Pulses: radial: 2+ amplitude, no bruits; posterior tibial: 2+ amplitude, no bruits; no edema;     GASTROINTESTINAL: mild diffuse tenderness;  no guarding;  no rebound tenderness;  normal bowel sounds; no organomegaly;     MUSCULOSKELETAL:  Normal range of motion, strength and tone;     NEUROLOGIC: mental status: alert and oriented x 3; GROSSLY INTACT     PSYCHIATRIC:  appropriate affect and demeanor; normal speech pattern; grossly normal memory;         ASSESSMENT           789.00   R10.84  Abdominal pain, unspecified              DDx:     782.0   R20.2  Paresthesia              DDx:     792.1   R19.5  Mucous in stool               DDx:         ORDERS:         Lab Orders:       [id number]  TSH - [facility] TSH  (Send-Out)         86358  VB12 - [facility] Vitamin B12  (Send-Out)         75933  BDUAM - [facility] Urinalysis, automated, with micro  (Send-Out)         56589  HPUBT - [facility] H.pylori Breath test  (Send-Out)         75237  STLC - [facility] Stool Culture  (Send-Out)         25790  CDTEX - [facility] Clostridium Difficile Toxins A & B; dna probe  (Send-Out)                   PLAN:          Abdominal pain, unspecified     LABORATORY:  Labs ordered to be performed today include H.pylori urea breath test ([facility]) and urinalysis with micro.      RECOMMENDATIONS given include: back to ER if worsens.  review ER record, imaging, and labs..            Orders:       [id number]  BDUAM - [facility] Urinalysis, automated, with micro  (Send-Out)         51904  HPUBT - [facility] H.pylori Breath test  (Send-Out)            Paresthesia     LABORATORY:  Labs ordered to be performed today include TSH.            Orders:       [id number]  TSH - [facility] TSH  (Send-Out)         91080  VB12 - [facility] Vitamin B12  (Send-Out)            Mucous in stool     LABORATORY:  Labs ordered to be performed today include stool culture.            Orders:       [id number]  STLC - [facility] Stool Culture  (Send-Out)         45366  CDTEX - [facility] Clostridium Difficile Toxins A & B; dna probe  (Send-Out)               CHARGE CAPTURE           **Please note: ICD descriptions below are intended for billing purposes only and may not represent clinical diagnoses**        Primary Diagnosis:         789.00 Abdominal pain, unspecified            R10.84    Generalized abdominal pain              Orders:          [id number]   Office/outpatient visit; established patient, level 3  (In-House)           782.0 Paresthesia            R20.2    Paresthesia of skin    792.1 Mucous in stool            R19.5    Other fecal abnormalities

## 2021-05-18 NOTE — PROGRESS NOTES
Gabriela RiggsLili 1975     Office/Outpatient Visit    Visit Date: Wed, Apr 4, 2018 02:57 pm    Provider: Candis Carey MD (Assistant: Gina Roth RN)    Location: [facility]        Electronically signed by Candis Carey MD on  04/04/2018 03:39:07 PM                             SUBJECTIVE:        CC:     Ms. Riggs is a 42 year old White female.  Medication Refill; hypothyroidism         HPI: Gabriela is here for yearly follow-up on her hypothyroidism.  She has been quite stable.  She is on Synthroid currently.  She is due for labs.  No heat or cold intolerance, no dry skin or changes in hair or nails, no diarrhea or constipation, no CP or palpitations.  She has had some fatigue for the past 3-4 months.  SHe does take vitamin D somewhat sporadically.     ROS:     CONSTITUTIONAL:  Positive for fatigue and night sweats.   Negative for fever.      EYES:  Negative for blurred vision.      E/N/T:  Negative for diminished hearing and nasal congestion.      CARDIOVASCULAR:  Positive for pedal edema ( baseline lymphedema in the LLE (leg was slammed accidentally in the car door when she was a child) ).   Negative for chest pain or palpitations.      RESPIRATORY:  Negative for recent cough and dyspnea.      GASTROINTESTINAL:  Negative for abdominal pain, constipation, diarrhea, nausea and vomiting.      MUSCULOSKELETAL:  Negative for arthralgias and myalgias.      NEUROLOGICAL:  Negative for paresthesias and weakness.          PMH/FMH/SH:     Last Reviewed on 4/04/2018 03:23 PM by Candis Carey    Past Medical History:                 PAST MEDICAL HISTORY         Hypothyroidism         PREVENTIVE HEALTH MAINTENANCE             MAMMOGRAM: was last done 6/2017 with normal results     PAP SMEAR: was last done s/p total hysterectomy         Surgical History:         Cholecystectomy: 2014;     Hysterectomy: 2000; uterus only;     Tubal Ligation: at age in her 20s;      L breast lumpectomy - benign;      "    Family History:     Father: Type 1 Diabetes     Mother: Healthy     Maternal Grandmother: \"thyroid issues and nodules\"         Social History:     Occupation:  at [employer];     Marital Status:      Children: 2 children         Tobacco/Alcohol/Supplements:     Last Reviewed on 4/04/2018 03:23 PM by Candis Carey    Tobacco: Current Smoker: She currently smokes every day, 1 pack per day; (start age 18 pack-year history).          Substance Abuse History:     Last Reviewed on 4/04/2018 03:23 PM by Candis Carey        Mental Health History:     Last Reviewed on 4/04/2018 03:23 PM by Candis Carey        Communicable Diseases (eg STDs):     Last Reviewed on 4/04/2018 03:23 PM by Candis Carey            Current Problems:     Last Reviewed on 12/29/2017 03:55 PM by Merle Brunson    Hypothyroidism     UTI         Immunizations:     Fluzone (3 + years dose) 10/1/2016         Allergies:     Last Reviewed on 4/04/2018 03:01 PM by Gina Roth      No Known Drug Allergies.         Current Medications:     Last Reviewed on 4/04/2018 03:03 PM by Gina Roth    Synthroid 0.025mg Tablet Take 1 tablet(s) by mouth daily     Fexofenadine HCl 180mg Tablet 1 tab daily     Sudafed 12 Hour 120mg Tablets, Extended Release Take 1 tablet(s) by mouth  daily         OBJECTIVE:        Vitals:         Current: 4/4/2018 3:01:12 PM    Ht:  5 ft, 5 in;  Wt: 156.5 lbs;  BMI: 26.0    T: 97 F (oral);  BP: 129/76 mm Hg (left arm, sitting);  P: 90 bpm (left arm (BP Cuff), sitting)        Exams:     PHYSICAL EXAM:     GENERAL: vital signs recorded - well developed, well nourished;  well groomed;  no apparent distress;     EYES: extraocular movements intact; conjunctiva and cornea are normal; PERRLA;     E/N/T: OROPHARYNX:  normal mucosa, dentition, gingiva, and posterior pharynx;     NECK: thyroid exam reveals mild fullness of the L thyroid lobe, nontender;     RESPIRATORY: normal respiratory rate and " pattern with no distress; normal breath sounds with no rales, rhonchi, wheezes or rubs;     CARDIOVASCULAR: normal rate; rhythm is regular;  no systolic murmur; no edema;     GASTROINTESTINAL: nontender; normal bowel sounds;     MUSCULOSKELETAL: normal gait; normal overall tone         ASSESSMENT           244.9   E03.9  Hypothyroidism              DDx:     V77.91   Z13.220  Screening for lipoid disorders              DDx:     780.79   R53.83  Fatigue              DDx:         ORDERS:         Meds Prescribed:       Refill of: Synthroid (Levothyroxine Sodium) 0.025mg Tablet Take 1 tablet(s) by mouth daily  #90 (Ninety) tablet(s) Refills: 3         Radiology/Test Orders:       32802  US, soft tissues of head & neck (eg, thyroid, parathyroid, parotid), real time w/image documentation  (Send-Out)           Lab Orders:       [id number]  TSH - [facility] TSH  (Send-Out)         53887  COMP - [facility] Comp. Metabolic Panel  (Send-Out)         64189  LPDP - [facility] Lipid Panel  (Send-Out)         60682  VB12 - H Vitamin B12  (Send-Out)         21388  BDCBC [facility] CBC with 3 part diff  (Send-Out)         56089  VITD - [facility] Vitamin D, 25 Hydroxy  (Send-Out)                   PLAN:          Hypothyroidism Due for labs, ordered today as below.  She does have a little fullness in the thyroid which she has noticed over the past few months, so will get a thyroid U/S to evaluate that further.  Refills sent on meds.  RTC 1 year.     LABORATORY:  Labs ordered to be performed today include Comprehensive metabolic panel and TSH.      RADIOLOGY:  I have ordered Thyroid Ultrasound to be done today.      FOLLOW-UP: in 1 year.            Prescriptions:       Refill of: Synthroid (Levothyroxine Sodium) 0.025mg Tablet Take 1 tablet(s) by mouth daily  #90 (Ninety) tablet(s) Refills: 3           Orders:       [id number]  TSH - [facility] TSH  (Send-Out)         27597  COMP - [facility] Comp. Metabolic Panel  (Send-Out)         30257  US, soft tissues of head & neck (eg, thyroid,  parathyroid, parotid), real time w/image documentation  (Send-Out)             Patient Education Handouts:       [facility] Medication Compliance           Screening for lipoid disorders     LABORATORY:  Labs ordered to be performed today include lipid panel.            Orders:       [id number]  LPDP - [facility] Lipid Panel  (Send-Out)            Fatigue Checking other fatigue labs.  Gabriela complains of increased fatigue for the past 3-4 months.     LABORATORY:  Labs ordered to be performed today include B12, CBC, and Vitamin D.            Orders:       [id number]  VB12 - [facility] Vitamin B12  (Send-Out)         98244  BDCBC - [facility] CBC with 3 part diff  (Send-Out)         11396  VITD - [facility] Vitamin D, 25 Hydroxy  (Send-Out)               Patient Recommendations:        For  Hypothyroidism:                     APPOINTMENT INFORMATION:        Monday Tuesday Wednesday Thursday Friday Saturday Sunday            Time:___________________AM  PM   Date:_____________________             CHARGE CAPTURE           **Please note: ICD descriptions below are intended for billing purposes only and may not represent clinical diagnoses**        Primary Diagnosis:         244.9 Hypothyroidism            E03.9    Hypothyroidism, unspecified              Orders:          26532   Office/outpatient visit; established patient, level 4  (In-House)           V77.91 Screening for lipoid disorders            Z13.220    Encounter for screening for lipoid disorders    780.79 Fatigue            R53.83    Other fatigue

## 2021-06-11 ENCOUNTER — TRANSCRIBE ORDERS (OUTPATIENT)
Dept: ADMINISTRATIVE | Facility: HOSPITAL | Age: 46
End: 2021-06-11

## 2021-06-11 DIAGNOSIS — Z12.31 BREAST CANCER SCREENING BY MAMMOGRAM: Primary | ICD-10-CM

## 2021-06-15 ENCOUNTER — HOSPITAL ENCOUNTER (OUTPATIENT)
Dept: MAMMOGRAPHY | Facility: HOSPITAL | Age: 46
Discharge: HOME | End: 2021-06-15
Admitting: FAMILY MEDICINE

## 2021-06-15 DIAGNOSIS — Z12.31 BREAST CANCER SCREENING BY MAMMOGRAM: ICD-10-CM

## 2021-06-15 PROCEDURE — 77067 SCR MAMMO BI INCL CAD: CPT

## 2021-06-15 PROCEDURE — 77063 BREAST TOMOSYNTHESIS BI: CPT

## 2021-06-16 PROBLEM — R00.2 PALPITATIONS: Status: ACTIVE | Noted: 2017-05-17

## 2021-07-01 VITALS
TEMPERATURE: 98.3 F | WEIGHT: 153.2 LBS | HEART RATE: 89 BPM | DIASTOLIC BLOOD PRESSURE: 74 MMHG | BODY MASS INDEX: 25.52 KG/M2 | HEIGHT: 65 IN | SYSTOLIC BLOOD PRESSURE: 123 MMHG

## 2021-07-01 VITALS
HEART RATE: 96 BPM | TEMPERATURE: 97.6 F | BODY MASS INDEX: 26.36 KG/M2 | WEIGHT: 158.2 LBS | HEIGHT: 65 IN | SYSTOLIC BLOOD PRESSURE: 115 MMHG | DIASTOLIC BLOOD PRESSURE: 71 MMHG

## 2021-07-01 VITALS
TEMPERATURE: 97.3 F | DIASTOLIC BLOOD PRESSURE: 75 MMHG | BODY MASS INDEX: 26.46 KG/M2 | SYSTOLIC BLOOD PRESSURE: 121 MMHG | WEIGHT: 158.8 LBS | HEART RATE: 76 BPM | HEIGHT: 65 IN

## 2021-07-01 VITALS
HEIGHT: 65 IN | WEIGHT: 150.6 LBS | DIASTOLIC BLOOD PRESSURE: 78 MMHG | HEART RATE: 86 BPM | SYSTOLIC BLOOD PRESSURE: 117 MMHG | BODY MASS INDEX: 25.09 KG/M2 | TEMPERATURE: 97.6 F

## 2021-07-01 VITALS
WEIGHT: 147 LBS | BODY MASS INDEX: 24.49 KG/M2 | HEART RATE: 93 BPM | TEMPERATURE: 97.4 F | DIASTOLIC BLOOD PRESSURE: 62 MMHG | HEIGHT: 65 IN | SYSTOLIC BLOOD PRESSURE: 117 MMHG

## 2021-07-01 VITALS
HEART RATE: 72 BPM | TEMPERATURE: 98.2 F | BODY MASS INDEX: 25.56 KG/M2 | HEIGHT: 65 IN | SYSTOLIC BLOOD PRESSURE: 128 MMHG | WEIGHT: 153.4 LBS | DIASTOLIC BLOOD PRESSURE: 73 MMHG

## 2021-07-01 VITALS
DIASTOLIC BLOOD PRESSURE: 73 MMHG | HEART RATE: 90 BPM | HEIGHT: 65 IN | BODY MASS INDEX: 24.79 KG/M2 | WEIGHT: 148.8 LBS | SYSTOLIC BLOOD PRESSURE: 125 MMHG | TEMPERATURE: 98.1 F

## 2021-07-01 VITALS
WEIGHT: 149.4 LBS | DIASTOLIC BLOOD PRESSURE: 73 MMHG | HEART RATE: 73 BPM | HEIGHT: 65 IN | BODY MASS INDEX: 24.89 KG/M2 | SYSTOLIC BLOOD PRESSURE: 134 MMHG | TEMPERATURE: 98.2 F

## 2021-07-01 VITALS
DIASTOLIC BLOOD PRESSURE: 66 MMHG | SYSTOLIC BLOOD PRESSURE: 125 MMHG | HEART RATE: 64 BPM | TEMPERATURE: 97.9 F | HEIGHT: 65 IN | WEIGHT: 148.6 LBS | BODY MASS INDEX: 24.76 KG/M2

## 2021-07-01 VITALS
HEIGHT: 65 IN | DIASTOLIC BLOOD PRESSURE: 76 MMHG | TEMPERATURE: 97 F | HEART RATE: 90 BPM | BODY MASS INDEX: 26.08 KG/M2 | WEIGHT: 156.5 LBS | SYSTOLIC BLOOD PRESSURE: 129 MMHG

## 2021-07-01 VITALS
HEART RATE: 83 BPM | SYSTOLIC BLOOD PRESSURE: 115 MMHG | DIASTOLIC BLOOD PRESSURE: 72 MMHG | BODY MASS INDEX: 24.96 KG/M2 | TEMPERATURE: 97.7 F | HEIGHT: 65 IN | WEIGHT: 149.8 LBS

## 2021-07-01 VITALS
TEMPERATURE: 97.3 F | BODY MASS INDEX: 25.26 KG/M2 | SYSTOLIC BLOOD PRESSURE: 110 MMHG | WEIGHT: 151.6 LBS | HEIGHT: 65 IN | HEART RATE: 82 BPM | DIASTOLIC BLOOD PRESSURE: 72 MMHG

## 2021-07-01 VITALS
BODY MASS INDEX: 24.86 KG/M2 | WEIGHT: 149.2 LBS | HEIGHT: 65 IN | DIASTOLIC BLOOD PRESSURE: 79 MMHG | TEMPERATURE: 97.7 F | SYSTOLIC BLOOD PRESSURE: 114 MMHG | HEART RATE: 75 BPM

## 2021-07-02 VITALS
DIASTOLIC BLOOD PRESSURE: 71 MMHG | SYSTOLIC BLOOD PRESSURE: 128 MMHG | BODY MASS INDEX: 27.82 KG/M2 | TEMPERATURE: 97 F | WEIGHT: 167 LBS | HEIGHT: 65 IN | HEART RATE: 71 BPM

## 2021-07-02 VITALS
BODY MASS INDEX: 26.56 KG/M2 | WEIGHT: 159.4 LBS | DIASTOLIC BLOOD PRESSURE: 75 MMHG | HEART RATE: 75 BPM | HEIGHT: 65 IN | TEMPERATURE: 98.1 F | SYSTOLIC BLOOD PRESSURE: 107 MMHG

## 2021-07-02 VITALS
BODY MASS INDEX: 27.11 KG/M2 | TEMPERATURE: 97.3 F | HEIGHT: 65 IN | WEIGHT: 162.7 LBS | HEART RATE: 82 BPM | SYSTOLIC BLOOD PRESSURE: 110 MMHG | DIASTOLIC BLOOD PRESSURE: 72 MMHG

## 2021-07-02 VITALS
HEIGHT: 65 IN | BODY MASS INDEX: 26.82 KG/M2 | TEMPERATURE: 97.8 F | HEART RATE: 77 BPM | DIASTOLIC BLOOD PRESSURE: 70 MMHG | SYSTOLIC BLOOD PRESSURE: 116 MMHG | WEIGHT: 161 LBS

## 2021-07-02 VITALS
WEIGHT: 162.4 LBS | SYSTOLIC BLOOD PRESSURE: 125 MMHG | HEART RATE: 76 BPM | BODY MASS INDEX: 27.06 KG/M2 | HEIGHT: 65 IN | DIASTOLIC BLOOD PRESSURE: 78 MMHG | TEMPERATURE: 97.5 F

## 2021-07-02 VITALS
WEIGHT: 156.8 LBS | BODY MASS INDEX: 26.12 KG/M2 | HEIGHT: 65 IN | SYSTOLIC BLOOD PRESSURE: 110 MMHG | TEMPERATURE: 98 F | HEART RATE: 77 BPM | DIASTOLIC BLOOD PRESSURE: 69 MMHG

## 2021-08-24 RX ORDER — ESCITALOPRAM OXALATE 10 MG/1
TABLET ORAL
Qty: 90 TABLET | Refills: 1 | Status: SHIPPED | OUTPATIENT
Start: 2021-08-24 | End: 2022-01-04 | Stop reason: SDUPTHER

## 2021-08-31 DIAGNOSIS — M54.41 CHRONIC BILATERAL LOW BACK PAIN WITH BILATERAL SCIATICA: Primary | ICD-10-CM

## 2021-08-31 DIAGNOSIS — G89.29 CHRONIC BILATERAL LOW BACK PAIN WITH BILATERAL SCIATICA: Primary | ICD-10-CM

## 2021-08-31 DIAGNOSIS — M54.42 CHRONIC BILATERAL LOW BACK PAIN WITH BILATERAL SCIATICA: Primary | ICD-10-CM

## 2021-09-01 RX ORDER — GABAPENTIN 100 MG/1
100 CAPSULE ORAL 2 TIMES DAILY
Qty: 60 CAPSULE | Refills: 1 | Status: SHIPPED | OUTPATIENT
Start: 2021-09-01 | End: 2022-01-04 | Stop reason: SDUPTHER

## 2021-10-18 ENCOUNTER — OFFICE VISIT (OUTPATIENT)
Dept: FAMILY MEDICINE CLINIC | Age: 46
End: 2021-10-18

## 2021-10-18 VITALS
SYSTOLIC BLOOD PRESSURE: 124 MMHG | WEIGHT: 173 LBS | BODY MASS INDEX: 28.82 KG/M2 | HEART RATE: 74 BPM | HEIGHT: 65 IN | TEMPERATURE: 98 F | OXYGEN SATURATION: 100 % | DIASTOLIC BLOOD PRESSURE: 69 MMHG

## 2021-10-18 DIAGNOSIS — R30.0 BURNING WITH URINATION: ICD-10-CM

## 2021-10-18 DIAGNOSIS — F17.210 CIGARETTE NICOTINE DEPENDENCE WITHOUT COMPLICATION: ICD-10-CM

## 2021-10-18 DIAGNOSIS — R35.0 URINARY FREQUENCY: Primary | ICD-10-CM

## 2021-10-18 DIAGNOSIS — Z72.0 TOBACCO USE: ICD-10-CM

## 2021-10-18 DIAGNOSIS — N30.01 ACUTE CYSTITIS WITH HEMATURIA: ICD-10-CM

## 2021-10-18 LAB
BILIRUB BLD-MCNC: NEGATIVE MG/DL
CLARITY, POC: CLEAR
COLOR UR: ABNORMAL
EXPIRATION DATE: ABNORMAL
GLUCOSE UR STRIP-MCNC: NEGATIVE MG/DL
KETONES UR QL: NEGATIVE
LEUKOCYTE EST, POC: NEGATIVE
Lab: ABNORMAL
NITRITE UR-MCNC: POSITIVE MG/ML
PH UR: 6 [PH] (ref 5–8)
PROT UR STRIP-MCNC: ABNORMAL MG/DL
RBC # UR STRIP: ABNORMAL /UL
SP GR UR: 1.03 (ref 1–1.03)
UROBILINOGEN UR QL: NORMAL

## 2021-10-18 PROCEDURE — 87086 URINE CULTURE/COLONY COUNT: CPT | Performed by: NURSE PRACTITIONER

## 2021-10-18 PROCEDURE — 99213 OFFICE O/P EST LOW 20 MIN: CPT | Performed by: NURSE PRACTITIONER

## 2021-10-18 PROCEDURE — 81003 URINALYSIS AUTO W/O SCOPE: CPT | Performed by: NURSE PRACTITIONER

## 2021-10-18 RX ORDER — NITROFURANTOIN 25; 75 MG/1; MG/1
100 CAPSULE ORAL 2 TIMES DAILY
Qty: 20 CAPSULE | Refills: 0 | Status: SHIPPED | OUTPATIENT
Start: 2021-10-18 | End: 2022-01-04

## 2021-10-18 NOTE — PROGRESS NOTES
"Chief Complaint  Urinary Frequency (patient took azo last night, patient complains of possible UTI with urinary frequency and burning )    Subjective    Patient is a 45 year old female in today for urinary frequency and burning that began yesterday. Patient reports frequent UTIs.         Gabriela Riggs presents to [facility]  Urinary Frequency   This is a new problem. The current episode started yesterday. The problem occurs every urination. The problem has been unchanged. The quality of the pain is described as burning. The pain is at a severity of 4/10. The pain is mild. There has been no fever. She is sexually active. There is no history of pyelonephritis. Associated symptoms include frequency, hematuria and urgency. Pertinent negatives include no chills or nausea. She has tried home medications and increased fluids for the symptoms. The treatment provided mild relief. Her past medical history is significant for recurrent UTIs.       Objective   Vital Signs:   /69 (BP Location: Left arm, Patient Position: Sitting, Cuff Size: Adult)   Pulse 74   Temp 98 °F (36.7 °C) (Oral)   Ht 165.1 cm (65\")   Wt 78.5 kg (173 lb)   SpO2 100%   BMI 28.79 kg/m²     Physical Exam  HENT:      Head: Normocephalic.   Cardiovascular:      Rate and Rhythm: Normal rate and regular rhythm.      Pulses: Normal pulses.      Heart sounds: Normal heart sounds.   Pulmonary:      Effort: Pulmonary effort is normal.      Breath sounds: Normal breath sounds.   Skin:     General: Skin is warm and dry.   Neurological:      Mental Status: She is alert and oriented to person, place, and time.   Psychiatric:         Mood and Affect: Mood normal.        Result Review :                 Assessment and Plan    Diagnoses and all orders for this visit:    1. Urinary frequency (Primary)  -     POCT urinalysis dipstick, automated  -     Urine Culture - Urine, Urine, Clean Catch; Future    2. Burning with " urination  -     POCT urinalysis dipstick, automated  -     Urine Culture - Urine, Urine, Clean Catch; Future    3. Acute cystitis with hematuria  -     nitrofurantoin, macrocrystal-monohydrate, (Macrobid) 100 MG capsule; Take 1 capsule by mouth 2 (Two) Times a Day.  Dispense: 20 capsule; Refill: 0    4. Tobacco use  Comments:  Patient is ready to quit. Has Wellbutrin per Dr. Carey and is ready to start using. Also has nicotine gum, education provided.    5. Cigarette nicotine dependence without complication        Follow Up   Return if symptoms worsen or fail to improve.  Patient was given instructions and counseling regarding her condition or for health maintenance advice. Please see specific information pulled into the AVS if appropriate.

## 2021-10-18 NOTE — PATIENT INSTRUCTIONS
IF YOU SMOKE OR USE TOBACCO PLEASE READ THE FOLLOWING:  Why is smoking bad for me?  Smoking increases the risk of heart disease, lung disease, vascular disease, stroke, and cancer. If you smoke, STOP!    For more information:  Quit Now [place]  1-800-QUIT-NOW  https://herony.quitlogix.org/en-US/    Steps to Quit Smoking  Smoking tobacco is the leading cause of preventable death. It can affect almost every organ in the body. Smoking puts you and those around you at risk for developing many serious chronic diseases. Quitting smoking can be difficult, but it is one of the best things that you can do for your health. It is never too late to quit.  How do I get ready to quit?  When you decide to quit smoking, create a plan to help you succeed. Before you quit:  · Pick a date to quit. Set a date within the next 2 weeks to give you time to prepare.  · Write down the reasons why you are quitting. Keep this list in places where you will see it often.  · Tell your family, friends, and co-workers that you are quitting. Support from your loved ones can make quitting easier.  · Talk with your health care provider about your options for quitting smoking.  · Find out what treatment options are covered by your health insurance.  · Identify people, places, things, and activities that make you want to smoke (triggers). Avoid them.  What first steps can I take to quit smoking?  · Throw away all cigarettes at home, at work, and in your car.  · Throw away smoking accessories, such as ashtrays and lighters.  · Clean your car. Make sure to empty the ashtray.  · Clean your home, including curtains and carpets.  What strategies can I use to quit smoking?  Talk with your health care provider about combining strategies, such as taking medicines while you are also receiving in-person counseling. Using these two strategies together makes you more likely to succeed in quitting than if you used either strategy on its own.  · If you are  pregnant or breastfeeding, talk with your health care provider about finding counseling or other support strategies to quit smoking. Do not take medicine to help you quit smoking unless your health care provider tells you to do so.  To quit smoking:  Quit right away  · Quit smoking completely, instead of gradually reducing how much you smoke over a period of time. Research shows that stopping smoking right away is more successful than gradually quitting.  · Attend in-person counseling to help you build problem-solving skills. You are more likely to succeed in quitting if you attend counseling sessions regularly. Even short sessions of 10 minutes can be effective.  Take medicine  You may take medicines to help you quit smoking. Some medicines require a prescription and some you can purchase over-the-counter. Medicines may have nicotine in them to replace the nicotine in cigarettes. Medicines may:  · Help to stop cravings.  · Help to relieve withdrawal symptoms.  Your health care provider may recommend:  · Nicotine patches, gum, or lozenges.  · Nicotine inhalers or sprays.  · Non-nicotine medicine that is taken by mouth.  Find resources  Find resources and support systems that can help you to quit smoking and remain smoke-free after you quit. These resources are most helpful when you use them often. They include:  · Online chats with a counselor.  · Telephone quitlines.  · Printed self-help materials.  · Support groups or group counseling.  · Text messaging programs.  · Mobile phone apps or applications. Use apps that can help you stick to your quit plan by providing reminders, tips, and encouragement. There are many free apps for mobile devices as well as websites. Examples include Quit Guide from the CDC and smokefree.gov  What things can I do to make it easier to quit?    · Reach out to your family and friends for support and encouragement. Call telephone quitlines (2-800-QUIT-NOW), reach out to support groups, or  work with a counselor for support.  · Ask people who smoke to avoid smoking around you.  · Avoid places that trigger you to smoke, such as bars, parties, or smoke-break areas at work.  · Spend time with people who do not smoke.  · Lessen the stress in your life. Stress can be a smoking trigger for some people. To lessen stress, try:  ? Exercising regularly.  ? Doing deep-breathing exercises.  ? Doing yoga.  ? Meditating.  ? Performing a body scan. This involves closing your eyes, scanning your body from head to toe, and noticing which parts of your body are particularly tense. Try to relax the muscles in those areas.  How will I feel when I quit smoking?  Day 1 to 3 weeks  Within the first 24 hours of quitting smoking, you may start to feel withdrawal symptoms. These symptoms are usually most noticeable 2-3 days after quitting, but they usually do not last for more than 2-3 weeks. You may experience these symptoms:  · Mood swings.  · Restlessness, anxiety, or irritability.  · Trouble concentrating.  · Dizziness.  · Strong cravings for sugary foods and nicotine.  · Mild weight gain.  · Constipation.  · Nausea.  · Coughing or a sore throat.  · Changes in how the medicines that you take for unrelated issues work in your body.  · Depression.  · Trouble sleeping (insomnia).  Week 3 and afterward  After the first 2-3 weeks of quitting, you may start to notice more positive results, such as:  · Improved sense of smell and taste.  · Decreased coughing and sore throat.  · Slower heart rate.  · Lower blood pressure.  · Clearer skin.  · The ability to breathe more easily.  · Fewer sick days.  Quitting smoking can be very challenging. Do not get discouraged if you are not successful the first time. Some people need to make many attempts to quit before they achieve long-term success. Do your best to stick to your quit plan, and talk with your health care provider if you have any questions or concerns.  Summary  · Smoking tobacco  is the leading cause of preventable death. Quitting smoking is one of the best things that you can do for your health.  · When you decide to quit smoking, create a plan to help you succeed.  · Quit smoking right away, not slowly over a period of time.  · When you start quitting, seek help from your health care provider, family, or friends.  This information is not intended to replace advice given to you by your health care provider. Make sure you discuss any questions you have with your health care provider.  Document Revised: 09/11/2020 Document Reviewed: 03/07/2020  Elsevier Patient Education © 2021 [employer] Inc.

## 2021-10-19 LAB — BACTERIA SPEC AEROBE CULT: NO GROWTH

## 2021-12-12 DIAGNOSIS — G89.29 CHRONIC BILATERAL LOW BACK PAIN WITH BILATERAL SCIATICA: ICD-10-CM

## 2021-12-12 DIAGNOSIS — M54.41 CHRONIC BILATERAL LOW BACK PAIN WITH BILATERAL SCIATICA: ICD-10-CM

## 2021-12-12 DIAGNOSIS — M54.42 CHRONIC BILATERAL LOW BACK PAIN WITH BILATERAL SCIATICA: ICD-10-CM

## 2021-12-13 RX ORDER — GABAPENTIN 100 MG/1
CAPSULE ORAL
Qty: 60 CAPSULE | Refills: 1 | OUTPATIENT
Start: 2021-12-13

## 2021-12-22 ENCOUNTER — TELEPHONE (OUTPATIENT)
Dept: FAMILY MEDICINE CLINIC | Age: 46
End: 2021-12-22

## 2021-12-22 NOTE — TELEPHONE ENCOUNTER
Spoke to pt appt moved to 1/4/21 because in previous message Dr Carey states no refills until seen.

## 2021-12-22 NOTE — TELEPHONE ENCOUNTER
PT STATES THAT SHE WILL NEED MED REFILLS SOMETIME BETWEEN 12/27-31ST. COULD NOT SCHEDULE HER WITHIN THAT TIME, HAS APPT WITH [facility] ON 01/19, BUT WILL RUN OUT OF MEDS BEFORE THEN. PLEASE CALL & ADVISE.

## 2022-01-04 ENCOUNTER — OFFICE VISIT (OUTPATIENT)
Dept: FAMILY MEDICINE CLINIC | Age: 47
End: 2022-01-04

## 2022-01-04 VITALS
DIASTOLIC BLOOD PRESSURE: 74 MMHG | BODY MASS INDEX: 27.32 KG/M2 | SYSTOLIC BLOOD PRESSURE: 128 MMHG | HEIGHT: 65 IN | HEART RATE: 86 BPM | WEIGHT: 164 LBS | TEMPERATURE: 97.6 F

## 2022-01-04 DIAGNOSIS — M54.42 CHRONIC BILATERAL LOW BACK PAIN WITH BILATERAL SCIATICA: Primary | ICD-10-CM

## 2022-01-04 DIAGNOSIS — Z79.899 ENCOUNTER FOR LONG-TERM (CURRENT) USE OF OTHER MEDICATIONS: ICD-10-CM

## 2022-01-04 DIAGNOSIS — M54.41 CHRONIC BILATERAL LOW BACK PAIN WITH BILATERAL SCIATICA: Primary | ICD-10-CM

## 2022-01-04 DIAGNOSIS — F41.9 ANXIETY: ICD-10-CM

## 2022-01-04 DIAGNOSIS — K21.9 GASTROESOPHAGEAL REFLUX DISEASE WITHOUT ESOPHAGITIS: ICD-10-CM

## 2022-01-04 DIAGNOSIS — G89.29 CHRONIC BILATERAL LOW BACK PAIN WITH BILATERAL SCIATICA: Primary | ICD-10-CM

## 2022-01-04 LAB
AMPHET+METHAMPHET UR QL: NEGATIVE
AMPHETAMINES UR QL: NEGATIVE
BARBITURATES UR QL SCN: NEGATIVE
BENZODIAZ UR QL SCN: NEGATIVE
BUPRENORPHINE SERPL-MCNC: NEGATIVE NG/ML
CANNABINOIDS SERPL QL: NEGATIVE
COCAINE UR QL: NEGATIVE
EXPIRATION DATE: NORMAL
Lab: NORMAL
MDMA UR QL SCN: NEGATIVE
METHADONE UR QL SCN: NEGATIVE
OPIATES UR QL: NEGATIVE
OXYCODONE UR QL SCN: NEGATIVE
PCP UR QL SCN: NEGATIVE

## 2022-01-04 PROCEDURE — 80305 DRUG TEST PRSMV DIR OPT OBS: CPT | Performed by: FAMILY MEDICINE

## 2022-01-04 PROCEDURE — 99214 OFFICE O/P EST MOD 30 MIN: CPT | Performed by: FAMILY MEDICINE

## 2022-01-04 RX ORDER — ESCITALOPRAM OXALATE 20 MG/1
20 TABLET ORAL DAILY
Qty: 30 TABLET | Refills: 2 | Status: SHIPPED | OUTPATIENT
Start: 2022-01-04 | End: 2022-03-23 | Stop reason: SDUPTHER

## 2022-01-04 RX ORDER — GABAPENTIN 100 MG/1
100 CAPSULE ORAL 2 TIMES DAILY
Qty: 60 CAPSULE | Refills: 2 | Status: SHIPPED | OUTPATIENT
Start: 2022-01-04 | End: 2022-03-16

## 2022-01-04 NOTE — ASSESSMENT & PLAN NOTE
"She is under a lot of stress right now.  Her  continues to struggle with idiopathic pulmonary fibrosis in his clinical status has been worsening significantly over the past few months.  She is worried that they are getting into an end-stage and \"I need to be able to hold it together.\"  She is currently on Lexapro 10 mg so we will bump her up to 20 mg.  Refills sent.    "

## 2022-01-04 NOTE — PROGRESS NOTES
"Chief Complaint  Anxiety (follow up )    Subjective          Gabriela Riggs presents to [facility] today for follow-up on routine issues.    She is on escitalopram for anxiety.  Her main source of stress is her 's diagnosis of idiopathic pulmonary fibrosis.    She has been on buspirone in the past but is not currently taking.  She has done \"so well\" on it generally.  However, her  has declined lately      She is on gabapentin for history of chronic low back pain.  This does work well to control her pain.  It does also seem to help her anxiety and depression.  She denies adverse effects.    She is on omeprazole for GERD.        She is no longer taking Synthroid for hypothyroidism.  We have been monitoring TSH and it has been WNL.      Current Outpatient Medications:   •  escitalopram (LEXAPRO) 20 MG tablet, Take 1 tablet by mouth Daily., Disp: 30 tablet, Rfl: 2  •  gabapentin (NEURONTIN) 100 MG capsule, Take 1 capsule by mouth 2 (Two) Times a Day., Disp: 60 capsule, Rfl: 2  •  omeprazole (priLOSEC) 40 MG capsule, Take 40 mg by mouth Daily., Disp: , Rfl:     Allergies:  Patient has no known allergies.      Objective   Vital Signs:   /74 (BP Location: Left arm, Patient Position: Sitting)   Pulse 86   Temp 97.6 °F (36.4 °C) (Oral)   Ht 165.1 cm (65\")   Wt 74.4 kg (164 lb)   BMI 27.29 kg/m²     Physical Exam  Vitals reviewed.   Constitutional:       General: She is not in acute distress.     Appearance: Normal appearance. She is well-developed.   HENT:      Head: Normocephalic and atraumatic.      Right Ear: External ear normal.      Left Ear: External ear normal.      Nose: Nose normal.      Mouth/Throat:      Mouth: Mucous membranes are moist.   Eyes:      Extraocular Movements: Extraocular movements intact.      Conjunctiva/sclera: Conjunctivae normal.      Pupils: Pupils are equal, round, and reactive to light.   Cardiovascular:      Rate and Rhythm: Normal " "rate and regular rhythm.      Heart sounds: No murmur heard.      Pulmonary:      Effort: Pulmonary effort is normal.      Breath sounds: Normal breath sounds. No wheezing, rhonchi or rales.   Abdominal:      General: Bowel sounds are normal. There is no distension.      Palpations: Abdomen is soft.      Tenderness: There is no abdominal tenderness.   Musculoskeletal:         General: Normal range of motion.   Neurological:      Mental Status: She is alert.   Psychiatric:         Mood and Affect: Affect normal.             Assessment and Plan    Diagnoses and all orders for this visit:    1. Chronic bilateral low back pain with bilateral sciatica (Primary)  Assessment & Plan:  Stable on current regimen.  Symptoms are well controlled.  No adverse effects. She does require ongoing use of this controlled substance to function.  Tox screen was due today.  Prior tox screen appropriate.  Julius was run today.  Refills were needed today.  RTC 3 months.      Orders:  -     gabapentin (NEURONTIN) 100 MG capsule; Take 1 capsule by mouth 2 (Two) Times a Day.  Dispense: 60 capsule; Refill: 2    2. Encounter for long-term (current) use of other medications  -     POC Urine Drug Screen [facility]    3. Anxiety  Assessment & Plan:  She is under a lot of stress right now.  Her  continues to struggle with idiopathic pulmonary fibrosis in his clinical status has been worsening significantly over the past few months.  She is worried that they are getting into an end-stage and \"I need to be able to hold it together.\"  She is currently on Lexapro 10 mg so we will bump her up to 20 mg.  Refills sent.      Orders:  -     escitalopram (LEXAPRO) 20 MG tablet; Take 1 tablet by mouth Daily.  Dispense: 30 tablet; Refill: 2    4. Gastroesophageal reflux disease without esophagitis  Assessment & Plan:  Stable.  No refills needed.          Follow Up   Return in about 3 months (around 4/4/2022) for Annual physical.  Patient was given " instructions and counseling regarding her condition or for health maintenance advice. Please see specific information pulled into the AVS if appropriate.

## 2022-01-04 NOTE — ASSESSMENT & PLAN NOTE
Stable on current regimen.  Symptoms are well controlled.  No adverse effects. She does require ongoing use of this controlled substance to function.  Tox screen was due today.  Prior tox screen appropriate.  Julius was run today.  Refills were needed today.  RTC 3 months.

## 2022-03-16 DIAGNOSIS — M54.42 CHRONIC BILATERAL LOW BACK PAIN WITH BILATERAL SCIATICA: ICD-10-CM

## 2022-03-16 DIAGNOSIS — G89.29 CHRONIC BILATERAL LOW BACK PAIN WITH BILATERAL SCIATICA: ICD-10-CM

## 2022-03-16 DIAGNOSIS — M54.41 CHRONIC BILATERAL LOW BACK PAIN WITH BILATERAL SCIATICA: ICD-10-CM

## 2022-03-16 RX ORDER — GABAPENTIN 100 MG/1
CAPSULE ORAL
Qty: 60 CAPSULE | Refills: 2 | Status: SHIPPED | OUTPATIENT
Start: 2022-03-16 | End: 2022-08-26

## 2022-03-23 ENCOUNTER — DOCUMENTATION (OUTPATIENT)
Dept: FAMILY MEDICINE CLINIC | Age: 47
End: 2022-03-23

## 2022-03-23 DIAGNOSIS — F41.9 ANXIETY: Primary | ICD-10-CM

## 2022-03-23 RX ORDER — ESCITALOPRAM OXALATE 20 MG/1
20 TABLET ORAL DAILY
Qty: 7 TABLET | Refills: 0 | Status: SHIPPED | OUTPATIENT
Start: 2022-03-23 | End: 2022-04-08

## 2022-03-26 NOTE — PROGRESS NOTES
Gabriela contacted me to see if I would be willing to send a few days of her Lexapro to a pharmacy in [place].  She had to go out of town unexpectedly for an illness with her mother.  I did send 7 days of Lexapro 20 mg to the requested pharmacy.  Thanks.

## 2022-04-08 DIAGNOSIS — F41.9 ANXIETY: ICD-10-CM

## 2022-04-08 RX ORDER — ESCITALOPRAM OXALATE 20 MG/1
TABLET ORAL
Qty: 30 TABLET | Refills: 2 | Status: SHIPPED | OUTPATIENT
Start: 2022-04-08 | End: 2022-06-02 | Stop reason: SDUPTHER

## 2022-04-20 ENCOUNTER — OFFICE VISIT (OUTPATIENT)
Dept: FAMILY MEDICINE CLINIC | Age: 47
End: 2022-04-20

## 2022-04-20 VITALS
OXYGEN SATURATION: 99 % | HEIGHT: 65 IN | SYSTOLIC BLOOD PRESSURE: 122 MMHG | BODY MASS INDEX: 27.52 KG/M2 | WEIGHT: 165.2 LBS | HEART RATE: 90 BPM | DIASTOLIC BLOOD PRESSURE: 81 MMHG

## 2022-04-20 DIAGNOSIS — N39.0 URINARY TRACT INFECTION WITH HEMATURIA, SITE UNSPECIFIED: Primary | ICD-10-CM

## 2022-04-20 DIAGNOSIS — R31.9 URINARY TRACT INFECTION WITH HEMATURIA, SITE UNSPECIFIED: Primary | ICD-10-CM

## 2022-04-20 DIAGNOSIS — R31.9 HEMATURIA, UNSPECIFIED TYPE: ICD-10-CM

## 2022-04-20 DIAGNOSIS — R30.0 DYSURIA: ICD-10-CM

## 2022-04-20 LAB
BILIRUB BLD-MCNC: NEGATIVE MG/DL
CLARITY, POC: CLEAR
COLOR UR: ABNORMAL
EXPIRATION DATE: ABNORMAL
GLUCOSE UR STRIP-MCNC: NEGATIVE MG/DL
KETONES UR QL: NEGATIVE
LEUKOCYTE EST, POC: ABNORMAL
Lab: ABNORMAL
NITRITE UR-MCNC: POSITIVE MG/ML
PH UR: 5.5 [PH] (ref 5–8)
PROT UR STRIP-MCNC: NEGATIVE MG/DL
RBC # UR STRIP: ABNORMAL /UL
SP GR UR: 1.01 (ref 1–1.03)
UROBILINOGEN UR QL: NORMAL

## 2022-04-20 PROCEDURE — 87186 SC STD MICRODIL/AGAR DIL: CPT | Performed by: NURSE PRACTITIONER

## 2022-04-20 PROCEDURE — 99213 OFFICE O/P EST LOW 20 MIN: CPT | Performed by: NURSE PRACTITIONER

## 2022-04-20 PROCEDURE — 87086 URINE CULTURE/COLONY COUNT: CPT | Performed by: NURSE PRACTITIONER

## 2022-04-20 PROCEDURE — 81003 URINALYSIS AUTO W/O SCOPE: CPT | Performed by: NURSE PRACTITIONER

## 2022-04-20 RX ORDER — CEFDINIR 300 MG/1
300 CAPSULE ORAL 2 TIMES DAILY
Qty: 20 CAPSULE | Refills: 0 | Status: SHIPPED | OUTPATIENT
Start: 2022-04-20 | End: 2022-04-30

## 2022-04-20 NOTE — PROGRESS NOTES
"Chief Complaint  Gabriela Riggs presents to [facility] for Urinary Tract Infection    Subjective          Urinary Tract Infection: Patient complains of` burning with urination, dysuria and nausea She has had symptoms for 1 days. Patient also complains of back pain. Patient does have a history of recurrent UTI.  Patient does not have a history of pyelonephritis.           Review of Systems      No Known Allergies   Past Medical History:   Diagnosis Date   • Anxiety    • Atypical chest pain    • Bulging lumbar disc 2013   • Hashimoto's thyroiditis    • Palpitation      Current Outpatient Medications   Medication Sig Dispense Refill   • escitalopram (LEXAPRO) 20 MG tablet TAKE ONE TABLET BY MOUTH EVERY DAY 30 tablet 2   • gabapentin (NEURONTIN) 100 MG capsule TAKE ONE CAPSULE BY MOUTH TWICE DAILY 60 capsule 2   • omeprazole (priLOSEC) 40 MG capsule Take 40 mg by mouth Daily.     • cefdinir (OMNICEF) 300 MG capsule Take 1 capsule by mouth 2 (Two) Times a Day for 10 days. 20 capsule 0     No current facility-administered medications for this visit.     Past Surgical History:   Procedure Laterality Date   • CHOLECYSTECTOMY  2014   • SUBTOTAL HYSTERECTOMY  2000      Social History     Tobacco Use   • Smoking status: Current Every Day Smoker     Packs/day: 1.00     Years: 19.00     Pack years: 19.00     Start date: 2003   • Smokeless tobacco: Never Used   • Tobacco comment: \"eventually\"- ready to quit smoking 4/20/2022   Vaping Use   • Vaping Use: Never used   Substance Use Topics   • Alcohol use: No   • Drug use: Never     History reviewed. No pertinent family history.  Health Maintenance Due   Topic Date Due   • ANNUAL PHYSICAL  Never done   • Pneumococcal Vaccine 0-64 (1 - PCV) Never done   • TDAP/TD VACCINES (1 - Tdap) Never done   • HEPATITIS C SCREENING  Never done   • COVID-19 Vaccine (3 - Booster for Moderna series) 01/09/2022      Immunization History   Administered Date(s) " "Administered   • COVID-19 (MODERNA) 1st, 2nd, 3rd Dose Only 07/07/2021, 08/09/2021   • Influenza TIV (IM) 10/01/2016, 11/01/2017   • Influenza, Unspecified 10/02/2020        Objective     Vitals:    04/20/22 1136   BP: 122/81   Pulse: 90   SpO2: 99%   Weight: 74.9 kg (165 lb 3.2 oz)   Height: 165.1 cm (65\")     Body mass index is 27.49 kg/m².     Physical Exam  Vitals reviewed.   Constitutional:       General: She is not in acute distress.     Appearance: Normal appearance. She is ill-appearing.   HENT:      Mouth/Throat:      Mouth: Mucous membranes are moist.   Eyes:      Extraocular Movements: Extraocular movements intact.      Pupils: Pupils are equal, round, and reactive to light.   Cardiovascular:      Rate and Rhythm: Normal rate and regular rhythm.      Heart sounds: No murmur heard.  Pulmonary:      Effort: Pulmonary effort is normal.      Breath sounds: Normal breath sounds. No wheezing, rhonchi or rales.   Abdominal:      Palpations: Abdomen is soft.      Tenderness: There is abdominal tenderness (mild). There is no right CVA tenderness, left CVA tenderness, guarding or rebound.   Musculoskeletal:         General: Normal range of motion.   Neurological:      Mental Status: She is alert.           Result Review :                               Assessment and Plan      Diagnoses and all orders for this visit:    1. Urinary tract infection with hematuria, site unspecified (Primary)  Comments:  instructed to increase water, avoid soda/high acidity foods/drinks  RTO if worsening - History of antibiotic resistant UTI  Orders:  -     cefdinir (OMNICEF) 300 MG capsule; Take 1 capsule by mouth 2 (Two) Times a Day for 10 days.  Dispense: 20 capsule; Refill: 0    2. Dysuria  -     POCT urinalysis dipstick, automated  -     Urine Culture - Urine, Urine, Clean Catch; Future  -     Urine Culture - Urine, Urine, Clean Catch              Follow Up     Return if symptoms worsen or fail to improve.             "

## 2022-04-22 LAB — BACTERIA SPEC AEROBE CULT: ABNORMAL

## 2022-05-11 ENCOUNTER — TELEPHONE (OUTPATIENT)
Dept: FAMILY MEDICINE CLINIC | Age: 47
End: 2022-05-11

## 2022-05-16 ENCOUNTER — TELEPHONE (OUTPATIENT)
Dept: FAMILY MEDICINE CLINIC | Age: 47
End: 2022-05-16

## 2022-05-16 NOTE — TELEPHONE ENCOUNTER
Left detailed voicemail (ok per ALEKSANDAR) in regards to overdue orders for Urinalysis - advised pt to complete. Pt has appt with Dr. Carey on 05/19/22.

## 2022-05-26 ENCOUNTER — TELEMEDICINE (OUTPATIENT)
Dept: FAMILY MEDICINE CLINIC | Age: 47
End: 2022-05-26

## 2022-05-26 DIAGNOSIS — J20.9 ACUTE BRONCHITIS, UNSPECIFIED ORGANISM: Primary | ICD-10-CM

## 2022-05-26 PROCEDURE — 99213 OFFICE O/P EST LOW 20 MIN: CPT | Performed by: FAMILY MEDICINE

## 2022-05-26 RX ORDER — AZITHROMYCIN 250 MG/1
TABLET, FILM COATED ORAL
Qty: 6 TABLET | Refills: 0 | Status: SHIPPED | OUTPATIENT
Start: 2022-05-26 | End: 2022-06-02

## 2022-05-26 NOTE — PROGRESS NOTES
"Gabriela Riggs presents to [facility].    Chief Complaint:  Congestion    TELEHEALTH VISIT:   - Gabriela consented to this telehealth visit.   - Persons on the call include:  patient - Dr. Jason Ochoa   - The patient is at home.  I am at home.   - This visit is being conducted over Zoom with audio and video capability.   - This visit is being done remotely due to the ongoing COVID-19 pandemic.    Subjective       History of Present Illness:  Gabriela is being seen today for follow-up on sinus and chest congestion.  This initially started with mostly sinus issues.  However, she has in the last day or 2 started with cough.  She is producing some green sputum.  She denies fever or chills with this.  She has been vaccinated for COVID-19, but she has not had a booster up to this time.  She did have COVID-19 in February of this year during the omicron surge.  She has not tested at home yet but she is getting ready to.      Review of Systems:  Review of Systems   Constitutional: Negative for chills and fever.   Respiratory: Positive for cough. Negative for shortness of breath.    Cardiovascular: Negative for chest pain and palpitations.   Gastrointestinal: Negative for abdominal pain, nausea and vomiting.        Objective   Medical History:  Past Medical History:   • Anxiety   • Atypical chest pain   • Bulging lumbar disc   • Hashimoto's thyroiditis   • Palpitation     Past Surgical History:   • CHOLECYSTECTOMY   • SUBTOTAL HYSTERECTOMY      History reviewed. No pertinent family history.  Social History     Tobacco Use   • Smoking status: Current Every Day Smoker     Packs/day: 1.00     Years: 19.00     Pack years: 19.00     Start date: 2003   • Smokeless tobacco: Never Used   • Tobacco comment: \"eventually\"- ready to quit smoking 4/20/2022   Substance Use Topics   • Alcohol use: No       Health Maintenance Due   Topic Date Due   • ANNUAL PHYSICAL  Never done   • Pneumococcal Vaccine " 0-64 (1 - PCV) Never done   • TDAP/TD VACCINES (1 - Tdap) Never done   • HEPATITIS C SCREENING  Never done   • COVID-19 Vaccine (3 - Booster for Moderna series) 01/09/2022        Immunization History   Administered Date(s) Administered   • COVID-19 (MODERNA) 1st, 2nd, 3rd Dose Only 07/07/2021, 08/09/2021   • Influenza TIV (IM) 10/01/2016, 11/01/2017   • Influenza, Unspecified 10/02/2020       No Known Allergies     Medications:  Current Outpatient Medications on File Prior to Visit   Medication Sig   • escitalopram (LEXAPRO) 20 MG tablet TAKE ONE TABLET BY MOUTH EVERY DAY   • gabapentin (NEURONTIN) 100 MG capsule TAKE ONE CAPSULE BY MOUTH TWICE DAILY   • omeprazole (priLOSEC) 40 MG capsule Take 40 mg by mouth Daily.     No current facility-administered medications on file prior to visit.       Vital Signs:   There were no vitals taken for this visit.      Physical Exam:  Physical Exam  Vitals reviewed.   Constitutional:       General: She is not in acute distress.     Appearance: She is not ill-appearing.   Eyes:      Pupils: Pupils are equal, round, and reactive to light.   Neck:      Comments: No thyromegaly  Pulmonary:      Effort: Pulmonary effort is normal.   Lymphadenopathy:      Cervical: No cervical adenopathy (No visible).   Skin:     Findings: No lesion or rash.   Neurological:      General: No focal deficit present.      Mental Status: She is alert.      Cranial Nerves: No cranial nerve deficit.      Motor: No weakness.         Result Review      The following data was reviewed by Jose Ramon Gomez MD on 05/26/2022.  Lab Results   Component Value Date    WBC 9.48 04/13/2021    HGB 13.10 04/13/2021    HCT 40.4 04/13/2021    MCV 95.1 04/13/2021    .00 04/13/2021     Lab Results   Component Value Date    GLUCOSE 94 04/13/2021    BUN 8 04/13/2021    CREATININE 0.76 04/13/2021     04/13/2021    K 4.0 04/13/2021     04/13/2021    CO2 23 04/13/2021    CALCIUM 9.0 04/13/2021    PROTEINTOT  7.1 04/13/2021    ALBUMIN 4.2 04/13/2021    ALT 12 04/13/2021    AST 14 (L) 04/13/2021    ALKPHOS 60 04/13/2021    BILITOT 0.19 (L) 04/13/2021    EGFRIFNONA 92 08/25/2019    GLOB 2.9 04/13/2021    AGRATIO 1.7 08/25/2019    BCR 11 04/13/2021    ANIONGAP 16 04/13/2021      Lab Results   Component Value Date    CHLPL 195 02/13/2020    TRIG 72 02/13/2020    HDL 64 (H) 02/13/2020     (H) 02/13/2020     Lab Results   Component Value Date    TSH 0.949 02/13/2020     No results found for: HGBA1C         Assessment and Plan:   Today, we have reviewed her care.  Because of her change in symptoms over the last couple of days, we will move ahead with a Z-Chuck as noted.  We will hold off on steroids or other treatment at this time.  I am hopeful that this will get her feeling better.  I did ask her to move ahead with COVID-19 home test as a precaution.  Her  has significant lung disease.  If her test is positive, then we may want to move ahead with preventive treatment for him.  She will contact me with those results.       Diagnoses and all orders for this visit:    1. Acute bronchitis, unspecified organism (Primary)  -     azithromycin (Zithromax Z-Chuck) 250 MG tablet; Take 2 tablets by mouth on day 1, then 1 tablet daily on days 2-5  Dispense: 6 tablet; Refill: 0          Follow Up   Return if symptoms worsen or fail to improve.  Patient was given instructions and counseling regarding her condition or for health maintenance advice. Please see specific information pulled into the AVS if appropriate.

## 2022-06-02 ENCOUNTER — OFFICE VISIT (OUTPATIENT)
Dept: FAMILY MEDICINE CLINIC | Age: 47
End: 2022-06-02

## 2022-06-02 VITALS
HEART RATE: 78 BPM | BODY MASS INDEX: 27.29 KG/M2 | DIASTOLIC BLOOD PRESSURE: 74 MMHG | WEIGHT: 164 LBS | SYSTOLIC BLOOD PRESSURE: 103 MMHG

## 2022-06-02 DIAGNOSIS — Z00.00 ANNUAL PHYSICAL EXAM: Primary | ICD-10-CM

## 2022-06-02 DIAGNOSIS — K21.9 GASTROESOPHAGEAL REFLUX DISEASE WITHOUT ESOPHAGITIS: ICD-10-CM

## 2022-06-02 DIAGNOSIS — Z12.31 SCREENING MAMMOGRAM, ENCOUNTER FOR: ICD-10-CM

## 2022-06-02 DIAGNOSIS — E03.9 ACQUIRED HYPOTHYROIDISM: ICD-10-CM

## 2022-06-02 DIAGNOSIS — Z11.59 ENCOUNTER FOR SCREENING FOR OTHER VIRAL DISEASES: ICD-10-CM

## 2022-06-02 DIAGNOSIS — F41.9 ANXIETY: ICD-10-CM

## 2022-06-02 PROCEDURE — 99213 OFFICE O/P EST LOW 20 MIN: CPT | Performed by: FAMILY MEDICINE

## 2022-06-02 PROCEDURE — 99396 PREV VISIT EST AGE 40-64: CPT | Performed by: FAMILY MEDICINE

## 2022-06-02 RX ORDER — ESCITALOPRAM OXALATE 20 MG/1
20 TABLET ORAL DAILY
Qty: 90 TABLET | Refills: 1 | Status: SHIPPED | OUTPATIENT
Start: 2022-06-02 | End: 2022-11-30

## 2022-06-02 NOTE — PROGRESS NOTES
Chief Complaint  Annual Exam    Subjective          Gabriela Riggs presents to [facility] today for her annual physical.  She is UTD on colonoscopy, last done 7/2019 and this was normal.  Ten year repeat recommended.  Pap smear is no longer indicated by history; s/p hysterectomy.  She is UTD on mammogram, last done 6/2021 and this was normal.   She is up-to-date on COVID (x2, due for shot #3 - [facility]a [facility]).  She is reportedly UTD on Tdap (8/2021).  Flu shot is not currently indicated.  She is due for routine labs.    She is on escitalopram for anxiety.  We increased this to 20 mg at her last visit and this has been working well.  Her main source of stress is her 's diagnosis of idiopathic pulmonary fibrosis.  He has really declined significantly in the last 3 months.  They are getting to the end of what medical management can do for him.      She is on gabapentin for history of chronic low back pain.  This does work well to control her pain.  It does also seem to help her anxiety and depression.  She denies adverse effects.     She is on omeprazole for GERD.  No indigestion or reflux.      She is no longer taking Synthroid for hypothyroidism.  We have been monitoring TSH and it has been WNL.    Review of Systems   Constitutional: Negative for chills, fatigue and fever.   HENT: Negative for congestion, hearing loss and rhinorrhea.    Eyes: Negative for pain and visual disturbance.   Respiratory: Negative for cough and shortness of breath.    Cardiovascular: Negative for chest pain and palpitations.   Gastrointestinal: Negative for abdominal pain, constipation, diarrhea, nausea and vomiting.   Genitourinary: Negative for dysuria and hematuria.   Musculoskeletal: Negative for arthralgias and myalgias.   Skin: Negative for rash.   Neurological: Negative for weakness and numbness.   Psychiatric/Behavioral: Negative for dysphoric  mood and sleep disturbance. The patient is not nervous/anxious.          Current Outpatient Medications:   •  escitalopram (LEXAPRO) 20 MG tablet, Take 1 tablet by mouth Daily., Disp: 90 tablet, Rfl: 1  •  gabapentin (NEURONTIN) 100 MG capsule, TAKE ONE CAPSULE BY MOUTH TWICE DAILY, Disp: 60 capsule, Rfl: 2  •  omeprazole (priLOSEC) 40 MG capsule, Take 40 mg by mouth Daily., Disp: , Rfl:     Allergies:  Patient has no known allergies.      Objective   Vital Signs:   Vitals:    06/02/22 0825   BP: 103/74   BP Location: Left arm   Patient Position: Sitting   Pulse: 78   Weight: 74.4 kg (164 lb)     Physical Exam        Assessment and Plan    Diagnoses and all orders for this visit:    1. Annual physical exam (Primary)  Assessment & Plan:  She is UTD on colonoscopy, last done 7/2019 and this was normal.  Ten year repeat recommended.  Pap smear is no longer indicated by history; s/p hysterectomy.  She is UTD on mammogram, last done 6/2021 and this was normal; repeat ordered.   She is up-to-date on COVID (x2, due for shot #3 - [facility] Moderna booster).  She is UTD on Tdap (8/2021).  Flu shot is not currently indicated.  She is due for routine labs; ordered.    Orders:  -     Lipid Panel; Future  -     Comprehensive Metabolic Panel; Future    2. Anxiety  Assessment & Plan:  Stable on Lexapro 20 mg daily.  She has been doing well since we increased this at her last visit.  Refills sent today.    Orders:  -     escitalopram (LEXAPRO) 20 MG tablet; Take 1 tablet by mouth Daily.  Dispense: 90 tablet; Refill: 1    3. Acquired hypothyroidism  Assessment & Plan:  Has been stable off of levothyroxine.  We are checking yearly.  Labs ordered.    Orders:  -     TSH; Future    4. Gastroesophageal reflux disease without esophagitis  Assessment & Plan:  Stable on omeprazole 40 mg daily.  No refills needed.      5. Encounter for screening for other viral diseases  -     Hepatitis C Antibody;  Future    6. Screening mammogram, encounter for  -     Mammo Screening Digital Tomosynthesis Bilateral With CAD; Future      Follow Up   Return in about 1 year (around 6/2/2023) for Annual physical.  Patient was given instructions and counseling regarding her condition or for health maintenance advice. Please see specific information pulled into the AVS if appropriate.

## 2022-06-02 NOTE — ASSESSMENT & PLAN NOTE
Stable on Lexapro 20 mg daily.  She has been doing well since we increased this at her last visit.  Refills sent today.

## 2022-06-03 ENCOUNTER — PATIENT MESSAGE (OUTPATIENT)
Dept: FAMILY MEDICINE CLINIC | Age: 47
End: 2022-06-03

## 2022-06-03 DIAGNOSIS — J20.9 ACUTE BRONCHITIS, UNSPECIFIED ORGANISM: Primary | ICD-10-CM

## 2022-06-03 RX ORDER — ALBUTEROL SULFATE 90 UG/1
2 AEROSOL, METERED RESPIRATORY (INHALATION) EVERY 6 HOURS PRN
Qty: 18 G | Refills: 1 | Status: SHIPPED | OUTPATIENT
Start: 2022-06-03

## 2022-06-03 RX ORDER — PREDNISONE 20 MG/1
TABLET ORAL
Qty: 10 TABLET | Refills: 0 | Status: SHIPPED | OUTPATIENT
Start: 2022-06-03 | End: 2022-08-03

## 2022-06-03 NOTE — TELEPHONE ENCOUNTER
From: Gabriela Riggs  To: Jose Ramon Gomez MD  Sent: 6/3/2022 12:56 PM EDT  Subject: Question     Hi Dr. Gomez-     Finished my antibiotic and much better, but still have some  Productive cough and a little burning and barking. Seemed to get a little tighter since last night. Should I just wait it out?

## 2022-06-13 ENCOUNTER — TELEPHONE (OUTPATIENT)
Dept: FAMILY MEDICINE CLINIC | Age: 47
End: 2022-06-13

## 2022-07-05 ENCOUNTER — APPOINTMENT (OUTPATIENT)
Dept: MAMMOGRAPHY | Facility: HOSPITAL | Age: 47
End: 2022-07-05

## 2022-08-02 ENCOUNTER — TELEPHONE (OUTPATIENT)
Dept: FAMILY MEDICINE CLINIC | Age: 47
End: 2022-08-02

## 2022-08-02 NOTE — TELEPHONE ENCOUNTER
8/2/22 @ 11:00AM [facility] - checking to see if patient has had mammogram done that was ordered 6/2022 8/16/22 - 2nd attempt to reach regarding overdue order for mammogram.  Letter mailed

## 2022-08-03 ENCOUNTER — OFFICE VISIT (OUTPATIENT)
Dept: FAMILY MEDICINE CLINIC | Age: 47
End: 2022-08-03

## 2022-08-03 VITALS
BODY MASS INDEX: 27.22 KG/M2 | SYSTOLIC BLOOD PRESSURE: 116 MMHG | HEIGHT: 65 IN | DIASTOLIC BLOOD PRESSURE: 69 MMHG | HEART RATE: 76 BPM | WEIGHT: 163.4 LBS

## 2022-08-03 DIAGNOSIS — L02.31 CELLULITIS AND ABSCESS OF BUTTOCK: Primary | ICD-10-CM

## 2022-08-03 DIAGNOSIS — L03.317 CELLULITIS AND ABSCESS OF BUTTOCK: Primary | ICD-10-CM

## 2022-08-03 PROBLEM — Z00.00 ANNUAL PHYSICAL EXAM: Status: RESOLVED | Noted: 2022-06-02 | Resolved: 2022-08-03

## 2022-08-03 PROCEDURE — 99213 OFFICE O/P EST LOW 20 MIN: CPT | Performed by: FAMILY MEDICINE

## 2022-08-03 RX ORDER — SULFAMETHOXAZOLE AND TRIMETHOPRIM 800; 160 MG/1; MG/1
1 TABLET ORAL 2 TIMES DAILY
Qty: 14 TABLET | Refills: 0 | Status: SHIPPED | OUTPATIENT
Start: 2022-08-03 | End: 2022-08-10

## 2022-08-03 NOTE — ASSESSMENT & PLAN NOTE
Abscess and mild cellulitis of the right buttock.  It does not look like this will require incision and drainage as it feels more indurated than fluctuant and has a large pustule head overlying.  I suspect any purulence will be amenable to drainage via conservative measures, i.e., the warm Epsom salt soaks that she has been religiously doing.  I would encourage her to continue those until she has achieve drainage of the site.  In the meantime, given the associated cellulitis, I will send her in a 7-day course of Bactrim to cover her for MRSA and E. coli given the position of the abscess.  She has been counseled on warning signs and symptoms and will call or return to clinic should any of these occur.

## 2022-08-03 NOTE — PROGRESS NOTES
"Chief Complaint  Blister (Boil on bottom x 5 days ago)    Subjective          Gabriela Riggs presents to [facility] today for a boil on her right buttock for the past 5 days.  Her daughter accompanies her today.  No fever or chills, no nausea or vomiting.  No drainage.  She has been sitting in warm Epsom salt soaks.  That seems to be pulling the head to the surface.  No previous episodes.      Current Outpatient Medications:   •  albuterol sulfate  (90 Base) MCG/ACT inhaler, Inhale 2 puffs Every 6 (Six) Hours As Needed for Wheezing., Disp: 18 g, Rfl: 1  •  escitalopram (LEXAPRO) 20 MG tablet, Take 1 tablet by mouth Daily., Disp: 90 tablet, Rfl: 1  •  gabapentin (NEURONTIN) 100 MG capsule, TAKE ONE CAPSULE BY MOUTH TWICE DAILY, Disp: 60 capsule, Rfl: 2  •  omeprazole (priLOSEC) 40 MG capsule, Take 40 mg by mouth Daily., Disp: , Rfl:   •  sulfamethoxazole-trimethoprim (BACTRIM DS,SEPTRA DS) 800-160 MG per tablet, Take 1 tablet by mouth 2 (Two) Times a Day for 7 days., Disp: 14 tablet, Rfl: 0    Allergies:  Patient has no known allergies.      Objective   Vital Signs:   Vitals:    08/03/22 1308   BP: 116/69   BP Location: Right arm   Patient Position: Sitting   Pulse: 76   Weight: 74.1 kg (163 lb 6.4 oz)   Height: 165.1 cm (65\")       Physical Exam  Constitutional:       Appearance: Normal appearance.   HENT:      Head: Normocephalic and atraumatic.   Eyes:      Extraocular Movements: Extraocular movements intact.      Conjunctiva/sclera: Conjunctivae normal.   Pulmonary:      Effort: Pulmonary effort is normal. No respiratory distress.   Musculoskeletal:         General: Normal range of motion.   Skin:     General: Skin is warm and dry.      Comments: [place] sized area of firm induration with overlying bright red erythema on the right medial distal buttock.  Pustule has formed on the head of the nodule.  Mildly tender to palpation   Neurological:      General: No focal " deficit present.      Mental Status: She is alert and oriented to person, place, and time.   Psychiatric:         Mood and Affect: Mood normal.         Behavior: Behavior normal.         Thought Content: Thought content normal.         Judgment: Judgment normal.          Lab Results   Component Value Date    GLUCOSE 94 04/13/2021    BUN 8 04/13/2021    CREATININE 0.76 04/13/2021    EGFRIFNONA 92 08/25/2019    BCR 11 04/13/2021    K 4.0 04/13/2021    CO2 23 04/13/2021    CALCIUM 9.0 04/13/2021    ALBUMIN 4.2 04/13/2021    LABIL2 1.4 04/13/2021    AST 14 (L) 04/13/2021    ALT 12 04/13/2021       Lab Results   Component Value Date    CHLPL 195 02/13/2020    TRIG 72 02/13/2020    HDL 64 (H) 02/13/2020     (H) 02/13/2020            Assessment and Plan    Diagnoses and all orders for this visit:    1. Cellulitis and abscess of buttock (Primary)  Assessment & Plan:  Abscess and mild cellulitis of the right buttock.  It does not look like this will require incision and drainage as it feels more indurated than fluctuant and has a large pustule head overlying.  I suspect any purulence will be amenable to drainage via conservative measures, i.e., the warm Epsom salt soaks that she has been religiously doing.  I would encourage her to continue those until she has achieve drainage of the site.  In the meantime, given the associated cellulitis, I will send her in a 7-day course of Bactrim to cover her for MRSA and E. coli given the position of the abscess.  She has been counseled on warning signs and symptoms and will call or return to clinic should any of these occur.    Orders:  -     sulfamethoxazole-trimethoprim (BACTRIM DS,SEPTRA DS) 800-160 MG per tablet; Take 1 tablet by mouth 2 (Two) Times a Day for 7 days.  Dispense: 14 tablet; Refill: 0      Follow Up   Return if symptoms worsen or fail to improve, for Or as scheduled 6/6/2023.  Patient was given instructions and counseling regarding her condition or for health  maintenance advice. Please see specific information pulled into the AVS if appropriate.

## 2022-08-23 DIAGNOSIS — M54.42 CHRONIC BILATERAL LOW BACK PAIN WITH BILATERAL SCIATICA: ICD-10-CM

## 2022-08-23 DIAGNOSIS — G89.29 CHRONIC BILATERAL LOW BACK PAIN WITH BILATERAL SCIATICA: ICD-10-CM

## 2022-08-23 DIAGNOSIS — M54.41 CHRONIC BILATERAL LOW BACK PAIN WITH BILATERAL SCIATICA: ICD-10-CM

## 2022-08-26 RX ORDER — GABAPENTIN 100 MG/1
CAPSULE ORAL
Qty: 30 CAPSULE | Refills: 0 | Status: SHIPPED | OUTPATIENT
Start: 2022-08-26 | End: 2022-09-15 | Stop reason: SDUPTHER

## 2022-09-15 DIAGNOSIS — M54.41 CHRONIC BILATERAL LOW BACK PAIN WITH BILATERAL SCIATICA: ICD-10-CM

## 2022-09-15 DIAGNOSIS — M54.42 CHRONIC BILATERAL LOW BACK PAIN WITH BILATERAL SCIATICA: ICD-10-CM

## 2022-09-15 DIAGNOSIS — G89.29 CHRONIC BILATERAL LOW BACK PAIN WITH BILATERAL SCIATICA: ICD-10-CM

## 2022-09-15 RX ORDER — GABAPENTIN 100 MG/1
100 CAPSULE ORAL 2 TIMES DAILY
Qty: 60 CAPSULE | Refills: 2 | Status: SHIPPED | OUTPATIENT
Start: 2022-09-15 | End: 2022-12-27

## 2022-09-15 NOTE — TELEPHONE ENCOUNTER
Caller: Gabriela Riggs    Relationship: Self    Best call back number: [phone]    Requested Prescriptions:   Requested Prescriptions     Pending Prescriptions Disp Refills   • gabapentin (NEURONTIN) 100 MG capsule 30 capsule 0     Sig: Take 1 capsule by mouth 2 (Two) Times a Day.        Pharmacy where request should be sent: [facility] [address] [phone] [facility] [phone]      Additional details provided by patient: PATIENT STATED THAT SHE WAS SEEN 8/3/22 AND THIS WAS TO HAVE BEEN SENT TO PHARMACY AND THEY HAVE NOT RECEIVED IT. SHE WILL BE OUT OF MEDICATION AFTER TODAY.     Does the patient have less than a 3 day supply:  [x] Yes  [] No    Aliaz GILLIS Rep   09/15/22 13:19 EDT

## 2022-11-30 ENCOUNTER — OFFICE VISIT (OUTPATIENT)
Dept: FAMILY MEDICINE CLINIC | Age: 47
End: 2022-11-30

## 2022-11-30 ENCOUNTER — LAB (OUTPATIENT)
Dept: LAB | Facility: HOSPITAL | Age: 47
End: 2022-11-30

## 2022-11-30 VITALS
DIASTOLIC BLOOD PRESSURE: 69 MMHG | HEART RATE: 101 BPM | SYSTOLIC BLOOD PRESSURE: 98 MMHG | OXYGEN SATURATION: 98 % | TEMPERATURE: 98.3 F | WEIGHT: 163.2 LBS | HEIGHT: 65 IN | BODY MASS INDEX: 27.19 KG/M2

## 2022-11-30 DIAGNOSIS — R68.83 CHILLS: ICD-10-CM

## 2022-11-30 DIAGNOSIS — Z00.00 ANNUAL PHYSICAL EXAM: ICD-10-CM

## 2022-11-30 DIAGNOSIS — Z11.59 ENCOUNTER FOR SCREENING FOR OTHER VIRAL DISEASES: ICD-10-CM

## 2022-11-30 DIAGNOSIS — R00.0 TACHYCARDIA: ICD-10-CM

## 2022-11-30 DIAGNOSIS — E03.9 ACQUIRED HYPOTHYROIDISM: ICD-10-CM

## 2022-11-30 DIAGNOSIS — R30.0 DYSURIA: ICD-10-CM

## 2022-11-30 DIAGNOSIS — R68.83 CHILLS: Primary | ICD-10-CM

## 2022-11-30 DIAGNOSIS — F41.9 ANXIETY: ICD-10-CM

## 2022-11-30 LAB
ALBUMIN SERPL-MCNC: 4.1 G/DL (ref 3.5–5.2)
ALBUMIN/GLOB SERPL: 1.5 G/DL
ALP SERPL-CCNC: 58 U/L (ref 39–117)
ALT SERPL W P-5'-P-CCNC: 11 U/L (ref 1–33)
ANION GAP SERPL CALCULATED.3IONS-SCNC: 9.1 MMOL/L (ref 5–15)
AST SERPL-CCNC: 16 U/L (ref 1–32)
BACTERIA UR QL AUTO: NORMAL /HPF
BASOPHILS # BLD AUTO: 0.01 10*3/MM3 (ref 0–0.2)
BASOPHILS NFR BLD AUTO: 0.1 % (ref 0–1.5)
BILIRUB SERPL-MCNC: <0.2 MG/DL (ref 0–1.2)
BILIRUB UR QL STRIP: NEGATIVE
BUN SERPL-MCNC: 10 MG/DL (ref 6–20)
BUN/CREAT SERPL: 13.5 (ref 7–25)
CALCIUM SPEC-SCNC: 8.9 MG/DL (ref 8.6–10.5)
CHLORIDE SERPL-SCNC: 104 MMOL/L (ref 98–107)
CHOLEST SERPL-MCNC: 191 MG/DL (ref 0–200)
CLARITY UR: CLEAR
CO2 SERPL-SCNC: 24.9 MMOL/L (ref 22–29)
COLOR UR: YELLOW
CREAT SERPL-MCNC: 0.74 MG/DL (ref 0.57–1)
DEPRECATED RDW RBC AUTO: 46.6 FL (ref 37–54)
EGFRCR SERPLBLD CKD-EPI 2021: 99.9 ML/MIN/1.73
EOSINOPHIL # BLD AUTO: 0.01 10*3/MM3 (ref 0–0.4)
EOSINOPHIL NFR BLD AUTO: 0.1 % (ref 0.3–6.2)
ERYTHROCYTE [DISTWIDTH] IN BLOOD BY AUTOMATED COUNT: 13 % (ref 12.3–15.4)
EXPIRATION DATE: NORMAL
FLUAV AG UPPER RESP QL IA.RAPID: NOT DETECTED
FLUBV AG UPPER RESP QL IA.RAPID: NOT DETECTED
GLOBULIN UR ELPH-MCNC: 2.7 GM/DL
GLUCOSE SERPL-MCNC: 92 MG/DL (ref 65–99)
GLUCOSE UR STRIP-MCNC: NEGATIVE MG/DL
HCT VFR BLD AUTO: 41.2 % (ref 34–46.6)
HCV AB SER DONR QL: NORMAL
HDLC SERPL-MCNC: 65 MG/DL (ref 40–60)
HGB BLD-MCNC: 13.5 G/DL (ref 12–15.9)
HGB UR QL STRIP.AUTO: ABNORMAL
IMM GRANULOCYTES # BLD AUTO: 0.01 10*3/MM3 (ref 0–0.05)
IMM GRANULOCYTES NFR BLD AUTO: 0.1 % (ref 0–0.5)
INTERNAL CONTROL: NORMAL
KETONES UR QL STRIP: NEGATIVE
LDLC SERPL CALC-MCNC: 113 MG/DL (ref 0–100)
LDLC/HDLC SERPL: 1.72 {RATIO}
LEUKOCYTE ESTERASE UR QL STRIP.AUTO: NEGATIVE
LYMPHOCYTES # BLD AUTO: 1.17 10*3/MM3 (ref 0.7–3.1)
LYMPHOCYTES NFR BLD AUTO: 15.2 % (ref 19.6–45.3)
Lab: NORMAL
MCH RBC QN AUTO: 31.3 PG (ref 26.6–33)
MCHC RBC AUTO-ENTMCNC: 32.8 G/DL (ref 31.5–35.7)
MCV RBC AUTO: 95.4 FL (ref 79–97)
MONOCYTES # BLD AUTO: 0.44 10*3/MM3 (ref 0.1–0.9)
MONOCYTES NFR BLD AUTO: 5.7 % (ref 5–12)
NEUTROPHILS NFR BLD AUTO: 6.07 10*3/MM3 (ref 1.7–7)
NEUTROPHILS NFR BLD AUTO: 78.8 % (ref 42.7–76)
NITRITE UR QL STRIP: NEGATIVE
PH UR STRIP.AUTO: 6 [PH] (ref 5–8)
PLATELET # BLD AUTO: 245 10*3/MM3 (ref 140–450)
PMV BLD AUTO: 10 FL (ref 6–12)
POTASSIUM SERPL-SCNC: 4 MMOL/L (ref 3.5–5.2)
PROT SERPL-MCNC: 6.8 G/DL (ref 6–8.5)
PROT UR QL STRIP: NEGATIVE
RBC # BLD AUTO: 4.32 10*6/MM3 (ref 3.77–5.28)
RBC # UR STRIP: NORMAL /HPF
REF LAB TEST METHOD: NORMAL
SARS-COV-2 AG UPPER RESP QL IA.RAPID: NOT DETECTED
SODIUM SERPL-SCNC: 138 MMOL/L (ref 136–145)
SP GR UR STRIP: 1.01 (ref 1–1.03)
SQUAMOUS #/AREA URNS HPF: NORMAL /HPF
TRIGL SERPL-MCNC: 70 MG/DL (ref 0–150)
TSH SERPL DL<=0.05 MIU/L-ACNC: 0.44 UIU/ML (ref 0.27–4.2)
UROBILINOGEN UR QL STRIP: ABNORMAL
VLDLC SERPL-MCNC: 13 MG/DL (ref 5–40)
WBC # UR STRIP: NORMAL /HPF
WBC NRBC COR # BLD: 7.71 10*3/MM3 (ref 3.4–10.8)

## 2022-11-30 PROCEDURE — 86803 HEPATITIS C AB TEST: CPT

## 2022-11-30 PROCEDURE — 80050 GENERAL HEALTH PANEL: CPT

## 2022-11-30 PROCEDURE — 36415 COLL VENOUS BLD VENIPUNCTURE: CPT

## 2022-11-30 PROCEDURE — 81001 URINALYSIS AUTO W/SCOPE: CPT

## 2022-11-30 PROCEDURE — 80061 LIPID PANEL: CPT

## 2022-11-30 PROCEDURE — 87428 SARSCOV & INF VIR A&B AG IA: CPT

## 2022-11-30 PROCEDURE — 87086 URINE CULTURE/COLONY COUNT: CPT

## 2022-11-30 PROCEDURE — 99213 OFFICE O/P EST LOW 20 MIN: CPT

## 2022-11-30 RX ORDER — ESCITALOPRAM OXALATE 20 MG/1
TABLET ORAL
Qty: 90 TABLET | Refills: 0 | Status: SHIPPED | OUTPATIENT
Start: 2022-11-30 | End: 2023-03-30

## 2022-12-01 LAB — BACTERIA SPEC AEROBE CULT: NO GROWTH

## 2022-12-17 ENCOUNTER — TELEMEDICINE (OUTPATIENT)
Dept: FAMILY MEDICINE CLINIC | Facility: TELEHEALTH | Age: 47
End: 2022-12-17

## 2022-12-17 DIAGNOSIS — J06.9 VIRAL URI: Primary | ICD-10-CM

## 2022-12-17 PROCEDURE — 99213 OFFICE O/P EST LOW 20 MIN: CPT | Performed by: NURSE PRACTITIONER

## 2022-12-17 RX ORDER — AZITHROMYCIN 250 MG/1
TABLET, FILM COATED ORAL
Qty: 6 TABLET | Refills: 0 | Status: SHIPPED | OUTPATIENT
Start: 2022-12-17

## 2022-12-17 RX ORDER — METHYLPREDNISOLONE 4 MG/1
TABLET ORAL
Qty: 21 TABLET | Refills: 0 | Status: SHIPPED | OUTPATIENT
Start: 2022-12-17

## 2022-12-17 NOTE — PATIENT INSTRUCTIONS
At this time I believe your symptoms are viral in nature.  Respiratory viruses can take 7 to 10 days to fully resolve.    I have sent in an antibiotic and steroid pack due to your previous history of respiratory infections, if symptoms do not improve or worsen you may take the these medications.    Continue over-the-counter medicine of choice for symptoms.    If symptoms worsen or do not improve follow up with your PCP or visit your nearest Urgent Care Center or ER.

## 2022-12-17 NOTE — PROGRESS NOTES
"Subjective   Chief Complaint   Patient presents with   • URI       Gabriela Riggs is a 46 y.o. female.     History of Present Illness  Patient reports a cold that started about 6 days ago.  She is feeling a little better but continues to have congestion and a cough that is productive with purulent sputum.  She has some burning in the chest occasionally.  She presents today wanting to know if symptoms are viral or if she may need additional treatment.  She is prone to bronchitis and does not want to worsening symptoms going into the Christmas holiday.  URI   This is a new problem. Episode onset: 6 days. The problem has been gradually improving. There has been no fever. Associated symptoms include congestion and coughing. Pertinent negatives include no abdominal pain, chest pain, nausea, sinus pain, sore throat, vomiting or wheezing. Treatments tried: saline nasal rinses. The treatment provided moderate relief.        No Known Allergies    Past Medical History:   Diagnosis Date   • Anxiety    • Atypical chest pain    • Bulging lumbar disc 2013   • Hashimoto's thyroiditis    • Palpitation        Past Surgical History:   Procedure Laterality Date   • CHOLECYSTECTOMY  2014   • SUBTOTAL HYSTERECTOMY  2000       Social History     Socioeconomic History   • Marital status:    Tobacco Use   • Smoking status: Every Day     Packs/day: 1.00     Years: 19.00     Pack years: 19.00     Types: Cigarettes     Start date: 2003   • Smokeless tobacco: Never   • Tobacco comments:     \"eventually\"- ready to quit smoking 4/20/2022   Vaping Use   • Vaping Use: Never used   Substance and Sexual Activity   • Alcohol use: No   • Drug use: Never   • Sexual activity: Defer       History reviewed. No pertinent family history.      Current Outpatient Medications:   •  albuterol sulfate  (90 Base) MCG/ACT inhaler, Inhale 2 puffs Every 6 (Six) Hours As Needed for Wheezing., Disp: 18 g, Rfl: 1  •  azithromycin (Zithromax Z-Chuck) 250 " MG tablet, Take 2 tablets by mouth on day 1, then 1 tablet daily on days 2-5, Disp: 6 tablet, Rfl: 0  •  escitalopram (LEXAPRO) 20 MG tablet, TAKE ONE TABLET BY MOUTH DAILY, Disp: 90 tablet, Rfl: 0  •  gabapentin (NEURONTIN) 100 MG capsule, Take 1 capsule by mouth 2 (Two) Times a Day., Disp: 60 capsule, Rfl: 2  •  methylPREDNISolone (MEDROL) 4 MG dose pack, Take as directed on package instructions., Disp: 21 tablet, Rfl: 0  •  omeprazole (priLOSEC) 40 MG capsule, Take 40 mg by mouth Daily., Disp: , Rfl:       Review of Systems   Constitutional: Negative for chills, diaphoresis, fatigue and fever.   HENT: Positive for congestion. Negative for sinus pressure and sore throat.    Respiratory: Positive for cough. Negative for chest tightness, shortness of breath and wheezing.    Cardiovascular: Negative for chest pain.   Gastrointestinal: Negative for abdominal distention, abdominal pain, nausea and vomiting.   Musculoskeletal: Negative for myalgias.   Neurological: Negative for headache.        There were no vitals filed for this visit.    Objective   Physical Exam  Constitutional:       General: She is not in acute distress.     Appearance: Normal appearance. She is not ill-appearing, toxic-appearing or diaphoretic.   HENT:      Head: Normocephalic.      Nose: Congestion present.      Right Sinus: No maxillary sinus tenderness or frontal sinus tenderness.      Left Sinus: No maxillary sinus tenderness or frontal sinus tenderness.      Comments: Per pt     Mouth/Throat:      Lips: Pink.      Mouth: Mucous membranes are moist.   Pulmonary:      Effort: Pulmonary effort is normal.   Neurological:      Mental Status: She is alert and oriented to person, place, and time.   Psychiatric:         Mood and Affect: Mood normal.         Behavior: Behavior normal.          Procedures     Assessment & Plan   Diagnoses and all orders for this visit:    1. Viral URI (Primary)  -     azithromycin (Zithromax Z-Chuck) 250 MG tablet; Take  2 tablets by mouth on day 1, then 1 tablet daily on days 2-5  Dispense: 6 tablet; Refill: 0  -     methylPREDNISolone (MEDROL) 4 MG dose pack; Take as directed on package instructions.  Dispense: 21 tablet; Refill: 0      At this time I believe your symptoms are viral in nature.  Respiratory viruses can take 7 to 10 days to fully resolve.    I have sent in an antibiotic and steroid pack due to your previous history of respiratory infections, if symptoms do not improve or worsen you may take the these medications.    Continue over-the-counter medicine of choice for symptoms.    If symptoms worsen or do not improve follow up with your PCP or visit your nearest Urgent Care Center or ER.      PLAN: Discussed dosing, side effects, recommended other symptomatic care.  Patient should follow up with primary care provider, Urgent Care or ER if symptoms worsen, fail to resolve or other symptoms need attention. Patient/family agree to the above.         JL Anderson     The use of a video visit has been reviewed with the patient and verbal informed consent has been obtained. Myself and Gabriela Riggs participated in this visit. The patient is located at [place] [id number]. I am located in [place], [place]. Mychart and Zoom were utilized.        This visit was performed via Telehealth.  This patient has been instructed to follow-up with their primary care provider if their symptoms worsen or the treatment provided does not resolve their illness.

## 2022-12-25 DIAGNOSIS — G89.29 CHRONIC BILATERAL LOW BACK PAIN WITH BILATERAL SCIATICA: ICD-10-CM

## 2022-12-25 DIAGNOSIS — M54.41 CHRONIC BILATERAL LOW BACK PAIN WITH BILATERAL SCIATICA: ICD-10-CM

## 2022-12-25 DIAGNOSIS — M54.42 CHRONIC BILATERAL LOW BACK PAIN WITH BILATERAL SCIATICA: ICD-10-CM

## 2022-12-27 RX ORDER — GABAPENTIN 100 MG/1
CAPSULE ORAL
Qty: 60 CAPSULE | Refills: 2 | Status: SHIPPED | OUTPATIENT
Start: 2022-12-27 | End: 2023-03-30

## 2023-02-06 ENCOUNTER — E-VISIT (OUTPATIENT)
Dept: FAMILY MEDICINE CLINIC | Facility: TELEHEALTH | Age: 48
End: 2023-02-06
Payer: COMMERCIAL

## 2023-02-06 PROCEDURE — BRIGHTMDVISIT: Performed by: NURSE PRACTITIONER

## 2023-02-06 NOTE — E-VISIT TREATED
Chief Complaint: Cold, flu, COVID, sinus, hay fever, or seasonal allergies   Patient introduction   Patient is 47-year-old female with cough, congestion, nasal discharge, itchy nose or sneezing, sore throat, voice hoarseness, and fatigue that started 3 to 5 days ago.   COVID-19 exposure, testing history, and vaccination status:    No known exposure to a person with a confirmed or suspected case of COVID-19.    No recent travel outside of their local community.    Has not been tested for COVID-19 since symptom onset.    Patient was prompted to take a self-test at the time of interview, but does not have a COVID-19 test kit.    Received 2 doses of the COVID-19 vaccine (Moderna, Moderna).    Received their most recent dose of the vaccine more than 14 days ago.   Risk factors for severe disease from COVID-19 infection:    BMI >= 25.    Smokes tobacco daily.    Unspecified autoimmune disorder.   Patient did not request an excuse note.   General presentation   Symptoms came on gradually.   Fever:    No fever.   Sinus and nasal symptoms:    Nasal discharge.    Itchy nose or sneezing.    Clear nasal drainage.    Nasal drainage is thick.    Postnasal drip.    1 to 3 episodes of antibiotic treatment for sinus infection in the last year.    Congestion with sinus pain or pressure on or around the upper teeth or jaw.    Patient first noticed sinus pain less than 5 days ago.    Sinus pain is worse with Valsalva.    No history of unhealed nasal septal ulcer/nasal wound.    No history of deviated septum or nasal polyps.   Throat symptoms:    Sore throat.    No known recent strep exposure.    Patient does not think they have strep.    Has discomfort when swallowing but can swallow liquids and solid foods.    Voice is mildly hoarse. Patient does not believe hoarseness is due to voice strain.   Head and body aches:    Fatigue.    No headache.    No sweats.    No chills.    No myalgia.   Cough:    Cough is worse in the morning.     Cough is mildly productive of sputum.    Describes color of sputum as green.   Wheezing and shortness of breath:    Has previously used an albuterol inhaler for URI, bronchitis, or pneumonia.    Not using an albuterol inhaler for current symptoms because they do not feel they need it.    No COPD diagnosis.    No asthma diagnosis.    No shortness of breath.    No previous steroid inhaler use for URIs, bronchitis, or pneumonia.   Chest pain:    No chest pain.   Ear symptoms:    Current symptoms include fullness in the ear(s).   Dizziness:    No dizziness.   Flu exposure:    No recent known exposure to a person with a confirmed flu diagnosis.    Has not had a flu vaccine this season.   Patient is taking over-the-counter medications for current symptoms, including acetaminophen, cetirizine, fluticasone, and ibuprofen.   Review of red flags/alarm symptoms:    No changes in alertness or awareness.    No symptoms suggesting airway obstruction.    No decreased urination.   Risk factors for antibiotic resistance:    Close contact with a child in .    Antibiotic use for similar symptoms within the last 30 days.    Smokes tobacco daily.   Pregnancy/menstrual status/breastfeeding:    Not pregnant.    Not breastfeeding.    Regarding last menstrual period, patient writes: I had hysterectomy in 2001.   Self-exam:    Height: 167 centimeters    Weight: 73.4 kilograms    No difficulty moving their chin toward their chest.    No tonsillar edema.    Tonsils appear normal.    Palatal petechiae.    Neck lymph nodes feel normal.   Recent antibiotic use:    Has taken antibiotics for similar symptoms within the past month. Patient specifies the antibiotics taken as Zoack.   Current medications   Currently taking albuterol sulfate  (90 Base) MCG/ACT inhaler, omeprazole 40 MG capsule, gabapentin 100 MG capsule, and escitalopram 20 MG tablet.   Medication allergies   No relevant drug allergies.   Medication contraindication  review   Patient has history of thyroid disease. Therefore, the following medication(s) will not be prescribed:    Pseudoephedrine.   No history of metoclopramide-associated dystonic reaction and tardive dyskinesia.   No known history of amoxicillin-clavulanate-associated cholestatic jaundice or hepatic impairment.   No known history of azithromycin-associated cholestatic jaundice or hepatic impairment.   Past medical history   Immune conditions: an unspecified autoimmune disorder. Patient is not currently taking medications for their condition(s). No history of cancer.   Social history   Smokes tobacco daily.   Assessment   Acute nasopharyngitis (common cold). Ruled out: Traumatic laryngitis.   Plan   Medications:    prednisone 20 mg tablet RX 20mg 2 tabs PO qd 5d for asthma exacerbation. Amount is 10 tab.    benzonatate 200 mg capsule RX 200mg 1 cap PO tid PRN 7d for cough. Do not chew or cut these capsules. Amount is 21 cap.    Mucus Relief  mg tablet, extended release RX 600mg 1 tab PO q12h PRN 7d for cough and congestion. Amount is 14 tab.    ipratropium bromide 42 mcg (0.06 %) nasal spray RX 0.06% 2 sprays nasal tid PRN 4d for nasal symptoms. Amount is 15 mL.   The patient's prescriptions will be sent to:   [facility] of [address] [id number]   Phone: [phone]     Fax: [phone]   Education:    Condition and causes    Prevention    Treatment and self-care    When to call provider   ----------   Electronically signed by JL Ruiz on 2023-02-06 at 11:46AM   ----------   Patient Interview Transcript:   Please carefully consider each question and answer as best you can. This helps your provider give you the best care. Which of these symptoms are bothering you? Select all that apply.    Cough    Stuffed-up nose or sinuses    Runny nose    Itchy nose or sneezing    Sore throat    Hoarse voice or loss of voice    Fatigue or tiredness   Not  "selected:    Shortness of breath    Fever    Itchy or watery eyes    Loss of smell or taste    Headache    Sweats    Chills    Muscle or body aches    Nausea or vomiting    Diarrhea    I don't have any of these symptoms   Since your current symptoms started, have you had a viral test for COVID-19? - This includes home self-tests as well as nose swab or saliva tests done at a doctor's office, lab, or testing site. - It does NOT include antibody tests, which use a blood sample to test for past infection. Select one.    No   Not selected:    Yes   Taking a home COVID test can help your provider give you the best care. If you have a COVID test kit at home, take the test now before continuing with this interview. Do you have a COVID test kit at home? Select one.    No, I don't have a test kit   Not selected:    Yes, and I'll take a test now    Yes, but I prefer not to take a test now   Have you gotten the COVID-19 vaccine? Select one.    Yes   Not selected:    No   How many total doses of the COVID-19 vaccine have you gotten? This includes boosters as well as additional doses for those who are immunocompromised. Select one.    2 doses   Not selected:    1 dose    3 doses    4 doses    5 doses   1st dose    Moderna   Not selected:    J&J/Mike    Novavax    Pfizer   2nd dose    Moderna   Not selected:    J&J/Mike    Novavax    Pfizer   When did you get your most recent dose of the COVID-19 vaccine?    More than 14 days ago   Not selected:    Less than 48 hours (2 days) ago    48 to 72 hours (3 days) ago    3 to 5 days ago    5 to 7 days ago    7 to 14 days ago   In the last 14 days, have you traveled outside of your local community? This includes travel by car, RV, bus, train, or plane. Travel increases your chances of getting and spreading COVID-19. Select one.    No   Not selected:    Yes   In the last 14 days, have you had close contact with someone who has coronavirus (COVID-19)? \"Close contact\" means any of " these: - Living in the same household as someone with COVID-19. - Caring for someone with COVID-19. - Being within 6 feet of someone with COVID-19 for a total of at least 15 minutes over a 24-hour period. For example, three 5-minute exposures for a total of 15 minutes. - Being in direct contact with respiratory droplets from someone with COVID-19 (being coughed on, kissing, sharing utensils). Select one.    No, not that I know of   Not selected:    Yes, a confirmed case    Yes, a suspected case   When did your current symptoms start? Select one.    3 to 5 days ago   Not selected:    Less than 48 hours ago    6 to 9 days ago    10 to 14 days ago    2 to 3 weeks ago    3 to 4 weeks ago    More than a month ago   Do you know the exact date your symptoms started? If so, enter the date as MM/DD/YY. Select one.    No   Not selected:    Yes (specify)   Did your symptoms come on suddenly or gradually? Select one.    Gradually   Not selected:    Suddenly    I'm not sure   Do you cough so hard that it's made you gag or vomit? By gag, we mean has your coughing made you choke or dry heave? Select all that apply.    Yes, my coughing has made me gag   Not selected:    Yes, my coughing has made me vomit    No   When is your cough the worst? Select all that apply.    In the morning, or when I wake up   Not selected:    During the day    At nighttime, or while I'm sleeping    I haven't noticed a difference depending on time of day   Are you coughing up mucus or phlegm? Select one.    Yes, a little   Not selected:    No, my cough is dry    Yes, a lot   What color is most of the mucus or phlegm that you're coughing up? Select one.    Green   Not selected:    Clear    White/frothy    Yellow    Red or pink    I'm not sure   You mentioned having a stuffy nose or sinus congestion. Do you feel pain or pressure in your sinuses?    Yes   Not selected:    No   Where do you feel sinus pain or pressure?    In my upper teeth or jaw   Not  "selected:    In my forehead    Around my eyes    Behind my nose    In my cheeks    I'm not sure   When did you first notice your sinus pain or pressure? Select one.    Less than 5 days ago   Not selected:    5 to 9 days ago    10 to 14 days ago    2 to 4 weeks ago    1 month ago or longer   Does coughing, sneezing, or leaning forward make your sinuses feel worse? Select one.    Yes   Not selected:    No   What color is your nasal drainage? Select one.    Clear   Not selected:    White    Yellow    Green    My nose is stuffed but not draining or running    I'm not sure   Is your nasal drainage thick or thin? Select one.    Thick   Not selected:    Thin   Is there any drainage (mucus) going down the back of your throat? This kind of drainage is also called \"postnasal drip.\" Select one.    Yes   Not selected:    No, not that I know of   Can you swallow liquids and solid foods? A sore throat may be painful when swallowing, but it shouldn't prevent you from swallowing. Select one.    Yes, but it's uncomfortable   Not selected:    Yes, with ease    Yes, but it's painful    It's hard to swallow anything because it feels like liquids and food get stuck in my throat    No, I can't swallow anything, liquid or solid foods   Since your symptoms started, have you felt dizzy? Select one.    No   Not selected:    Yes, but I can continue with my regular daily activities    Yes, and it makes it hard to stand, walk, or do daily activities   Do you have chest pain? You might also feel it as discomfort, aching, tightness, or squeezing in the chest. Select one.    No   Not selected:    Yes   Have you urinated at least 3 times in the last 24 hours? Select one.    Yes   Not selected:    No   Changes in alertness or awareness may mean you need emergency care. Since your symptoms started, have you had any of these? Select all that apply.    None of the above   Not selected:    Confusion    Slurred speech    Not knowing where you are or what " day it is    Difficulty staying conscious    Fainting or passing out   Do your symptoms include a whistling sound, or wheezing, when you breathe? Select one.    I'm not sure   Not selected:    Yes    No   Early in this interview, you told us you were hoarse or you'd lost your voice. How would you describe the changes to your voice? Select one.    It just sounds a little raspy   Not selected:    It's harder than usual to talk    I can barely talk at all   Is it possible that you strained your voice? Singing, yelling, or talking more or louder than usual can cause voice strain. Select one.    No, not that I know of   Not selected:    Yes   Do you have any of these symptoms in your ear(s)? Select all that apply.    Fullness   Not selected:    Pain    Pressure    Crackling or popping    Plugged or blocked sensation    None of the above   Can you move your chin toward your chest?    Yes   Not selected:    No, my neck is too stiff   Are your tonsils larger than usual?    No, not that I can tell   Not selected:    Yes    I've had my tonsils removed   Is there any white or yellow pus on your tonsils?    No, not that I can see   Not selected:    Yes   Are there red spots on the roof of your mouth or the back of your throat?    Yes   Not selected:    No, not that I can see   Are your glands/lymph nodes swollen, or does it hurt when you touch them?    No, not that I can tell   Not selected:    Yes   In the past 2 weeks, has anyone around you (such as at school, work, or home) had a confirmed diagnosis of strep throat? A confirmed diagnosis means that a throat swab and lab test were done to verify a strep throat infection. Select one.    No, not that I know of   Not selected:    Yes   Do you think you might have strep throat? Select one.    No   Not selected:    Yes   In the past week, has anyone around you (such as at school, work, or home) had a confirmed diagnosis of the flu? A confirmed diagnosis means that a nose swab was  done to verify a flu infection. Select all that apply.    No, not that I know of   Not selected:    I live with someone who has the flu    I've been within touching distance of someone who has the flu    I've walked by, or sat about 3 feet away from, someone who has the flu    I've been in the same building as someone who has the flu   Have you ever been diagnosed with asthma? Select one.    No   Not selected:    Yes   Have you ever been prescribed albuterol to use for wheezing, cough, or shortness of breath caused by a cold, bronchitis, or pneumonia? Albuterol (ProAir, Proventil, Ventolin) is prescribed as an inhaler or a solution to be used with a nebulizer machine. Select one.    Yes   Not selected:    No, not that I know of   Have you ever been prescribed a steroid inhaler to use for wheezing, cough, or shortness of breath caused by a cold, bronchitis, or pneumonia? Some examples of steroid inhalers include Pulmicort, Flovent, Qvar, and Alvesco. Select one.    No, not that I know of   Not selected:    Yes   Have you used albuterol for your current symptoms? This includes both albuterol inhalers and albuterol solution in a nebulizer machine. Select one.    No, I don't feel like I need it   Not selected:    Yes    No, I don't have any, or it's    Do you need a refill of albuterol? Select one.    No   Not selected:    Yes   Have you ever been diagnosed with chronic obstructive pulmonary disease (COPD)? Select one.    No, not that I know of   Not selected:    Yes   In the past month, have you taken antibiotics for similar symptoms? Examples of antibiotics include amoxicillin, amoxicillin-clavulanate (Augmentin), penicillin, cefdinir (Omnicef), doxycycline, and clindamycin (Cleocin). Select one.    Yes (specify): Artem   Not selected:    No   In the last year, how many times were you treated with antibiotics for a sinus infection? Select one.    1 to 3 times   Not selected:    None    4 or more times   Have  you been diagnosed with a deviated septum or nasal polyps? The nose is divided into two nostrils by the septum. A crooked septum is called a deviated septum. Nasal polyps are growths inside the nose or sinuses. Select one.    No, not that I know of   Not selected:    Yes, but I had surgery to treat them    Yes, I have a deviated septum    Yes, I have nasal polyps    Yes, I have a deviated septum and nasal polyps   Do you have a sore inside your nose that won't heal? Select one.    No, not that I know of   Not selected:    Yes   Do you have allergies (pollen, dust mites, mold, animal dander)? Select one.    Yes   Not selected:    No, not that I know of   What kind of allergies do you have? Select all that apply.    I'm not sure   Not selected:    Seasonal allergies (hay fever)    Pet allergies    Dust allergies    None of the above   Do you think your symptoms could be allergy-related? Select one.    I'm not sure   Not selected:    Yes    No   Have you had a flu shot this season? Select one.    No   Not selected:    Yes, less than 2 weeks ago    Yes, 2 to 4 weeks ago    Yes, 1 to 3 months ago    Yes, 3 to 6 months ago    Yes, more than 6 months ago   Have you gone through menopause? Select one.    No   Not selected:    Yes    I'm going through it now   Are you pregnant? Select one.    No   Not selected:    Yes   When was your last menstrual period? If you don't currently have periods or no longer have periods, please briefly explain.    I had hysterectomy in    Within the last 2 weeks, have you: - Given birth - Had a miscarriage - Had a pregnancy loss - Had an  Being postpartum (live birth or loss) within the last 2 weeks increases your risk of flu complications. Select one.    No   Not selected:    Yes   Are you breastfeeding? Select one.    No   Not selected:    Yes   The flu and COVID-19 can be more serious for people with certain conditions or characteristics. These questions help us figure out if you  or anyone you live with is at higher risk for complications from these infections. Do either of these statements apply to you? Select all that apply.    None of the above   Not selected:    I'm  or Native [place]    I'm a healthcare worker   Do you smoke tobacco? Select one.    Yes, every day   Not selected:    Yes, some days    No, I quit    No   Do you have any of these conditions? Select all that apply.    None of the above   Not selected:    Chronic lung disease, such as cystic fibrosis or interstitial fibrosis    Heart disease, such as congenital heart disease, congestive heart failure, or coronary artery disease    Disorder of the brain, spinal cord, or nerves and muscles, such as dementia, cerebral palsy, epilepsy, muscular dystrophy, or developmental delay    Metabolic disorder or mitochondrial disease    Cerebrovascular disease, such as stroke or another condition affecting the blood vessels or blood supply to the brain    Down syndrome    Mood disorder, including depression or schizophrenia spectrum disorders    Substance use disorder, such as alcohol, opioid, or cocaine use disorder    Tuberculosis   Do you live in a group care setting? Examples include: - Nursing home - Residential care - Psychiatric treatment facility - Group home - [facility] - [facility] and care home - Homeless shelter - Foster care setting Select one.    No   Not selected:    Yes   Are you a healthcare worker? Select one.    No   Not selected:    Yes   People with a very high body mass index (BMI) are at higher risk for developing complications from the flu and severe illness from COVID-19. To determine your BMI, we need to know your weight and height. Please enter your weight (in pounds).    Weight   Please enter your height.    Height   Do you have any of these conditions that can affect the immune system? Scroll to see all options. Select all that apply.    An autoimmune disorder not listed here   Not selected:     History of bone marrow transplant    Chronic kidney disease    Chronic liver disease (including cirrhosis)    HIV/AIDS    Inflammatory bowel disease (Crohn's disease or ulcerative colitis)    Lupus    Moderate to severe plaque psoriasis    Multiple sclerosis    Rheumatoid arthritis    Sickle cell anemia    Alpha or beta thalassemia    History of solid organ transplant (kidney, liver, or heart)    History of spleen removal    A condition requiring treatment with long-term use of oral steroids (such as prednisone, prednisolone, or dexamethasone)    None of these   Do you take medications for your condition? This includes oral and injectable medications that are taken daily, weekly, or monthly. Select one.    No   Not selected:    Yes, regularly    Yes, for flare-ups only   Have you ever been diagnosed with cancer? Select one.    No   Not selected:    Yes, I have cancer now    Yes, but I'm in remission   Do any of these apply to you? Select all that apply.    I'm in close contact with a child in    Not selected:    I've been hospitalized within the last 5 days    I have diabetes    None of the above   Do any of these apply to the people who live with you? Select all that apply.    None of the above   Not selected:    A child under the age of 5    An adult 65 or older    A person who is pregnant    A person who has given birth, had a miscarriage, had a pregnancy loss, or had an  in the last 2 weeks    An  or Native [place]   Does any member of your household have any of these medical conditions? Select all that apply.    None of the above   Not selected:    Asthma    Disorders of the brain, spinal cord, or nerves and muscles, such as dementia, cerebral palsy, epilepsy, muscular dystrophy, or developmental delay    Chronic lung disease, such as COPD or cystic fibrosis    Heart disease, such as congenital heart disease, congestive heart failure, or coronary artery disease    Cerebrovascular  disease, such as stroke or another condition affecting the blood vessels or blood supply to the brain    Blood disorders, such as sickle cell disease    Diabetes    Metabolic disorders such as inherited metabolic disorders or mitochondrial disease    Kidney disorders    Liver disorders    Weakened immune system due to illness or medications such as chemotherapy or steroids    Children under the age of 19 who are on long-term aspirin therapy    Extreme obesity (BMI > 40)   Do you have any of these conditions? Scroll to see all options. Select all that apply.    Thyroid disease   Not selected:    Aspirin triad (also known as Samter's triad or ASA triad)    Asthma or hives from taking aspirin or other NSAIDs, such as ibuprofen or naproxen    Blockage or narrowing of the blood vessels of the heart    Blood clotting disorder    Blood dyscrasia, such anemia, leukemia, lymphoma, or myeloma    Bone marrow depression    Catecholamine-releasing paraganglioma    Congenital long QT syndrome    Depression    Difficulty urinating or completely emptying your bladder    Uncorrected electrolyte abnormalities    Fungal infection    Gastrointestinal (GI) bleeding    Gastrointestinal (GI) obstruction    G6PD deficiency    Recent heart attack    High blood pressure    Irregular heartbeat or heart rhythm    Mononucleosis (mono)    Myasthenia gravis    Parkinson's disease    Pheochromocytoma    Reye syndrome    Seizure disorder    Ulcerative colitis    None of the above   Have you ever had either of these conditions? Select all that apply.    No   Not selected:    Metoclopramide-associated dystonic reaction    Tardive dyskinesia   Just a few more questions about medications, and then you're finished. Have you used any non-prescription medications or nasal sprays for your current symptoms? Examples include saline sprays, decongestants, NyQuil, and Tylenol. Select one.    Yes   Not selected:    No   Which of these non-prescription  medications have you tried? Scroll to see all options. Select all that apply.    Acetaminophen (Tylenol)    Cetirizine (Zyrtec)    Fluticasone (Flonase)    Ibuprofen (Advil, Motrin, Midol)   Not selected:    Budesonide (Rhinocort)    Chlorpheniramine (Aller-chlor, Chlor-Trimeton)    Cromolyn (NasalCrom)    Dextromethorphan (Delsym, Robitussin, Vicks DayQuil Cough)    Diphenhydramine (Benadryl)    Fexofenadine (Allegra)    Guaifenesin (Mucinex)    Guaifenesin/dextromethorphan (Delsym DM, Mucinex DM, Robitussin DM)    Ketotifen (Alaway, Zaditor)    Loratadine (Alavert, Claritin)    Naphazoline-pheniramine (Naphcon-A, Opcon-A, Visine-A)    Omeprazole (Prilosec)    Oxymetazoline (Afrin)    Phenylephrine (Sudafed PE)    Triamcinolone (Nasacort)    None of the above   Have you taken any monoamine oxidase inhibitor (MAOI) medications in the last 14 days? Examples include rasagiline (Azilect), selegiline (Eldepryl, Zelapar), isocarboxazid (Marplan), phenelzine (Nardil), and tranylcypromine (Parnate). Select one.    No, not that I know of   Not selected:    Yes   Do you take Kynmobi or Apokyn (apomorphine)? Select one.    No   Not selected:    Yes   Are you still taking these medications listed in your medical record? If you're not taking any of these, click Next. Select all that apply.    albuterol sulfate  (90 Base) MCG/ACT inhaler    omeprazole 40 MG capsule    gabapentin 100 MG capsule    escitalopram 20 MG tablet   Are you taking any other medications, vitamins, or supplements? Select one.    No   Not selected:    Yes   Have you ever had an allergic or bad reaction to any medication? Select one.    Yes   Not selected:    No   Have you had an allergic or bad reaction to any of these medications? Select all that apply.    No, not that I know of   Not selected:    Baloxavir (Xofluza)    Benzonatate (Tessalon Perles)    Fluconazole, itraconazole, or terconazole (brands include Diflucan, Sporanox, Terazol)    " Oseltamivir (Tamiflu) or zanamivir (Relenza)    Paxlovid, nirmatrelvir, or ritonavir (Norvir)   Have you had an allergic or bad reaction to any of these antibiotic medications? Select all that apply.    No, not that I know of   Not selected:    Penicillin or any \"-cillin\" antibiotic, such as amoxicillin, ampicillin, dicloxacillin, nafcillin, or piperacillin (Brands include Augmentin, Unasyn, and Zosyn)    Tetracycline or any \"-cycline\" antibiotic, such as doxycycline, demeclocycline, minocycline (Brands include Declomycin, Doryx, Dynacin, Oracea, Monodox, Panmycin, and Vibramycin)    Ciprofloxacin or any \"-floxacin\" antibiotic, such as gemifloxacin, levofloxacin, moxifloxacin, or ofloxacin (Brands include Factive, Cipro, Floxin, and Levaquin)    Cephalexin or any \"cef-\" antibiotic, such as cefazolin, cefdinir, cefuroxime, ceftriaxone, ceftazidime, or cefepime (Brands include Ancef, Ceftin, Fortaz, Keflex, Maxipime, Rocephin, and Simplicef)    Azithromycin or any \"-thromycin\" antibiotic, such as erythromycin or clarithromycin (Brands include Biaxin, Erythrocin, Z-mario, and Zithromax)    Clindamycin or lincomycin (Brands include Cleocin and Lincocin)   Have you had an allergic or bad reaction to any of these medications? Select all that apply.    No, not that I know of   Not selected:    Albuterol or a similar medication    Atropine    Corticosteroid (steroid) medication, including topical steroids, inhaled steroids, nasal steroids, or oral steroids (budesonide, ciclesonide, dexamethasone, flunisolide, fluticasone, methylprednisolone, triamcinolone, prednisone (or brand names Alvesco, Deltasone, Flovent, Medrol, Nasacort, Rhinocort, or Veramyst)    Metoclopramide (Reglan)    Ondansetron (Zuplenz, Zofran ODT, Zofran)    Prochlorperazine (Compazine)   Have you had an allergic or bad reaction to any of these eye drops, nasal sprays, or inhalers? Scroll to see all options. Select all that apply.    No, not that I know " of   Not selected:    Azelastine (Astelin, Astepro, Optivar)    Cromolyn (Crolom, NasalCrom)    Ipratropium (Atrovent)    Ketotifen (Alaway, Zaditor)    Pheniramine/naphazoline (Naphcon-A, Opcon-A, Visine-A)    Olopatadine (Pataday, Patanol, Pazeo)   Have you had an allergic or bad reaction to any of these non-prescription medications? Scroll to see all options. Select all that apply.    No, not that I know of   Not selected:    Acetaminophen (Tylenol)    Aspirin    Cetirizine (Zyrtec)    Dextromethorphan (Delsym, Robitussin, Vicks DayQuil Cough)    Diphenhydramine (Benadryl)    Fexofenadine (Allegra)    Guaifenesin (Mucinex)    Dextromethorphan (Delsym)    Ibuprofen (Advil, Motrin, Midol)    Loratadine (Alavert, Claritin)    Oxymetazoline (Afrin)    Phenylephrine (Sudafed PE)    Pseudoephedrine (Sudafed)   Are you allergic to milk or to the proteins found in milk (for example, whey or casein)? A milk allergy is different from lactose intolerance. Select one.    No, not that I know of   Not selected:    Yes   Have you ever had jaundice or liver problems as a result of taking amoxicillin-clavulanate (Augmentin)? Jaundice is a condition in which the skin and the whites of the eyes turn yellow. Select all that apply.    No, not that I know of   Not selected:    Yes, jaundice    Yes, liver problems   Have you ever had jaundice or liver problems as a result of taking azithromycin (Zithromax, Zmax)? Jaundice is a condition in which the skin and the whites of the eyes turn yellow. Select all that apply.    No, not that I know of   Not selected:    Yes, jaundice    Yes, liver problems   Do you need a doctor's note? A doctor's note confirms that you received care today and states when you can return to school or work. It does not contain information about your diagnosis or treatment plan. Your provider will make the final decision on whether to give you a doctor's note and for how long. Doctor's notes CANNOT be backdated. We  can't provide medical leave paperwork through this type of visit. If more paperwork is needed to request time off, contact your primary care provider. Select one.    No   Not selected:    Today only (1 day)    Today and tomorrow (2 days)    3 days    5 days    7 days    10 days    14 days   Is there anything else you'd like to tell us about your symptoms?   The patient did not enter any additional information.   ----------   Medical history   Medical history data does not currently exist for this patient.

## 2023-02-06 NOTE — EXTERNAL PATIENT INSTRUCTIONS
"   Note   Bandar Ochoa, I am sending in some medication for a common cold. I am also sending in steroids, but I cannot edit the e-visit system where it states \"for asthma exacerbation\". Please ignore that message. The steroids will help to decrease inflammation. I see that you had a zpak about one month ago, so we do not like to prescribe antibiotics unless symptoms have been present at least 10 days for a sinus infection. I hope you feel better soon. -Manuela   Diagnosis   Common cold   My name is Manuela Salvador, and I'm a healthcare provider at [facility]. I reviewed your interview, and I see that you may have a cold.   With the ongoing COVID-19 pandemic, it can be hard to tell the difference between the common cold and a COVID-19 infection. Many cold symptoms are the same as COVID-19 symptoms. Most people with either illness have mild to moderate symptoms and can rest at home until they get better.   To prevent the spread of illness to others, I recommend that you stay home and away from other people as much as possible while you're sick. When you need to be around other people, consider wearing a face mask.   Here are the main things to know about your current symptoms:    Colds get better on their own.    You can use the medications recommended here to help ease your symptoms.   Based on what you told me in your interview, I haven't prescribed antibiotics. Antibiotics fight bacteria, not viruses. They don't help when you have a viral infection like the common cold. Antibiotics could even make you feel worse as they can cause or worsen nausea, diarrhea, and stomach pain. The following factors suggest that a virus is causing your symptoms:    You've had symptoms for less than 10 days. A bacterial infection is suspected when you've had symptoms longer than 10 days without improvement.    You haven't had a fever. Bacterial infections can cause a high fever.    Your nasal discharge is clear or white.    In " addition to your sore throat, you have other cold symptoms like a stuffy nose or cough. This suggests a viral infection, rather than a bacterial infection such as strep throat.   Medications   Your pharmacy   [facility] [place] [id number] [id number] [phone]     Prescription   Prednisone (20mg): Take 2 tablets (40mg) by mouth once daily for 5 days for asthma exacerbation.   Benzonatate (200mg): Take 1 capsule by mouth 3 times a day as needed for cough. Do not chew or cut these capsules.   Mucus Relief ER oral tablet, extended release (600mg): Take 1 tablet by mouth every 12 hours as needed for cough and congestion.   Ipratropium bromide nasal spray (0.03%): Spray 2 sprays in each nostril 3 times a day as needed for nasal symptoms.    I've prescribed a 5-day course of oral steroids to help with your asthma flare. If your symptoms don't start to improve after 2 to 3 doses, or if they get worse, follow up with your care team right away. Continue taking all of your other asthma medications.   About your diagnosis   The common cold is a viral infection of the respiratory tract, which includes the nose, throat, breathing passages, and lungs. These are the key things to know about colds:    There is no vaccine to prevent colds and no cure for a cold.    Some medications can lessen your symptoms.    You can spread a cold to other people.    Over 200 different viruses can cause the common cold. That's why you can get another cold after you've just had one.   Common symptoms include low-grade fever, sneezing, runny nose, congestion, sore throat, and cough. You may also notice fatigue, body aches, difficulty sleeping, or decreased appetite.   What to expect   You should feel better within 5 to 7 days and be back to normal within 14 days.   When to seek care   Call us at [phone]   with any sudden or unexpected symptoms.    Fever that measures over 103F or continues for more than 3  days.    A worsening headache.    Your throat pain worsens and makes swallowing extremely difficult or impossible.    Your hoarse voice or loss of voice lasts longer than 2 weeks.    Your sinus pain continues for 10 days or more, without improvement.    More than 5 episodes of diarrhea in a day.    More than 5 episodes of vomiting in a day.    Coughing up red or bloody mucus.    Severe shortness of breath.    Severe stomach pain.    Symptoms that get better for a few days, and then suddenly get worse.    Severe chest pain    Your treatment plan isn't working. Your immune condition may mean that you need more care.   Other treatment    Rest! Your body needs rest to recover and fight the virus.    Drink plenty of water to stay hydrated.    Use a clean humidifier or a cool-mist vaporizer in your room at night. Breathing humid air may help with nasal congestion and cough.    Try non-prescription saline nasal sprays to help your nasal symptoms.    Try using a Neti Pot to flush out your stuffy nose and sinuses. Neti Pots are available at any drugstore without a prescription.    Gargle with salt water several times a day to help your throat feel better. Cough drops and throat lozenges may provide extra relief. A teaspoon of honey stirred into warm water or weak tea can help soothe a sore throat and cough.    Avoid smoke and air pollution. Smoke can make infections worse. I encourage you to quit smoking, even for a week or two. Quitting will help your symptoms improve faster.   Prevention    Avoid close contact with other people when you're sick.    Cover your mouth and nose when you cough or sneeze. Use a tissue or cough into your elbow. Make sure that used tissues go directly into the trash.    Avoid touching your eyes, nose, or mouth while you're sick.    Wash your hands often, especially after coughing, sneezing, or blowing your nose. If soap and water are not available, use an alcohol-based hand .    If you or  someone in your home or workplace is sick, disinfect commonly used items. This includes door handles, tables, computers, remotes, and pens.    Coronavirus (COVID-19) information   Common symptoms of COVID-19 include fever, cough, shortness of breath, fatigue, muscle or body aches, headaches, new loss of sense of taste or smell, sore throat, stuffy or runny nose, nausea or vomiting, and diarrhea. Most people who get COVID-19 have mild symptoms and can rest at home until they get better. Elderly people and those with chronic medical problems may be at risk for more serious complications.   FAQs about the COVID-19 vaccine   There are four authorized COVID-19 vaccines: [employer] Mike Vaccine (J&J/Mike), Moderna, Novavax, and Pfizer-BioNTech (Pfizer). The J&J/Mike and Novavax vaccines are approved for use in people aged 18 and older. The Moderna and Pfizer vaccines are approved for those aged 6 months and older. All four are available at no cost. Even if you don't have health insurance, you can still get the COVID-19 vaccine for free.   Which vaccine is the best? Which vaccine should I get?   All four vaccines are highly effective. Even if you get COVID-19 after being vaccinated, all of the vaccines help prevent severe disease, hospitalization, and complications.   Most people should get whichever vaccine is first available to them. However, women younger than 50 years old should consider the rare risk of blood clots with low platelets after vaccination with the J&J/Mike vaccine. This risk hasn't been seen with the other three vaccines.   Are the vaccines safe?   Yes. Hundreds of millions of people in the [place] have already safely received COVID-19 vaccines under the most intense safety monitoring in the history of the [place].   Do I need the vaccine if I've already had COVID?   Yes. Vaccination helps protect you even if you've already had COVID.   If you had COVID-19 and had symptoms, wait to get  vaccinated until you've recovered and completed your isolation period.   If you tested positive for COVID-19 but did not have symptoms, you can get vaccinated after 5 full days have passed since you had a positive test, as long as you don't develop symptoms.   How many doses of the vaccine do I need?   Visit www.cdc.gov/coronavirus/2019-ncov/vaccines/stay-up-to-date.html   to find out how to stay up to date with your COVID-19 vaccines.   I'm immunocompromised. How many doses of the vaccine do I need?   For information on how immunocompromised people can stay up to date with their COVID-19 vaccines, visit www.cdc.gov/coronavirus/2019-ncov/vaccines/recommendations/immuno.html  .   What are the common side effects of the vaccine?   A sore arm, tiredness, headache, and muscle pain may occur within two days of getting the vaccine and last a day or two. For the Moderna or Pfizer vaccines, side effects are more common after the second dose. People over the age of 55 are less likely to have side effects than younger people.   After I'm up to date on vaccines, can I still get or spread COVID?   Yes, you can still get COVID, but your disease should be milder. And your risk of serious illness, hospitalization, and complications will be much lower, especially if you're up to date. Unfortunately, you can still spread COVID if you've been vaccinated. That's why it's important to follow isolation guidelines if you get sick or test positive.   After I'm up to date on vaccines, can I go back to normal?   You should still wear a mask indoors in public if:    It's required by laws, rules, regulations, or local guidance.    You have a weakened immune system.    Your age puts you at increased risk of severe disease.    You have a medical condition that puts you at increased risk of severe disease.    Someone in your household has a weakened immune system, is at increased risk for severe disease, or is unvaccinated.    You're in an area  of high transmission.   Where can I get a COVID-19 vaccine?   Visit [facility]'s website for more information. To find a COVID-19 vaccination site near you, visit www.vaccines.gov/  , call [phone]  , or text your zip code to 155450 (Apportable). Message and data rates may apply.   What about travel?   Travel increases your risk of exposure to COVID-19. For more information, see www.cdc.gov/coronavirus/2019-ncov/travelers/index.html  .   I've had close contact with someone who has COVID. Do I need to quarantine, and if so, for how long?   For the most current answer, including a calculator to determine whether you need to stay home and for how long, visit www.cdc.gov/coronavirus/2019-ncov/your-health/quarantine-isolation.html  .   I've tested positive for COVID. How long do I need to isolate?   For the latest recommendations, including a calculator to determine how long you need to stay home, visit www.cdc.gov/coronavirus/2019-ncov/your-health/quarantine-isolation.html  .   What if I develop symptoms that might be from COVID?   For the latest recommendations on what to do if you're sick, including when to seek emergency care, visit www.cdc.gov/coronavirus/2019-ncov/if-you-are-sick/steps-when-sick.html  .    Flu vaccine information   Who should get a flu vaccine?   Everyone 6 months of age and older should get a yearly flu vaccine.   When should I get vaccinated?   You should get a flu vaccine by the end of October. Once you're vaccinated, it takes about two weeks for antibodies to develop and protect you against the flu. That's why it's important to get vaccinated as soon as possible.   After October, is it too late to get vaccinated?   No. You should still get vaccinated. As long as the flu viruses are still in your community, flu vaccines will remain available, even into January of next year or later.   Why do I need a flu vaccine EVERY year?   Flu viruses are constantly changing, so flu vaccines are usually  updated from one season to the next. Your protection from the flu vaccine also lessens over time.   Is the flu vaccine safe?   Yes. Over the last 50 years, hundreds of millions of Americans have safely received the flu vaccines.   What are the side effects of flu vaccines?   You CANNOT get the flu from a flu vaccine. Common side effects of the flu shot include soreness, redness and/or swelling where the shot was given, low grade fever, and aches. Common side effects of the nasal spray flu vaccine for adults include runny nose, headaches, sore throat, and cough. For children, side effects include wheezing, vomiting, muscle aches, and fever.   Does the flu vaccine increase your risk of getting COVID-19?   No. There is no evidence that getting a flu vaccine increases your risk of getting COVID-19.   Is it safe to get the flu vaccine along with a COVID-19 vaccine?   Yes. It's safe to get the flu vaccine with a COVID-19 vaccine or booster.   Contact your healthcare provider TODAY for details on when and where to get your flu vaccine.   Your provider   Your diagnosis was provided by Manuela Salvador, a member of your trusted care team at [facility].   If you have any questions, call us at [phone]  .

## 2023-02-27 ENCOUNTER — PATIENT MESSAGE (OUTPATIENT)
Dept: FAMILY MEDICINE CLINIC | Age: 48
End: 2023-02-27
Payer: COMMERCIAL

## 2023-03-01 RX ORDER — MONTELUKAST SODIUM 10 MG/1
10 TABLET ORAL NIGHTLY
Qty: 90 TABLET | Refills: 1 | Status: SHIPPED | OUTPATIENT
Start: 2023-03-01

## 2023-03-01 NOTE — TELEPHONE ENCOUNTER
From: Gabriela Riggs  To: Candis Carey  Sent: 2/27/2023 1:37 PM EST  Subject: Allergy Med    Hi Dr Carey    I have had a couple of rounds of colds. One in December and one at end of January. I take Zyrtec everyday and switch to Allegra every once in a while. My allergies are kicking my butt. These two and Claritin don’t seem to be working as well. Years ago I took singular and did well with that. Would that be an option now for me?

## 2023-03-30 DIAGNOSIS — F41.9 ANXIETY: ICD-10-CM

## 2023-03-30 DIAGNOSIS — G89.29 CHRONIC BILATERAL LOW BACK PAIN WITH BILATERAL SCIATICA: ICD-10-CM

## 2023-03-30 DIAGNOSIS — M54.42 CHRONIC BILATERAL LOW BACK PAIN WITH BILATERAL SCIATICA: ICD-10-CM

## 2023-03-30 DIAGNOSIS — M54.41 CHRONIC BILATERAL LOW BACK PAIN WITH BILATERAL SCIATICA: ICD-10-CM

## 2023-03-30 RX ORDER — GABAPENTIN 100 MG/1
100 CAPSULE ORAL 2 TIMES DAILY
Qty: 60 CAPSULE | Refills: 0 | Status: SHIPPED | OUTPATIENT
Start: 2023-03-30

## 2023-03-30 RX ORDER — ESCITALOPRAM OXALATE 20 MG/1
TABLET ORAL
Qty: 90 TABLET | Refills: 1 | Status: SHIPPED | OUTPATIENT
Start: 2023-03-30

## 2023-04-03 ENCOUNTER — CLINICAL SUPPORT (OUTPATIENT)
Dept: FAMILY MEDICINE CLINIC | Age: 48
End: 2023-04-03
Payer: COMMERCIAL

## 2023-04-03 DIAGNOSIS — Z79.899 HIGH RISK MEDICATION USE: Primary | ICD-10-CM

## 2023-04-03 PROCEDURE — 80305 DRUG TEST PRSMV DIR OPT OBS: CPT | Performed by: FAMILY MEDICINE

## 2023-04-03 NOTE — PROGRESS NOTES
Gabriela RiggsLili 1975     Office/Outpatient Visit    Visit Date: Sat, Aug 31, 2019 11:52 am    Provider: Merle Brunson N.P. (Assistant: Nilam Estrada MA)    Location: [facility]        Electronically signed by Merle Brunson N.P. on  09/02/2019 01:49:21 PM                             SUBJECTIVE:        CC:     Mrs. Riggs is a 43 year old White female.  discuss meds; PT STATES SHE ISNT TAKING GABAPENTIN         HPI:         Tachycardia. Pt states starting prozac on Aug 2oth. Started having heart racing x 10 days ago. States went to the ER.  6 days ago. States they put her on a holter with suspected SVT. Cardiology was recommended. States  last tues having more episodes. States seeing Dr. Miguel last wed. He put her on metoprolol. She did not start taking due to reading about it and worried about her blood pressure being low. States last thurs she had another episode. She received a call from Dr. Miguel . She was told to take med at that time. She did and went home and slept. States it wears her out when this happens.  States not having any more episodes, however, she is feeling dizzy and tired.     ROS:     CONSTITUTIONAL:  Negative for chills, fatigue, fever, and weight change.      EYES:  Negative for blurred vision.      CARDIOVASCULAR:  Negative for chest pain, orthopnea, paroxysmal nocturnal dyspnea and pedal edema.      RESPIRATORY:  Negative for dyspnea.      GASTROINTESTINAL:  Negative for abdominal pain, constipation, diarrhea, nausea and vomiting.      NEUROLOGICAL:  Negative for dizziness, headaches, paresthesias, and weakness.      PSYCHIATRIC:  Negative for anxiety, depression, and sleep disturbances.          PMH/FMH/SH:     Last Reviewed on 8/31/2019 12:02 PM by Merle Brunson    Past Medical History:                 PAST MEDICAL HISTORY         Hypothyroidism         PREVENTIVE HEALTH MAINTENANCE             COLORECTAL CANCER SCREENING: Up to date (colonoscopy  "q10y; sigmoidoscopy q5y; Cologuard q3y) was last done 7/18/2019, Results are in chart; colonoscopy with normal results     MAMMOGRAM: Done within last 2 years and results in are chart was last done 6/20/2018 with normal results     PAP SMEAR: was last done s/p total hysterectomy         Surgical History:         Cholecystectomy: 2014;     Hysterectomy: 2000; uterus only;     Tubal Ligation: at age in her 20s;      L breast lumpectomy - benign;         Family History:     Father: Type 1 Diabetes     Mother: Healthy     Maternal Grandmother: \"thyroid issues and nodules\"         Social History:     Occupation:  at [employer];     Marital Status:      Children: 2 children         Tobacco/Alcohol/Supplements:     Last Reviewed on 8/31/2019 12:02 PM by Merle Brunson    Tobacco: Current Smoker: She currently smokes every day, 1 pack per day; (start age 18 pack-year history).          Substance Abuse History:     Last Reviewed on 8/31/2019 12:02 PM by Merle Brunson        Mental Health History:     Last Reviewed on 8/31/2019 12:02 PM by Merle Brunson        Communicable Diseases (eg STDs):     Last Reviewed on 8/31/2019 12:02 PM by Merle Brunson            Current Problems:     Last Reviewed on 8/31/2019 12:02 PM by Merle Brunson    Abdominal pain, unspecified     Fatigue     Use of high risk medications     Chronic low back pain     Screening for lipoid disorders     Hypothyroidism     Allergies     Anxiety, NOS         Immunizations:     Fluzone (3 + years dose) 10/1/2016     Fluzone (3 + years dose) 11/1/2017     Fluzone Quadrivalent (3+ years) 12/17/2018         Allergies:     Last Reviewed on 8/31/2019 12:02 PM by Merle Brunson      No Known Drug Allergies.         Current Medications:     Last Reviewed on 8/31/2019 12:02 PM by Merle Brunson    Gabapentin 100mg Capsules Give 2 capsules by mouth 3 times daily     Ventolin HFA 90mcg/1actuation Oral Inhaler Inhale 2 " puff(s) by mouth 4 times a day as needed     Synthroid 0.025mg Tablet Take 1 tablet(s) by mouth daily     Fluoxetine 20mg Tablet 1 tab daily     Flonase Allergy Relief 50mcg/1actuation Nasal Spray 1 spray each nostril bid prn     Fexofenadine HCl 180mg Tablet 1 tab daily     Metoprolol Succinate 25mg Tablets, Extended Release take 1/2 tab at bedtime         OBJECTIVE:        Vitals:         Current: 8/31/2019 12:00:05 PM    Ht:  5 ft, 5 in;  Wt: 149.2 lbs;  BMI: 24.8    T: 97.7 F (oral);  BP: 114/79 mm Hg (left arm, sitting);  P: 75 bpm (left arm (BP Cuff), sitting);  sCr: 0.7 mg/dL;  GFR: 101.24        Exams:     PHYSICAL EXAM:     GENERAL: vital signs recorded - well developed, well nourished;  no apparent distress;     EYES: extraocular movements intact; conjunctiva and cornea are normal; PERRLA;     NECK: range of motion is normal; thyroid is non-palpable;     RESPIRATORY: normal respiratory rate and pattern with no distress; normal breath sounds with no rales, rhonchi, wheezes or rubs;     CARDIOVASCULAR: normal rate; rhythm is regular;  no systolic murmur; no edema;     GASTROINTESTINAL: nontender; normal bowel sounds; no organomegaly;     NEUROLOGICAL:  cranial nerves, motor and sensory function, reflexes, gait and coordination are all intact;     PSYCHIATRIC:  appropriate affect and demeanor; normal speech pattern; grossly normal memory;         ASSESSMENT:           785.0   R00.0  Tachycardia, NOS              DDx:         ORDERS:         Meds Prescribed:       Bystolic (Nebivolol) 2.5mg Tablet Take 1 tablet(s) by mouth daily  #30 (Thirty) tablet(s) Refills: 0                 PLAN:          Tachycardia, NOS         FOLLOW-UP: Advised to call if there is no improvement..            Prescriptions:       Bystolic (Nebivolol) 2.5mg Tablet Take 1 tablet(s) by mouth daily  #30 (Thirty) tablet(s) Refills: 0             Patient Recommendations:        For  Tachycardia, NOS:                     APPOINTMENT  INFORMATION:        Monday Tuesday Wednesday Thursday Friday Saturday Sunday            Time:___________________AM  PM   Date:_____________________             CHARGE CAPTURE:           Primary Diagnosis:     785.0 Tachycardia, NOS            R00.0    Tachycardia, unspecified              Orders:          89933   Office/outpatient visit; established patient, level 3  (In-House)                   .

## 2023-05-02 DIAGNOSIS — M54.41 CHRONIC BILATERAL LOW BACK PAIN WITH BILATERAL SCIATICA: ICD-10-CM

## 2023-05-02 DIAGNOSIS — G89.29 CHRONIC BILATERAL LOW BACK PAIN WITH BILATERAL SCIATICA: ICD-10-CM

## 2023-05-02 DIAGNOSIS — M54.42 CHRONIC BILATERAL LOW BACK PAIN WITH BILATERAL SCIATICA: ICD-10-CM

## 2023-05-02 RX ORDER — GABAPENTIN 100 MG/1
100 CAPSULE ORAL 2 TIMES DAILY
Qty: 60 CAPSULE | Refills: 2 | Status: SHIPPED | OUTPATIENT
Start: 2023-05-02

## 2023-06-15 ENCOUNTER — LAB (OUTPATIENT)
Dept: LAB | Facility: HOSPITAL | Age: 48
End: 2023-06-15
Payer: COMMERCIAL

## 2023-06-15 ENCOUNTER — OFFICE VISIT (OUTPATIENT)
Dept: FAMILY MEDICINE CLINIC | Age: 48
End: 2023-06-15
Payer: COMMERCIAL

## 2023-06-15 VITALS
HEIGHT: 65 IN | SYSTOLIC BLOOD PRESSURE: 112 MMHG | BODY MASS INDEX: 26.66 KG/M2 | HEART RATE: 69 BPM | OXYGEN SATURATION: 99 % | WEIGHT: 160 LBS | DIASTOLIC BLOOD PRESSURE: 65 MMHG

## 2023-06-15 DIAGNOSIS — G89.29 CHRONIC BILATERAL LOW BACK PAIN WITH BILATERAL SCIATICA: ICD-10-CM

## 2023-06-15 DIAGNOSIS — M54.41 CHRONIC BILATERAL LOW BACK PAIN WITH BILATERAL SCIATICA: ICD-10-CM

## 2023-06-15 DIAGNOSIS — Z00.00 ANNUAL PHYSICAL EXAM: ICD-10-CM

## 2023-06-15 DIAGNOSIS — Z00.00 ANNUAL PHYSICAL EXAM: Primary | ICD-10-CM

## 2023-06-15 DIAGNOSIS — K21.9 GASTROESOPHAGEAL REFLUX DISEASE WITHOUT ESOPHAGITIS: ICD-10-CM

## 2023-06-15 DIAGNOSIS — M54.42 CHRONIC BILATERAL LOW BACK PAIN WITH BILATERAL SCIATICA: ICD-10-CM

## 2023-06-15 DIAGNOSIS — E03.9 ACQUIRED HYPOTHYROIDISM: ICD-10-CM

## 2023-06-15 DIAGNOSIS — J30.1 SEASONAL ALLERGIC RHINITIS DUE TO POLLEN: ICD-10-CM

## 2023-06-15 DIAGNOSIS — Z12.31 SCREENING MAMMOGRAM, ENCOUNTER FOR: ICD-10-CM

## 2023-06-15 DIAGNOSIS — F41.8 OTHER SPECIFIED ANXIETY DISORDERS: ICD-10-CM

## 2023-06-15 PROBLEM — L02.31 CELLULITIS AND ABSCESS OF BUTTOCK: Status: RESOLVED | Noted: 2022-08-03 | Resolved: 2023-06-15

## 2023-06-15 PROBLEM — L03.317 CELLULITIS AND ABSCESS OF BUTTOCK: Status: RESOLVED | Noted: 2022-08-03 | Resolved: 2023-06-15

## 2023-06-15 LAB
ALBUMIN SERPL-MCNC: 4.3 G/DL (ref 3.5–5.2)
ALBUMIN/GLOB SERPL: 1.4 G/DL
ALP SERPL-CCNC: 74 U/L (ref 39–117)
ALT SERPL W P-5'-P-CCNC: 12 U/L (ref 1–33)
ANION GAP SERPL CALCULATED.3IONS-SCNC: 8.9 MMOL/L (ref 5–15)
AST SERPL-CCNC: 18 U/L (ref 1–32)
BASOPHILS # BLD AUTO: 0.04 10*3/MM3 (ref 0–0.2)
BASOPHILS NFR BLD AUTO: 0.4 % (ref 0–1.5)
BILIRUB SERPL-MCNC: <0.2 MG/DL (ref 0–1.2)
BUN SERPL-MCNC: 10 MG/DL (ref 6–20)
BUN/CREAT SERPL: 13.2 (ref 7–25)
CALCIUM SPEC-SCNC: 9.9 MG/DL (ref 8.6–10.5)
CHLORIDE SERPL-SCNC: 104 MMOL/L (ref 98–107)
CHOLEST SERPL-MCNC: 223 MG/DL (ref 0–200)
CO2 SERPL-SCNC: 26.1 MMOL/L (ref 22–29)
CREAT SERPL-MCNC: 0.76 MG/DL (ref 0.57–1)
DEPRECATED RDW RBC AUTO: 44.3 FL (ref 37–54)
EGFRCR SERPLBLD CKD-EPI 2021: 96.2 ML/MIN/1.73
EOSINOPHIL # BLD AUTO: 0.11 10*3/MM3 (ref 0–0.4)
EOSINOPHIL NFR BLD AUTO: 1.2 % (ref 0.3–6.2)
ERYTHROCYTE [DISTWIDTH] IN BLOOD BY AUTOMATED COUNT: 12.8 % (ref 12.3–15.4)
GLOBULIN UR ELPH-MCNC: 3 GM/DL
GLUCOSE SERPL-MCNC: 90 MG/DL (ref 65–99)
HCT VFR BLD AUTO: 43 % (ref 34–46.6)
HDLC SERPL-MCNC: 60 MG/DL (ref 40–60)
HGB BLD-MCNC: 14 G/DL (ref 12–15.9)
IMM GRANULOCYTES # BLD AUTO: 0.02 10*3/MM3 (ref 0–0.05)
IMM GRANULOCYTES NFR BLD AUTO: 0.2 % (ref 0–0.5)
LDLC SERPL CALC-MCNC: 140 MG/DL (ref 0–100)
LDLC/HDLC SERPL: 2.29 {RATIO}
LYMPHOCYTES # BLD AUTO: 3.28 10*3/MM3 (ref 0.7–3.1)
LYMPHOCYTES NFR BLD AUTO: 35.3 % (ref 19.6–45.3)
MCH RBC QN AUTO: 30.6 PG (ref 26.6–33)
MCHC RBC AUTO-ENTMCNC: 32.6 G/DL (ref 31.5–35.7)
MCV RBC AUTO: 93.9 FL (ref 79–97)
MONOCYTES # BLD AUTO: 0.84 10*3/MM3 (ref 0.1–0.9)
MONOCYTES NFR BLD AUTO: 9 % (ref 5–12)
NEUTROPHILS NFR BLD AUTO: 5.01 10*3/MM3 (ref 1.7–7)
NEUTROPHILS NFR BLD AUTO: 53.9 % (ref 42.7–76)
PLATELET # BLD AUTO: 285 10*3/MM3 (ref 140–450)
PMV BLD AUTO: 10.4 FL (ref 6–12)
POTASSIUM SERPL-SCNC: 4.6 MMOL/L (ref 3.5–5.2)
PROT SERPL-MCNC: 7.3 G/DL (ref 6–8.5)
RBC # BLD AUTO: 4.58 10*6/MM3 (ref 3.77–5.28)
SODIUM SERPL-SCNC: 139 MMOL/L (ref 136–145)
TRIGL SERPL-MCNC: 127 MG/DL (ref 0–150)
TSH SERPL DL<=0.05 MIU/L-ACNC: 0.84 UIU/ML (ref 0.27–4.2)
VLDLC SERPL-MCNC: 23 MG/DL (ref 5–40)
WBC NRBC COR # BLD: 9.3 10*3/MM3 (ref 3.4–10.8)

## 2023-06-15 PROCEDURE — 80061 LIPID PANEL: CPT

## 2023-06-15 PROCEDURE — 99396 PREV VISIT EST AGE 40-64: CPT | Performed by: FAMILY MEDICINE

## 2023-06-15 PROCEDURE — 80050 GENERAL HEALTH PANEL: CPT

## 2023-06-15 PROCEDURE — 36415 COLL VENOUS BLD VENIPUNCTURE: CPT

## 2023-06-15 RX ORDER — MONTELUKAST SODIUM 10 MG/1
10 TABLET ORAL NIGHTLY
Qty: 90 TABLET | Refills: 3 | Status: SHIPPED | OUTPATIENT
Start: 2023-06-15

## 2023-06-15 RX ORDER — OMEPRAZOLE 40 MG/1
40 CAPSULE, DELAYED RELEASE ORAL DAILY PRN
Qty: 30 CAPSULE | Refills: 5 | Status: SHIPPED | OUTPATIENT
Start: 2023-06-15

## 2023-06-15 NOTE — ASSESSMENT & PLAN NOTE
She is coping as well as can be expected.  Good support network.  She should continue on Lexapro 20 mg daily.  No refills needed.  Continue to monitor.

## 2023-06-15 NOTE — PROGRESS NOTES
"Chief Complaint  Annual Exam    Subjective          Gabriela Riggs presents to [facility] today for her annual physical.      She is UTD on colonoscopy, last done 7/2019 and this was normal.  Ten year repeat recommended.  Pap smear is no longer indicated by history; s/p hysterectomy.  She is due for mammogram, last done 6/2021 and this was normal.   She is up-to-date on COVID (due for bivalent booster).  She is UTD on Tdap (8/2021).  Flu shot is not currently indicated.  She is due for routine labs including CMP, lipids and CBC.    She is on escitalopram for anxiety disorder.  Her  tragically passed away in the past year from pulmonary fibrosis.  Adjusting to the \"new normal\" has been hard.  Milestones are difficult.  She is coping well, however.  She is going to a grief support group       She is on gabapentin 100 mg BID for history of chronic low back pain.  This does work well to control her pain.  It does also seem to help her anxiety and depression.  Denies adverse effects.     She is on omeprazole prn for GERD but has been trying not to use it regularly.  No indigestion or reflux.  \"It comes in spurts.\"  She notices it more when her       She is no longer taking Synthroid for hypothyroidism.  We have been monitoring TSH and it has been WNL.    Review of Systems   Constitutional:  Negative for chills, fatigue and fever.   HENT:  Negative for congestion, hearing loss and rhinorrhea.    Eyes:  Negative for pain and visual disturbance.   Respiratory:  Negative for cough and shortness of breath.    Cardiovascular:  Negative for chest pain and palpitations.   Gastrointestinal:  Negative for abdominal pain, constipation, diarrhea, nausea and vomiting.   Genitourinary:  Negative for dysuria and hematuria.   Musculoskeletal:  Negative for arthralgias and myalgias.   Skin:  Negative for rash.   Neurological:  Negative for weakness and numbness.   Psychiatric/Behavioral:  " "Negative for dysphoric mood and sleep disturbance. The patient is not nervous/anxious.        Current Outpatient Medications:     albuterol sulfate  (90 Base) MCG/ACT inhaler, Inhale 2 puffs Every 6 (Six) Hours As Needed for Wheezing., Disp: 18 g, Rfl: 1    escitalopram (LEXAPRO) 20 MG tablet, Take 1 tablet BY MOUTH EVERY DAY, Disp: 90 tablet, Rfl: 1    gabapentin (NEURONTIN) 100 MG capsule, Take 1 capsule by mouth 2 (Two) Times a Day., Disp: 60 capsule, Rfl: 2    montelukast (Singulair) 10 MG tablet, Take 1 tablet by mouth Every Night., Disp: 90 tablet, Rfl: 3    omeprazole (priLOSEC) 40 MG capsule, Take 1 capsule by mouth Daily As Needed (reflux)., Disp: 30 capsule, Rfl: 5    Allergies:  Patient has no known allergies.      Objective   Vital Signs:   Vitals:    06/15/23 1132   BP: 112/65   BP Location: Right arm   Patient Position: Sitting   Cuff Size: Adult   Pulse: 69   SpO2: 99%   Weight: 72.6 kg (160 lb)   Height: 165.1 cm (65\")     Physical Exam  Vitals reviewed.   Constitutional:       General: She is not in acute distress.     Appearance: Normal appearance. She is well-developed.   HENT:      Head: Normocephalic and atraumatic.      Right Ear: External ear normal.      Left Ear: External ear normal.      Mouth/Throat:      Mouth: Mucous membranes are moist.   Eyes:      Extraocular Movements: Extraocular movements intact.      Conjunctiva/sclera: Conjunctivae normal.      Pupils: Pupils are equal, round, and reactive to light.   Cardiovascular:      Rate and Rhythm: Normal rate and regular rhythm.      Heart sounds: No murmur heard.  Pulmonary:      Effort: Pulmonary effort is normal.      Breath sounds: Normal breath sounds. No wheezing, rhonchi or rales.   Abdominal:      General: Bowel sounds are normal. There is no distension.      Palpations: Abdomen is soft.      Tenderness: There is no abdominal tenderness.   Musculoskeletal:         General: Normal range of motion.   Skin:     General: Skin " is warm and dry.   Neurological:      Mental Status: She is alert and oriented to person, place, and time.      Deep Tendon Reflexes: Reflexes normal.   Psychiatric:         Mood and Affect: Mood and affect normal.         Behavior: Behavior normal.         Thought Content: Thought content normal.         Judgment: Judgment normal.           Assessment and Plan    Diagnoses and all orders for this visit:    1. Annual physical exam (Primary)  Assessment & Plan:  She is UTD on colonoscopy, last done 7/2019 and this was normal.  Ten year repeat recommended.  Pap smear is no longer indicated by history; s/p hysterectomy.  She is due for mammogram, last done 6/2021 and this was normal; ordered.   She is up-to-date on COVID (due for bivalent booster - deferred, she will get in the fall).  She is UTD on Tdap (8/2021).  Flu shot is not currently indicated.  She is due for routine labs including CMP, lipids and CBC; ordered.    Orders:  -     Lipid Panel; Future  -     Comprehensive Metabolic Panel; Future  -     CBC Auto Differential; Future    2. Other specified anxiety disorders  Assessment & Plan:  She is coping as well as can be expected.  Good support network.  She should continue on Lexapro 20 mg daily.  No refills needed.  Continue to monitor.      3. Chronic bilateral low back pain with bilateral sciatica  Assessment & Plan:  Stable on current regimen.  Symptoms are well controlled.  No adverse effects. She does require ongoing use of this controlled substance to function.  Tox screen was not due today.  Prior tox screen appropriate.  LEONORA was run today.  Refills were not needed today.  RTC 6 months.        4. Acquired hypothyroidism  Overview:  Not currently on medication.  Monitoring with labs.    Orders:  -     TSH; Future    5. Gastroesophageal reflux disease without esophagitis  Assessment & Plan:  Stable on omeprazole 40 mg daily as needed.  She does not require it on a regular basis.  Refills sent.   Continue to monitor.  Wean PPI as able.    Orders:  -     omeprazole (priLOSEC) 40 MG capsule; Take 1 capsule by mouth Daily As Needed (reflux).  Dispense: 30 capsule; Refill: 5    6. Seasonal allergic rhinitis due to pollen  -     montelukast (Singulair) 10 MG tablet; Take 1 tablet by mouth Every Night.  Dispense: 90 tablet; Refill: 3    7. Screening mammogram, encounter for  -     Mammo Screening Digital Tomosynthesis Bilateral With CAD; Future        Follow Up   Return in about 6 months (around 12/15/2023) for Recheck.  Patient was given instructions and counseling regarding her condition or for health maintenance advice. Please see specific information pulled into the AVS if appropriate.

## 2023-06-15 NOTE — ASSESSMENT & PLAN NOTE
She is UTD on colonoscopy, last done 7/2019 and this was normal.  Ten year repeat recommended.  Pap smear is no longer indicated by history; s/p hysterectomy.  She is due for mammogram, last done 6/2021 and this was normal; ordered.   She is up-to-date on COVID (due for bivalent booster - deferred, she will get in the fall).  She is UTD on Tdap (8/2021).  Flu shot is not currently indicated.  She is due for routine labs including CMP, lipids and CBC; ordered.

## 2023-06-15 NOTE — ASSESSMENT & PLAN NOTE
Stable on current regimen.  Symptoms are well controlled.  No adverse effects. She does require ongoing use of this controlled substance to function.  Tox screen was not due today.  Prior tox screen appropriate.  LEONORA was run today.  Refills were not needed today.  RTC 6 months.

## 2023-06-15 NOTE — ASSESSMENT & PLAN NOTE
Stable on omeprazole 40 mg daily as needed.  She does not require it on a regular basis.  Refills sent.  Continue to monitor.  Wean PPI as able.

## 2023-07-23 DIAGNOSIS — M54.41 CHRONIC BILATERAL LOW BACK PAIN WITH BILATERAL SCIATICA: ICD-10-CM

## 2023-07-23 DIAGNOSIS — M54.42 CHRONIC BILATERAL LOW BACK PAIN WITH BILATERAL SCIATICA: ICD-10-CM

## 2023-07-23 DIAGNOSIS — G89.29 CHRONIC BILATERAL LOW BACK PAIN WITH BILATERAL SCIATICA: ICD-10-CM

## 2023-07-25 RX ORDER — GABAPENTIN 100 MG/1
100 CAPSULE ORAL 2 TIMES DAILY
Qty: 60 CAPSULE | Refills: 2 | Status: SHIPPED | OUTPATIENT
Start: 2023-07-25

## 2023-09-22 DIAGNOSIS — F41.9 ANXIETY: ICD-10-CM

## 2023-09-22 RX ORDER — ESCITALOPRAM OXALATE 20 MG/1
TABLET ORAL
Qty: 90 TABLET | Refills: 1 | Status: SHIPPED | OUTPATIENT
Start: 2023-09-22

## 2023-10-21 DIAGNOSIS — M54.42 CHRONIC BILATERAL LOW BACK PAIN WITH BILATERAL SCIATICA: ICD-10-CM

## 2023-10-21 DIAGNOSIS — G89.29 CHRONIC BILATERAL LOW BACK PAIN WITH BILATERAL SCIATICA: ICD-10-CM

## 2023-10-21 DIAGNOSIS — M54.41 CHRONIC BILATERAL LOW BACK PAIN WITH BILATERAL SCIATICA: ICD-10-CM

## 2023-10-24 RX ORDER — GABAPENTIN 100 MG/1
100 CAPSULE ORAL 2 TIMES DAILY
Qty: 60 CAPSULE | Refills: 2 | Status: SHIPPED | OUTPATIENT
Start: 2023-10-24

## 2023-10-30 ENCOUNTER — OFFICE VISIT (OUTPATIENT)
Dept: FAMILY MEDICINE CLINIC | Age: 48
End: 2023-10-30
Payer: COMMERCIAL

## 2023-10-30 VITALS
SYSTOLIC BLOOD PRESSURE: 112 MMHG | HEART RATE: 84 BPM | WEIGHT: 151.6 LBS | DIASTOLIC BLOOD PRESSURE: 71 MMHG | BODY MASS INDEX: 25.26 KG/M2 | HEIGHT: 65 IN

## 2023-10-30 DIAGNOSIS — R30.0 DYSURIA: ICD-10-CM

## 2023-10-30 DIAGNOSIS — N39.0 ACUTE UTI: Primary | ICD-10-CM

## 2023-10-30 LAB
BACTERIA UR QL AUTO: ABNORMAL /HPF
BILIRUB UR QL STRIP: NEGATIVE
CLARITY UR: ABNORMAL
COLOR UR: YELLOW
GLUCOSE UR STRIP-MCNC: NEGATIVE MG/DL
HGB UR QL STRIP.AUTO: ABNORMAL
KETONES UR QL STRIP: NEGATIVE
LEUKOCYTE ESTERASE UR QL STRIP.AUTO: ABNORMAL
NITRITE UR QL STRIP: POSITIVE
PH UR STRIP.AUTO: 6 [PH] (ref 5–8)
PROT UR QL STRIP: NEGATIVE
RBC # UR STRIP: ABNORMAL /HPF
REF LAB TEST METHOD: ABNORMAL
SP GR UR STRIP: <=1.005 (ref 1–1.03)
SQUAMOUS #/AREA URNS HPF: ABNORMAL /HPF
UROBILINOGEN UR QL STRIP: ABNORMAL
WBC # UR STRIP: ABNORMAL /HPF

## 2023-10-30 PROCEDURE — 87086 URINE CULTURE/COLONY COUNT: CPT | Performed by: PHYSICIAN ASSISTANT

## 2023-10-30 PROCEDURE — 81001 URINALYSIS AUTO W/SCOPE: CPT | Performed by: PHYSICIAN ASSISTANT

## 2023-10-30 PROCEDURE — 99213 OFFICE O/P EST LOW 20 MIN: CPT | Performed by: PHYSICIAN ASSISTANT

## 2023-10-30 RX ORDER — NITROFURANTOIN 25; 75 MG/1; MG/1
100 CAPSULE ORAL 2 TIMES DAILY
Qty: 10 CAPSULE | Refills: 0 | Status: SHIPPED | OUTPATIENT
Start: 2023-10-30 | End: 2023-11-04

## 2023-10-30 NOTE — PROGRESS NOTES
"  Diagnoses and all orders for this visit:    1. Acute UTI (Primary)  -     nitrofurantoin, macrocrystal-monohydrate, (Macrobid) 100 MG capsule; Take 1 capsule by mouth 2 (Two) Times a Day for 5 days.  Dispense: 10 capsule; Refill: 0  -     Urine Culture - Urine, Urine, Clean Catch; Future    2. Dysuria  -     Urinalysis With Microscopic - Urine, Clean Catch            Subjective     CHIEF COMPLAINT    Chief Complaint   Patient presents with    Urinary Tract Infection     Dysuria, frequent urination x 2 days             History of Present Illness  This is a 47-year-old female presenting to the clinic complaining of dysuria and frequent urination for the last 2 days.  She reports a history of urinary tract infections somewhat frequently and states they typically come on pretty quickly.  She took Azo and that did not seem to help very much.              Review of Systems   Constitutional:  Negative for chills and fever.   Gastrointestinal:  Positive for abdominal pain (lower abdominal pressure). Negative for nausea and vomiting.   Genitourinary:  Positive for dysuria, frequency, hematuria and urgency. Negative for flank pain, vaginal bleeding and vaginal discharge.   Musculoskeletal:  Negative for back pain.            Past Medical History:   Diagnosis Date    Anxiety     Atypical chest pain     Bulging lumbar disc 2013    Hashimoto's thyroiditis     Palpitation     Urinary tract infection             Past Surgical History:   Procedure Laterality Date    CHOLECYSTECTOMY  2014    SUBTOTAL HYSTERECTOMY  2000            Family History   Problem Relation Age of Onset    Arthritis Mother             Social History     Socioeconomic History    Marital status:    Tobacco Use    Smoking status: Every Day     Packs/day: 1.00     Years: 19.00     Additional pack years: 0.00     Total pack years: 19.00     Types: Cigarettes     Start date: 1/1/2003    Smokeless tobacco: Never    Tobacco comments:     \"eventually\"- ready " "to quit smoking 4/20/2022   Vaping Use    Vaping Use: Never used   Substance and Sexual Activity    Alcohol use: No    Drug use: Never    Sexual activity: Not Currently            No Known Allergies         Current Outpatient Medications on File Prior to Visit   Medication Sig Dispense Refill    albuterol sulfate  (90 Base) MCG/ACT inhaler Inhale 2 puffs Every 6 (Six) Hours As Needed for Wheezing. 18 g 1    escitalopram (LEXAPRO) 20 MG tablet Take 1 tablet BY MOUTH EVERY DAY 90 tablet 1    gabapentin (NEURONTIN) 100 MG capsule Take 1 capsule by mouth 2 (Two) Times a Day. 60 capsule 2    montelukast (Singulair) 10 MG tablet Take 1 tablet by mouth Every Night. 90 tablet 3    omeprazole (priLOSEC) 40 MG capsule Take 1 capsule by mouth Daily As Needed (reflux). 30 capsule 5     No current facility-administered medications on file prior to visit.            /71 (BP Location: Left arm, Patient Position: Sitting)   Pulse 84   Ht 165.1 cm (65\")   Wt 68.8 kg (151 lb 9.6 oz)   BMI 25.23 kg/m²          Objective     Physical Exam  Vitals and nursing note reviewed.   Constitutional:       Appearance: Normal appearance.   Cardiovascular:      Rate and Rhythm: Normal rate and regular rhythm.      Heart sounds: Normal heart sounds.   Pulmonary:      Effort: Pulmonary effort is normal.      Breath sounds: Normal breath sounds.   Abdominal:      General: There is no distension.      Palpations: Abdomen is soft.      Tenderness: There is no abdominal tenderness. There is no right CVA tenderness, left CVA tenderness or guarding.   Skin:     General: Skin is warm and dry.      Findings: No rash.   Neurological:      Mental Status: She is alert and oriented to person, place, and time.   Psychiatric:         Mood and Affect: Mood normal.         Behavior: Behavior normal.              Procedures                    Lab Results (last 24 hours)       Procedure Component Value Units Date/Time    Urinalysis With Microscopic - " Urine, Clean Catch [[id number]]  (Abnormal) Collected: 10/30/23 1216    Specimen: Urine, Clean Catch Updated: 10/30/23 1234    Narrative:      The following orders were created for panel order Urinalysis With Microscopic - Urine, Clean Catch.  Procedure                               Abnormality         Status                     ---------                               -----------         ------                     Urinalysis without micro...[[id number]]  Abnormal            Final result               Urinalysis, Microscopic ...[[id number]]  Abnormal            Final result                 Please view results for these tests on the individual orders.    Urinalysis without microscopic (no culture) - Urine, Clean Catch [[id number]]  (Abnormal) Collected: 10/30/23 1216    Specimen: Urine, Clean Catch Updated: 10/30/23 1233     Color, UA Yellow     Appearance, UA Slightly Cloudy     pH, UA 6.0     Specific Gravity, UA <=1.005     Glucose, UA Negative     Ketones, UA Negative     Bilirubin, UA Negative     Blood, UA Moderate (2+)     Protein, UA Negative     Leuk Esterase, UA Small (1+)     Nitrite, UA Positive     Urobilinogen, UA 0.2 E.U./dL    Urinalysis, Microscopic Only - Urine, Clean Catch [[id number]]  (Abnormal) Collected: 10/30/23 1216    Specimen: Urine, Clean Catch Updated: 10/30/23 1234     RBC, UA 3-5 /HPF      WBC, UA 21-50 /HPF      Bacteria, UA 1+ /HPF      Squamous Epithelial Cells, UA 3-6 /HPF      Methodology Manual Light Microscopy                  No Radiology Exams Resulted Within Past 24 Hours                    Diagnoses and all orders for this visit:    1. Acute UTI (Primary)  -     nitrofurantoin, macrocrystal-monohydrate, (Macrobid) 100 MG capsule; Take 1 capsule by mouth 2 (Two) Times a Day for 5 days.  Dispense: 10 capsule; Refill: 0  -     Urine Culture - Urine, Urine, Clean Catch; Future    2. Dysuria  -     Urinalysis With Microscopic - Urine, Clean Catch             Additional Instructions for  the Follow-ups that You Need to Schedule       Urine Culture - Urine, Urine, Clean Catch    Oct 30, 2023 (Approximate)      Release to patient: Routine Release                            FOR FULL DISCHARGE INSTRUCTIONS/COMMENTS/HANDOUTS please see the   AVS

## 2023-10-31 LAB — BACTERIA SPEC AEROBE CULT: NO GROWTH

## 2023-12-04 ENCOUNTER — TELEMEDICINE (OUTPATIENT)
Dept: FAMILY MEDICINE CLINIC | Facility: TELEHEALTH | Age: 48
End: 2023-12-04
Payer: COMMERCIAL

## 2023-12-04 DIAGNOSIS — J01.00 ACUTE NON-RECURRENT MAXILLARY SINUSITIS: Primary | ICD-10-CM

## 2023-12-04 RX ORDER — AMOXICILLIN AND CLAVULANATE POTASSIUM 875; 125 MG/1; MG/1
1 TABLET, FILM COATED ORAL 2 TIMES DAILY
Qty: 20 TABLET | Refills: 0 | Status: SHIPPED | OUTPATIENT
Start: 2023-12-04 | End: 2023-12-14

## 2023-12-04 NOTE — PATIENT INSTRUCTIONS
-Take all meds as prescribed even if you start feeling better  -May use motrin/tylenol or warm moist compress on the face for pain.   -May use saline nasal spray, netipot or flonase as desired  -If symptoms worsen or do not improve in 1 week follow up with urgent care or your primary care provider

## 2023-12-04 NOTE — PROGRESS NOTES
Subjective   Gabriela Riggs is a 47 y.o. female.     History of Present Illness  She tested postitive for Covid-19 a week ago. Her nasal drainage is getting worse, its more thick and green. She felt better at the end of last week but over the weekend she started feeling worse. She has used saline spray, flonase, and tylenol, ibuprofen, mucinex. She has not been on antibiotics in the past month.        The following portions of the patient's history were reviewed and updated as appropriate: allergies, current medications, past family history, past medical history, past social history, past surgical history, and problem list.    Review of Systems   Constitutional:  Negative for fever.   HENT:  Positive for congestion, postnasal drip, rhinorrhea and sinus pressure. Negative for nosebleeds.        Objective   Physical Exam  Constitutional:       General: She is not in acute distress.     Appearance: She is well-developed. She is not diaphoretic.   HENT:      Nose:      Right Sinus: Maxillary sinus tenderness present. No frontal sinus tenderness.      Left Sinus: No maxillary sinus tenderness or frontal sinus tenderness.      Comments: Pain increases when she bends forward  Pulmonary:      Effort: Pulmonary effort is normal.   Neurological:      Mental Status: She is alert and oriented to person, place, and time.   Psychiatric:         Behavior: Behavior normal.           Assessment & Plan   Diagnoses and all orders for this visit:    1. Acute non-recurrent maxillary sinusitis (Primary)  -     amoxicillin-clavulanate (AUGMENTIN) 875-125 MG per tablet; Take 1 tablet by mouth 2 (Two) Times a Day for 10 days.  Dispense: 20 tablet; Refill: 0                 The use of a video visit has been reviewed with the patient and verbal informed consent has been obtained. Myself and Gabriela Riggs participated in this visit. The patient is located in  [place], [place] at home . I am located in [place], [place]. [employer] and [employer]  Client were utilized. I spent 10 minutes in the patient's chart for this visit.

## 2024-02-02 DIAGNOSIS — F41.9 ANXIETY: ICD-10-CM

## 2024-02-02 DIAGNOSIS — G89.29 CHRONIC BILATERAL LOW BACK PAIN WITH BILATERAL SCIATICA: ICD-10-CM

## 2024-02-02 DIAGNOSIS — M54.41 CHRONIC BILATERAL LOW BACK PAIN WITH BILATERAL SCIATICA: ICD-10-CM

## 2024-02-02 DIAGNOSIS — M54.42 CHRONIC BILATERAL LOW BACK PAIN WITH BILATERAL SCIATICA: ICD-10-CM

## 2024-02-02 RX ORDER — ESCITALOPRAM OXALATE 20 MG/1
20 TABLET ORAL DAILY
Qty: 90 TABLET | Refills: 0 | Status: SHIPPED | OUTPATIENT
Start: 2024-02-02

## 2024-02-02 RX ORDER — GABAPENTIN 100 MG/1
100 CAPSULE ORAL 2 TIMES DAILY
Qty: 60 CAPSULE | Refills: 0 | Status: SHIPPED | OUTPATIENT
Start: 2024-02-02

## 2024-02-08 ENCOUNTER — OFFICE VISIT (OUTPATIENT)
Dept: FAMILY MEDICINE CLINIC | Age: 49
End: 2024-02-08
Payer: COMMERCIAL

## 2024-02-08 VITALS
SYSTOLIC BLOOD PRESSURE: 116 MMHG | HEART RATE: 73 BPM | DIASTOLIC BLOOD PRESSURE: 75 MMHG | OXYGEN SATURATION: 96 % | WEIGHT: 146 LBS | BODY MASS INDEX: 24.32 KG/M2 | HEIGHT: 65 IN

## 2024-02-08 DIAGNOSIS — E03.9 ACQUIRED HYPOTHYROIDISM: ICD-10-CM

## 2024-02-08 DIAGNOSIS — Z79.899 HIGH RISK MEDICATION USE: ICD-10-CM

## 2024-02-08 DIAGNOSIS — Z12.31 SCREENING MAMMOGRAM FOR BREAST CANCER: ICD-10-CM

## 2024-02-08 DIAGNOSIS — M54.41 CHRONIC BILATERAL LOW BACK PAIN WITH BILATERAL SCIATICA: ICD-10-CM

## 2024-02-08 DIAGNOSIS — G89.29 CHRONIC BILATERAL LOW BACK PAIN WITH BILATERAL SCIATICA: ICD-10-CM

## 2024-02-08 DIAGNOSIS — K21.9 GASTROESOPHAGEAL REFLUX DISEASE WITHOUT ESOPHAGITIS: ICD-10-CM

## 2024-02-08 DIAGNOSIS — M54.42 CHRONIC BILATERAL LOW BACK PAIN WITH BILATERAL SCIATICA: ICD-10-CM

## 2024-02-08 DIAGNOSIS — F41.1 GENERALIZED ANXIETY DISORDER: Primary | ICD-10-CM

## 2024-02-08 DIAGNOSIS — Z72.0 TOBACCO ABUSE: ICD-10-CM

## 2024-02-08 PROBLEM — Z00.00 ANNUAL PHYSICAL EXAM: Status: RESOLVED | Noted: 2022-06-02 | Resolved: 2024-02-08

## 2024-02-08 LAB
AMPHET+METHAMPHET UR QL: NEGATIVE
AMPHETAMINES UR QL: NEGATIVE
BARBITURATES UR QL SCN: NEGATIVE
BENZODIAZ UR QL SCN: NEGATIVE
BUPRENORPHINE SERPL-MCNC: NEGATIVE NG/ML
CANNABINOIDS SERPL QL: NEGATIVE
COCAINE UR QL: NEGATIVE
EXPIRATION DATE: NORMAL
Lab: NORMAL
MDMA UR QL SCN: NEGATIVE
METHADONE UR QL SCN: NEGATIVE
MORPHINE/OPIATES SCREEN, URINE: NEGATIVE
OXYCODONE UR QL SCN: NEGATIVE
PCP UR QL SCN: NEGATIVE

## 2024-02-08 NOTE — ASSESSMENT & PLAN NOTE
Stable on current regimen.  Symptoms are well controlled.  No adverse effects. She does require ongoing use of this controlled substance to function.  Tox screen was due today.  Prior tox screen appropriate.  LEONORA was run today.  Refills were not needed today.  RTC 6 months.

## 2024-02-08 NOTE — ASSESSMENT & PLAN NOTE
She is working on getting ready to stop smoking.  She has used  Wellbutrin before to good effect.  OK to call in for this when she is ready to try it and I will send in Wellbutrin 150 mg XL daily, #30, 5 RFs.

## 2024-02-08 NOTE — PROGRESS NOTES
"Chief Complaint  Anxiety (Med refills )    Subjective          Gabriela Riggs presents to [facility] today for follow-up on chronic issues.    She is on escitalopram for anxiety disorder.  Mood has been \"fine.\"      She does feel like she has had a little right-sided chest congestion for the past couple of weeks.  She just recently restarted her montelukast.      She has lost about 20 lbs.  She has been actively trying to lose weight.        She is on gabapentin 100 mg BID for history of chronic low back pain.  Pain is well-controlled.  No adverse effects.     She is on omeprazole as needed for GERD.  Denies heartburn or indigestion.      She is no longer taking Synthroid for hypothyroidism.  We have been monitoring TSH and it has been WNL.    She is due for mammogram.      Current Outpatient Medications:     albuterol sulfate  (90 Base) MCG/ACT inhaler, Inhale 2 puffs Every 6 (Six) Hours As Needed for Wheezing., Disp: 18 g, Rfl: 1    escitalopram (LEXAPRO) 20 MG tablet, Take 1 tablet by mouth Daily., Disp: 90 tablet, Rfl: 0    gabapentin (NEURONTIN) 100 MG capsule, Take 1 capsule by mouth 2 (Two) Times a Day., Disp: 60 capsule, Rfl: 0    montelukast (Singulair) 10 MG tablet, Take 1 tablet by mouth Every Night., Disp: 90 tablet, Rfl: 3    omeprazole (priLOSEC) 40 MG capsule, Take 1 capsule by mouth Daily As Needed (reflux)., Disp: 30 capsule, Rfl: 5  There are no discontinued medications.      Allergies:  Patient has no known allergies.      Objective   Vital Signs:   Vitals:    02/08/24 1422   BP: 116/75   BP Location: Right arm   Patient Position: Sitting   Cuff Size: Adult   Pulse: 73   SpO2: 96%   Weight: 66.2 kg (146 lb)   Height: 165.1 cm (65\")         Physical Exam  Vitals reviewed.   Constitutional:       General: She is not in acute distress.     Appearance: Normal appearance. She is well-developed.   HENT:      Head: Normocephalic and atraumatic.      Right Ear: " External ear normal.      Left Ear: External ear normal.   Eyes:      Extraocular Movements: Extraocular movements intact.      Conjunctiva/sclera: Conjunctivae normal.      Pupils: Pupils are equal, round, and reactive to light.   Cardiovascular:      Rate and Rhythm: Normal rate and regular rhythm.      Heart sounds: No murmur heard.  Pulmonary:      Effort: Pulmonary effort is normal.      Breath sounds: Normal breath sounds. No wheezing, rhonchi or rales.   Abdominal:      General: Bowel sounds are normal. There is no distension.      Palpations: Abdomen is soft.      Tenderness: There is no abdominal tenderness.   Musculoskeletal:         General: Normal range of motion.   Neurological:      Mental Status: She is alert.   Psychiatric:         Mood and Affect: Affect normal.           Lab Results   Component Value Date    GLUCOSE 90 06/15/2023    BUN 10 06/15/2023    CREATININE 0.76 06/15/2023    EGFRIFNONA 92 08/25/2019    BCR 13.2 06/15/2023    K 4.6 06/15/2023    CO2 26.1 06/15/2023    CALCIUM 9.9 06/15/2023    ALBUMIN 4.3 06/15/2023    LABIL2 1.4 04/13/2021    AST 18 06/15/2023    ALT 12 06/15/2023       Lab Results   Component Value Date    CHOL 223 (H) 06/15/2023    CHLPL 195 02/13/2020    TRIG 127 06/15/2023    HDL 60 06/15/2023     (H) 06/15/2023       Lab Results   Component Value Date    WBC 9.30 06/15/2023    HGB 14.0 06/15/2023    HCT 43.0 06/15/2023    MCV 93.9 06/15/2023     06/15/2023            Assessment and Plan    Diagnoses and all orders for this visit:    1. Generalized anxiety disorder (Primary)  Assessment & Plan:  Stable on escitalopram 20 mg daily.  Refills were not needed today.  Continue to monitor.        2. Chronic bilateral low back pain with bilateral sciatica  Assessment & Plan:  Stable on current regimen.  Symptoms are well controlled.  No adverse effects. She does require ongoing use of this controlled substance to function.  Tox screen was due today.  Prior tox  screen appropriate.  LEONORA was run today.  Refills were not needed today.  RTC 6 months.        3. High risk medication use  -     POC Medline 12 Panel Urine Drug Screen    4. Gastroesophageal reflux disease without esophagitis  Assessment & Plan:  Stable on omeprazole 40 mg daily.  Refills were not needed today.  Continue to monitor.  Wean PPI as able.        5. Acquired hypothyroidism  Overview:  Not currently on medication.  Monitoring with labs.      6. Tobacco abuse  Assessment & Plan:  She is working on getting ready to stop smoking.  She has used  Wellbutrin before to good effect.  OK to call in for this when she is ready to try it and I will send in Wellbutrin 150 mg XL daily, #30, 5 RFs.        7. Screening mammogram for breast cancer  -     Mammo Screening Digital Tomosynthesis Bilateral With CAD; Future        Gabriela Riggs  reports that she has been smoking cigarettes. She started smoking about 21 years ago. She has a 19.00 pack-year smoking history. She has never used smokeless tobacco.. I have educated her on the risk of diseases from using tobacco products such as cancer, COPD, and heart disease.     I advised her to quit and she is willing to quit. We have discussed the following method/s for tobacco cessation:  Counseling.  Together we have set a quit date for  no date set .  She will follow up with me in  6  months or sooner to check on her progress.    I spent  3  minutes counseling the patient.            Follow Up   Return in about 6 months (around 8/8/2024) for Annual physical.  Patient was given instructions and counseling regarding her condition or for health maintenance advice. Please see specific information pulled into the AVS if appropriate.           02/08/2024

## 2024-02-22 ENCOUNTER — HOSPITAL ENCOUNTER (OUTPATIENT)
Dept: MAMMOGRAPHY | Facility: HOSPITAL | Age: 49
Discharge: HOME | End: 2024-02-22
Admitting: FAMILY MEDICINE
Payer: COMMERCIAL

## 2024-02-22 DIAGNOSIS — Z12.31 SCREENING MAMMOGRAM FOR BREAST CANCER: ICD-10-CM

## 2024-02-22 PROCEDURE — 77063 BREAST TOMOSYNTHESIS BI: CPT

## 2024-02-22 PROCEDURE — 77067 SCR MAMMO BI INCL CAD: CPT

## 2024-03-06 DIAGNOSIS — M54.42 CHRONIC BILATERAL LOW BACK PAIN WITH BILATERAL SCIATICA: ICD-10-CM

## 2024-03-06 DIAGNOSIS — M54.41 CHRONIC BILATERAL LOW BACK PAIN WITH BILATERAL SCIATICA: ICD-10-CM

## 2024-03-06 DIAGNOSIS — G89.29 CHRONIC BILATERAL LOW BACK PAIN WITH BILATERAL SCIATICA: ICD-10-CM

## 2024-03-07 RX ORDER — GABAPENTIN 100 MG/1
100 CAPSULE ORAL 2 TIMES DAILY
Qty: 60 CAPSULE | Refills: 2 | Status: SHIPPED | OUTPATIENT
Start: 2024-03-07

## 2024-05-09 ENCOUNTER — OFFICE VISIT (OUTPATIENT)
Dept: FAMILY MEDICINE CLINIC | Age: 49
End: 2024-05-09
Payer: COMMERCIAL

## 2024-05-09 VITALS
HEIGHT: 65 IN | WEIGHT: 148.2 LBS | DIASTOLIC BLOOD PRESSURE: 67 MMHG | HEART RATE: 63 BPM | TEMPERATURE: 97.8 F | BODY MASS INDEX: 24.69 KG/M2 | SYSTOLIC BLOOD PRESSURE: 110 MMHG | OXYGEN SATURATION: 97 %

## 2024-05-09 DIAGNOSIS — N30.01 ACUTE CYSTITIS WITH HEMATURIA: ICD-10-CM

## 2024-05-09 DIAGNOSIS — R30.0 DYSURIA: Primary | ICD-10-CM

## 2024-05-09 LAB
BILIRUB BLD-MCNC: NEGATIVE MG/DL
CANDIDA SPECIES: NEGATIVE
CLARITY, POC: CLEAR
COLOR UR: YELLOW
EXPIRATION DATE: ABNORMAL
GARDNERELLA VAGINALIS: NEGATIVE
GLUCOSE UR STRIP-MCNC: NEGATIVE MG/DL
KETONES UR QL: NEGATIVE
LEUKOCYTE EST, POC: ABNORMAL
Lab: ABNORMAL
NITRITE UR-MCNC: POSITIVE MG/ML
PH UR: 5.5 [PH] (ref 5–8)
PROT UR STRIP-MCNC: NEGATIVE MG/DL
RBC # UR STRIP: ABNORMAL /UL
SP GR UR: 1.01 (ref 1–1.03)
T VAGINALIS DNA VAG QL PROBE+SIG AMP: NEGATIVE
UROBILINOGEN UR QL: ABNORMAL

## 2024-05-09 PROCEDURE — 87480 CANDIDA DNA DIR PROBE: CPT | Performed by: NURSE PRACTITIONER

## 2024-05-09 PROCEDURE — 87510 GARDNER VAG DNA DIR PROBE: CPT | Performed by: NURSE PRACTITIONER

## 2024-05-09 PROCEDURE — 99213 OFFICE O/P EST LOW 20 MIN: CPT | Performed by: NURSE PRACTITIONER

## 2024-05-09 PROCEDURE — 87660 TRICHOMONAS VAGIN DIR PROBE: CPT | Performed by: NURSE PRACTITIONER

## 2024-05-09 PROCEDURE — 81003 URINALYSIS AUTO W/O SCOPE: CPT | Performed by: NURSE PRACTITIONER

## 2024-05-09 PROCEDURE — 87086 URINE CULTURE/COLONY COUNT: CPT | Performed by: NURSE PRACTITIONER

## 2024-05-09 PROCEDURE — 87186 SC STD MICRODIL/AGAR DIL: CPT | Performed by: NURSE PRACTITIONER

## 2024-05-09 PROCEDURE — 87077 CULTURE AEROBIC IDENTIFY: CPT | Performed by: NURSE PRACTITIONER

## 2024-05-09 RX ORDER — PHENAZOPYRIDINE HYDROCHLORIDE 200 MG/1
200 TABLET, FILM COATED ORAL 3 TIMES DAILY PRN
Qty: 9 TABLET | Refills: 1 | Status: SHIPPED | OUTPATIENT
Start: 2024-05-09

## 2024-05-09 RX ORDER — SULFAMETHOXAZOLE AND TRIMETHOPRIM 800; 160 MG/1; MG/1
1 TABLET ORAL 2 TIMES DAILY
Qty: 10 TABLET | Refills: 0 | Status: SHIPPED | OUTPATIENT
Start: 2024-05-09

## 2024-05-11 LAB — BACTERIA SPEC AEROBE CULT: ABNORMAL

## 2024-05-13 RX ORDER — NITROFURANTOIN 25; 75 MG/1; MG/1
100 CAPSULE ORAL 2 TIMES DAILY
Qty: 10 CAPSULE | Refills: 0 | Status: SHIPPED | OUTPATIENT
Start: 2024-05-13 | End: 2024-05-18

## 2024-05-16 ENCOUNTER — PATIENT MESSAGE (OUTPATIENT)
Dept: FAMILY MEDICINE CLINIC | Age: 49
End: 2024-05-16
Payer: COMMERCIAL

## 2024-05-17 RX ORDER — BUPROPION HYDROCHLORIDE 150 MG/1
150 TABLET ORAL DAILY
Qty: 30 TABLET | Refills: 5 | Status: SHIPPED | OUTPATIENT
Start: 2024-05-17

## 2024-05-17 NOTE — TELEPHONE ENCOUNTER
From: Gabriela Riggs  To: Candis Carey  Sent: 5/16/2024 10:48 PM EDT  Subject: Stop Smoking    Hello! I hope you are well. I think I am ready to try the Wellbutrin to help with stop smoking.

## 2024-06-02 DIAGNOSIS — G89.29 CHRONIC BILATERAL LOW BACK PAIN WITH BILATERAL SCIATICA: ICD-10-CM

## 2024-06-02 DIAGNOSIS — F41.9 ANXIETY: ICD-10-CM

## 2024-06-02 DIAGNOSIS — M54.42 CHRONIC BILATERAL LOW BACK PAIN WITH BILATERAL SCIATICA: ICD-10-CM

## 2024-06-02 DIAGNOSIS — M54.41 CHRONIC BILATERAL LOW BACK PAIN WITH BILATERAL SCIATICA: ICD-10-CM

## 2024-06-03 RX ORDER — ESCITALOPRAM OXALATE 20 MG/1
20 TABLET ORAL DAILY
Qty: 90 TABLET | Refills: 1 | Status: SHIPPED | OUTPATIENT
Start: 2024-06-03

## 2024-06-04 RX ORDER — GABAPENTIN 100 MG/1
100 CAPSULE ORAL 2 TIMES DAILY
Qty: 60 CAPSULE | Refills: 2 | Status: SHIPPED | OUTPATIENT
Start: 2024-06-04

## 2024-08-09 ENCOUNTER — PATIENT MESSAGE (OUTPATIENT)
Dept: FAMILY MEDICINE CLINIC | Age: 49
End: 2024-08-09

## 2024-08-09 ENCOUNTER — OFFICE VISIT (OUTPATIENT)
Dept: FAMILY MEDICINE CLINIC | Age: 49
End: 2024-08-09
Payer: COMMERCIAL

## 2024-08-09 VITALS
BODY MASS INDEX: 25.36 KG/M2 | TEMPERATURE: 97.8 F | OXYGEN SATURATION: 97 % | DIASTOLIC BLOOD PRESSURE: 82 MMHG | WEIGHT: 152.2 LBS | HEART RATE: 83 BPM | SYSTOLIC BLOOD PRESSURE: 123 MMHG | HEIGHT: 65 IN

## 2024-08-09 DIAGNOSIS — M54.41 CHRONIC BILATERAL LOW BACK PAIN WITH BILATERAL SCIATICA: ICD-10-CM

## 2024-08-09 DIAGNOSIS — F41.1 GENERALIZED ANXIETY DISORDER: ICD-10-CM

## 2024-08-09 DIAGNOSIS — K21.9 GASTROESOPHAGEAL REFLUX DISEASE WITHOUT ESOPHAGITIS: ICD-10-CM

## 2024-08-09 DIAGNOSIS — G89.29 CHRONIC BILATERAL LOW BACK PAIN WITH BILATERAL SCIATICA: ICD-10-CM

## 2024-08-09 DIAGNOSIS — Z87.891 PERSONAL HISTORY OF TOBACCO USE, PRESENTING HAZARDS TO HEALTH: ICD-10-CM

## 2024-08-09 DIAGNOSIS — M54.42 CHRONIC BILATERAL LOW BACK PAIN WITH BILATERAL SCIATICA: ICD-10-CM

## 2024-08-09 DIAGNOSIS — E03.9 ACQUIRED HYPOTHYROIDISM: ICD-10-CM

## 2024-08-09 DIAGNOSIS — Z00.00 ANNUAL PHYSICAL EXAM: Primary | ICD-10-CM

## 2024-08-09 PROCEDURE — 99396 PREV VISIT EST AGE 40-64: CPT | Performed by: FAMILY MEDICINE

## 2024-08-09 RX ORDER — BUPROPION HYDROCHLORIDE 150 MG/1
150 TABLET ORAL DAILY
Qty: 90 TABLET | Refills: 1 | Status: SHIPPED | OUTPATIENT
Start: 2024-08-09

## 2024-08-09 NOTE — ASSESSMENT & PLAN NOTE
Stable on omeprazole 40 mg daily as needed.  Refills were not needed today.  Continue to monitor.  Wean PPI as able.

## 2024-08-09 NOTE — ASSESSMENT & PLAN NOTE
Stable on escitalopram 20 mg daily and bupropion 150 mg daily.  Discussed increasing Wellbutrin to 300 mg but she is happy with the current dose for now.  Refills were needed today.  Continue to monitor.

## 2024-08-09 NOTE — PROGRESS NOTES
"Chief Complaint  Annual Exam    Subjective          Gabriela Riggs presents to [facility] today for her annual physical.      She is UTD on colonoscopy, last done 7/2019 and this was normal.  Ten year repeat recommended.  Pap smear is no longer indicated by history; s/p hysterectomy.  She is UTD on mammogram, last done 2/2024 and this was normal.  She is due for low-dose CT.  She is UTD on Tdap (8/2021).  She is due for COVID, Shingrix.  Flu shot is not currently indicated.  She is due for routine labs including CMP, lipids and CBC.    She is on escitalopram and bupropion for anxiety disorder.  Mood has been \"better.\"  She is doing better.  Still having some cravings for cigarettes.  She is doing a smoking cessation program through her work as well.  She is down to a half pack per day.  She is planning to use nicotine patches.  She has had some more stress lately.  She is dating someone new.  She is back to koffi winston her grief counselor.        She is on gabapentin 100 mg BID for history of chronic low back pain.  Pain is well-controlled.  No adverse effects.     She is on omeprazole as needed for GERD.  Denies heartburn or indigestion.      She is no longer taking Synthroid for hypothyroidism.  We have been monitoring TSH and it has been WNL.    Review of Systems   Constitutional:  Negative for chills, fatigue and fever.   HENT:  Negative for congestion, hearing loss and rhinorrhea.    Eyes:  Negative for pain and visual disturbance.   Respiratory:  Negative for cough and shortness of breath.    Cardiovascular:  Negative for chest pain and palpitations.   Gastrointestinal:  Negative for abdominal pain, constipation, diarrhea, nausea and vomiting.   Genitourinary:  Negative for dysuria and hematuria.   Musculoskeletal:  Negative for arthralgias and myalgias.   Skin:  Negative for rash.   Neurological:  Negative for weakness and numbness.   Psychiatric/Behavioral:  Negative for " "dysphoric mood and sleep disturbance. The patient is not nervous/anxious.          Current Outpatient Medications:     albuterol sulfate  (90 Base) MCG/ACT inhaler, Inhale 2 puffs Every 6 (Six) Hours As Needed for Wheezing., Disp: 18 g, Rfl: 1    buPROPion XL (WELLBUTRIN XL) 150 MG 24 hr tablet, Take 1 tablet by mouth Daily., Disp: 90 tablet, Rfl: 1    escitalopram (LEXAPRO) 20 MG tablet, Take 1 tablet by mouth Daily., Disp: 90 tablet, Rfl: 1    gabapentin (NEURONTIN) 100 MG capsule, Take 1 capsule by mouth 2 (Two) Times a Day., Disp: 60 capsule, Rfl: 2    montelukast (Singulair) 10 MG tablet, Take 1 tablet by mouth Every Night., Disp: 90 tablet, Rfl: 3    omeprazole (priLOSEC) 40 MG capsule, Take 1 capsule by mouth Daily As Needed (reflux)., Disp: 30 capsule, Rfl: 5    Allergies:  Patient has no known allergies.      Objective   Vital Signs:   Vitals:    08/09/24 0829   BP: 123/82   Pulse: 83   Temp: 97.8 °F (36.6 °C)   TempSrc: Oral   SpO2: 97%  Comment: room air   Weight: 69 kg (152 lb 3.2 oz)   Height: 165.1 cm (65\")   Body mass index is 25.33 kg/m².        Physical Exam  Vitals reviewed.   Constitutional:       General: She is not in acute distress.     Appearance: Normal appearance. She is well-developed.   HENT:      Head: Normocephalic and atraumatic.      Right Ear: External ear normal.      Left Ear: External ear normal.      Mouth/Throat:      Mouth: Mucous membranes are moist.   Eyes:      Extraocular Movements: Extraocular movements intact.      Conjunctiva/sclera: Conjunctivae normal.      Pupils: Pupils are equal, round, and reactive to light.   Cardiovascular:      Rate and Rhythm: Normal rate and regular rhythm.      Heart sounds: No murmur heard.  Pulmonary:      Effort: Pulmonary effort is normal.      Breath sounds: Normal breath sounds. No wheezing, rhonchi or rales.   Abdominal:      General: Bowel sounds are normal. There is no distension.      Palpations: Abdomen is soft.      " Tenderness: There is no abdominal tenderness.   Musculoskeletal:         General: Normal range of motion.   Skin:     General: Skin is warm and dry.   Neurological:      Mental Status: She is alert and oriented to person, place, and time.      Deep Tendon Reflexes: Reflexes normal.   Psychiatric:         Mood and Affect: Mood and affect normal.         Behavior: Behavior normal.         Thought Content: Thought content normal.         Judgment: Judgment normal.        Lab Results   Component Value Date    GLUCOSE 90 06/15/2023    BUN 10 06/15/2023    CREATININE 0.76 06/15/2023    EGFR 96.2 06/15/2023    BCR 13.2 06/15/2023    K 4.6 06/15/2023    CO2 26.1 06/15/2023    CALCIUM 9.9 06/15/2023    ALBUMIN 4.3 06/15/2023    BILITOT <0.2 06/15/2023    AST 18 06/15/2023    ALT 12 06/15/2023       Lab Results   Component Value Date    CHOL 223 (H) 06/15/2023    CHLPL 195 02/13/2020    TRIG 127 06/15/2023    HDL 60 06/15/2023     (H) 06/15/2023       Lab Results   Component Value Date    WBC 9.30 06/15/2023    HGB 14.0 06/15/2023    HCT 43.0 06/15/2023    MCV 93.9 06/15/2023     06/15/2023          Assessment and Plan    Assessment & Plan  Annual physical exam  She is UTD on colonoscopy, last done 7/2019 and this was normal.  Ten year repeat recommended.  Pap smear is no longer indicated by history; s/p hysterectomy.  She is UTD on mammogram, last done 2/2024 and this was normal.  She is due for low-dose CT; ordered.  She is UTD on Tdap (8/2021).  She is due for COVID, Shingrix.  Shingrix can be done at the pharmacy.  COVID declined. Flu shot is not currently indicated.  She is due for routine labs including CMP, lipids and CBC; ordered.  Chronic bilateral low back pain with bilateral sciatica  Stable on current regimen.  Symptoms are well controlled.  No adverse effects. She does require ongoing use of this controlled substance to function.  Tox screen was not due today.  Prior tox screen appropriate.  LEONORA  was run today.  Refills were not needed today.  RTC 6 months.   Acquired hypothyroidism  Not currently on medication.  Labs were due today.  Continue to monitor.   Generalized anxiety disorder   Stable on escitalopram 20 mg daily and bupropion 150 mg daily.  Discussed increasing Wellbutrin to 300 mg but she is happy with the current dose for now.  Refills were needed today.  Continue to monitor.   Gastroesophageal reflux disease without esophagitis  Stable on omeprazole 40 mg daily as needed.  Refills were not needed today.  Continue to monitor.  Wean PPI as able.   Personal history of tobacco use, presenting hazards to health      Orders Placed This Encounter   Procedures     CT Chest Low Dose Cancer Screening WO    Lipid Panel    Comprehensive Metabolic Panel    CBC Auto Differential    TSH     New Medications Ordered This Visit   Medications    buPROPion XL (WELLBUTRIN XL) 150 MG 24 hr tablet     Sig: Take 1 tablet by mouth Daily.     Dispense:  90 tablet     Refill:  1       Gabriela Riggs  reports that she has been smoking cigarettes. She started smoking about 21 years ago. She has a 21.6 pack-year smoking history. She has never used smokeless tobacco. I have educated her on the risk of diseases from using tobacco products such as cancer, COPD, and heart disease.     I advised her to quit and she is willing to quit. We have discussed the following method/s for tobacco cessation:  Counseling.  Together we have set a quit date for  October 1 .  She will follow up with me in 6 months or sooner to check on her progress.    I spent  2  minutes counseling the patient.            Follow Up   Return in about 6 months (around 2/9/2025) for Recheck.  Patient was given instructions and counseling regarding her condition or for health maintenance advice. Please see specific information pulled into the AVS if appropriate.

## 2024-08-15 DIAGNOSIS — K21.9 GASTROESOPHAGEAL REFLUX DISEASE WITHOUT ESOPHAGITIS: ICD-10-CM

## 2024-08-15 RX ORDER — OMEPRAZOLE 40 MG/1
40 CAPSULE, DELAYED RELEASE ORAL DAILY PRN
Qty: 30 CAPSULE | Refills: 5 | Status: SHIPPED | OUTPATIENT
Start: 2024-08-15

## 2024-08-27 ENCOUNTER — HOSPITAL ENCOUNTER (OUTPATIENT)
Dept: CT IMAGING | Facility: HOSPITAL | Age: 49
Discharge: HOME | End: 2024-08-27
Admitting: FAMILY MEDICINE
Payer: COMMERCIAL

## 2024-08-27 DIAGNOSIS — Z87.891 PERSONAL HISTORY OF TOBACCO USE, PRESENTING HAZARDS TO HEALTH: ICD-10-CM

## 2024-08-27 PROCEDURE — 71271 CT THORAX LUNG CANCER SCR C-: CPT

## 2024-09-13 ENCOUNTER — TELEPHONE (OUTPATIENT)
Dept: FAMILY MEDICINE CLINIC | Age: 49
End: 2024-09-13
Payer: COMMERCIAL

## 2024-09-18 DIAGNOSIS — M54.41 CHRONIC BILATERAL LOW BACK PAIN WITH BILATERAL SCIATICA: ICD-10-CM

## 2024-09-18 DIAGNOSIS — G89.29 CHRONIC BILATERAL LOW BACK PAIN WITH BILATERAL SCIATICA: ICD-10-CM

## 2024-09-18 DIAGNOSIS — M54.42 CHRONIC BILATERAL LOW BACK PAIN WITH BILATERAL SCIATICA: ICD-10-CM

## 2024-09-18 DIAGNOSIS — F41.9 ANXIETY: ICD-10-CM

## 2024-09-18 RX ORDER — GABAPENTIN 100 MG/1
100 CAPSULE ORAL 2 TIMES DAILY
Qty: 60 CAPSULE | Refills: 2 | Status: CANCELLED | OUTPATIENT
Start: 2024-09-18

## 2024-09-18 RX ORDER — ESCITALOPRAM OXALATE 20 MG/1
20 TABLET ORAL DAILY
Qty: 90 TABLET | Refills: 1 | Status: SHIPPED | OUTPATIENT
Start: 2024-09-18

## 2024-09-18 RX ORDER — GABAPENTIN 100 MG/1
100 CAPSULE ORAL 2 TIMES DAILY
Qty: 60 CAPSULE | Refills: 2 | Status: SHIPPED | OUTPATIENT
Start: 2024-09-18

## 2024-10-04 ENCOUNTER — TELEPHONE (OUTPATIENT)
Dept: FAMILY MEDICINE CLINIC | Age: 49
End: 2024-10-04
Payer: COMMERCIAL

## 2024-10-04 NOTE — TELEPHONE ENCOUNTER
Caller: Gabriela Riggs    Relationship to patient: Self    Best call back number: [phone]    Chief complaint: UTI    Type of visit: SAME DAY    Requested date: TODAY    If rescheduling, when is the original appointment:     Additional notes:PATIENT IS REQUESTING A CALL BACK TO SCHEDULE AN APPOINTMENT FOR UTI SYMPTOMS. NO APPOINTMENTS AVAILABLE FOR HUB TO SCHEDULE AND WARM TRANSFER UNSUCCESSFUL.

## 2024-10-08 ENCOUNTER — TELEPHONE (OUTPATIENT)
Dept: URGENT CARE | Facility: CLINIC | Age: 49
End: 2024-10-08
Payer: COMMERCIAL

## 2024-10-08 NOTE — TELEPHONE ENCOUNTER
Called patient, line rang, no answer, left voicemail to call back at earliest convenience regarding test results. This is the first documented attempt to reach this patient regarding her test results.     -John Cooksey, PA-C

## 2024-12-11 DIAGNOSIS — G89.29 CHRONIC BILATERAL LOW BACK PAIN WITH BILATERAL SCIATICA: ICD-10-CM

## 2024-12-11 DIAGNOSIS — M54.42 CHRONIC BILATERAL LOW BACK PAIN WITH BILATERAL SCIATICA: ICD-10-CM

## 2024-12-11 DIAGNOSIS — M54.41 CHRONIC BILATERAL LOW BACK PAIN WITH BILATERAL SCIATICA: ICD-10-CM

## 2024-12-11 RX ORDER — GABAPENTIN 100 MG/1
100 CAPSULE ORAL 2 TIMES DAILY
Qty: 60 CAPSULE | Refills: 2 | Status: SHIPPED | OUTPATIENT
Start: 2024-12-11

## 2025-02-12 ENCOUNTER — OFFICE VISIT (OUTPATIENT)
Dept: FAMILY MEDICINE CLINIC | Age: 50
End: 2025-02-12
Payer: COMMERCIAL

## 2025-02-12 VITALS
WEIGHT: 156.6 LBS | DIASTOLIC BLOOD PRESSURE: 75 MMHG | HEART RATE: 83 BPM | HEIGHT: 65 IN | OXYGEN SATURATION: 97 % | TEMPERATURE: 98.4 F | BODY MASS INDEX: 26.09 KG/M2 | SYSTOLIC BLOOD PRESSURE: 119 MMHG

## 2025-02-12 DIAGNOSIS — Z79.899 HIGH RISK MEDICATION USE: ICD-10-CM

## 2025-02-12 DIAGNOSIS — M54.42 CHRONIC BILATERAL LOW BACK PAIN WITH BILATERAL SCIATICA: ICD-10-CM

## 2025-02-12 DIAGNOSIS — E03.9 ACQUIRED HYPOTHYROIDISM: ICD-10-CM

## 2025-02-12 DIAGNOSIS — G89.29 CHRONIC BILATERAL LOW BACK PAIN WITH BILATERAL SCIATICA: ICD-10-CM

## 2025-02-12 DIAGNOSIS — Z13.6 ENCOUNTER FOR SCREENING FOR CARDIOVASCULAR DISORDERS: ICD-10-CM

## 2025-02-12 DIAGNOSIS — Z12.31 SCREENING MAMMOGRAM FOR BREAST CANCER: ICD-10-CM

## 2025-02-12 DIAGNOSIS — F41.1 GENERALIZED ANXIETY DISORDER: Primary | ICD-10-CM

## 2025-02-12 DIAGNOSIS — K21.9 GASTROESOPHAGEAL REFLUX DISEASE WITHOUT ESOPHAGITIS: ICD-10-CM

## 2025-02-12 DIAGNOSIS — Z23 ENCOUNTER FOR IMMUNIZATION: ICD-10-CM

## 2025-02-12 DIAGNOSIS — Z72.0 TOBACCO ABUSE: ICD-10-CM

## 2025-02-12 DIAGNOSIS — M54.41 CHRONIC BILATERAL LOW BACK PAIN WITH BILATERAL SCIATICA: ICD-10-CM

## 2025-02-12 PROCEDURE — 99214 OFFICE O/P EST MOD 30 MIN: CPT | Performed by: FAMILY MEDICINE

## 2025-02-12 PROCEDURE — 90472 IMMUNIZATION ADMIN EACH ADD: CPT | Performed by: FAMILY MEDICINE

## 2025-02-12 PROCEDURE — 90471 IMMUNIZATION ADMIN: CPT | Performed by: FAMILY MEDICINE

## 2025-02-12 PROCEDURE — 90656 IIV3 VACC NO PRSV 0.5 ML IM: CPT | Performed by: FAMILY MEDICINE

## 2025-02-12 PROCEDURE — 90677 PCV20 VACCINE IM: CPT | Performed by: FAMILY MEDICINE

## 2025-02-12 PROCEDURE — 99406 BEHAV CHNG SMOKING 3-10 MIN: CPT | Performed by: FAMILY MEDICINE

## 2025-02-12 RX ORDER — OMEPRAZOLE 40 MG/1
40 CAPSULE, DELAYED RELEASE ORAL DAILY PRN
Qty: 90 CAPSULE | Refills: 1 | Status: SHIPPED | OUTPATIENT
Start: 2025-02-12

## 2025-02-12 RX ORDER — BUPROPION HYDROCHLORIDE 150 MG/1
150 TABLET ORAL DAILY
Qty: 90 TABLET | Refills: 1 | Status: SHIPPED | OUTPATIENT
Start: 2025-02-12

## 2025-02-12 RX ORDER — ESCITALOPRAM OXALATE 20 MG/1
20 TABLET ORAL DAILY
Qty: 90 TABLET | Refills: 1 | Status: SHIPPED | OUTPATIENT
Start: 2025-02-12

## 2025-02-12 NOTE — ASSESSMENT & PLAN NOTE
Gabriela Riggs  reports that she has been smoking cigarettes. She started smoking about 22 years ago. She has a 22.1 pack-year smoking history. She has never used smokeless tobacco. I have educated her on the risk of diseases from using tobacco products such as cancer, COPD, and heart disease.     I advised her to quit and she is willing to quit. We have discussed the following method/s for tobacco cessation:  Counseling and Prescription Medication.  Together we have set a quit date for May 1st, 2025.  She will follow up with me in 6 months or sooner to check on her progress.    I spent 5 minutes counseling the patient.

## 2025-02-12 NOTE — ASSESSMENT & PLAN NOTE
Stable on omeprazole 40 mg daily.  Refills were needed today.  Continue to monitor.  Wean PPI as able.  Orders:    omeprazole (priLOSEC) 40 MG capsule; Take 1 capsule by mouth Daily As Needed (reflux).

## 2025-02-12 NOTE — ASSESSMENT & PLAN NOTE
Stable on escitalopram 20 mg daily and bupropion 150 mg daily.  She is working with counselor and that is going well.   Refills were needed today.  Continue to monitor.  Orders:    buPROPion XL (WELLBUTRIN XL) 150 MG 24 hr tablet; Take 1 tablet by mouth Daily.    escitalopram (LEXAPRO) 20 MG tablet; Take 1 tablet by mouth Daily.

## 2025-02-12 NOTE — PROGRESS NOTES
"Chief Complaint  Anxiety (6 month )    Subjective          Gabriela Riggs presents to [facility] today for routine f/u of chronic issues.    She is on bupropion and escitalopram for anxiety.  Mood has been \"good.\"  She has also been using the bupropion to cut down on her smoking.  It's going \"not so good.\"  She has been dating and that was tough, as his kids did not approve their relationship.  She is seeing a counselor and that has been very helpful.  She talked with her about the smoking recently.  She is ready to try again.  This will be her 4th attempt to quit smoking.        She is on gabapentin for chronic low back pain.  Pain is well-controlled on this regimen.  No adverse effects.  She is due for tox screen today.     She is on omeprazole as needed for GERD.  No reflux or heartburn.      She is not currently on medication for hypothyroidism.  She is due for TSH, last done 6/2023 at which time this was normal.      Current Outpatient Medications:     albuterol sulfate  (90 Base) MCG/ACT inhaler, Inhale 2 puffs Every 6 (Six) Hours As Needed for Wheezing., Disp: 18 g, Rfl: 1    buPROPion XL (WELLBUTRIN XL) 150 MG 24 hr tablet, Take 1 tablet by mouth Daily., Disp: 90 tablet, Rfl: 1    escitalopram (LEXAPRO) 20 MG tablet, Take 1 tablet by mouth Daily., Disp: 90 tablet, Rfl: 1    gabapentin (NEURONTIN) 100 MG capsule, Take 1 capsule by mouth 2 (Two) Times a Day., Disp: 60 capsule, Rfl: 2    montelukast (Singulair) 10 MG tablet, Take 1 tablet by mouth Every Night., Disp: 90 tablet, Rfl: 3    omeprazole (priLOSEC) 40 MG capsule, Take 1 capsule by mouth Daily As Needed (reflux)., Disp: 90 capsule, Rfl: 1  Medications Discontinued During This Encounter   Medication Reason    buPROPion XL (WELLBUTRIN XL) 150 MG 24 hr tablet Reorder    omeprazole (priLOSEC) 40 MG capsule Reorder    escitalopram (LEXAPRO) 20 MG tablet Reorder         Allergies:  Patient has no known " "allergies.      Objective   Vital Signs:   Vitals:    02/12/25 0759   BP: 119/75   Pulse: 83   Temp: 98.4 °F (36.9 °C)   TempSrc: Oral   SpO2: 97%  Comment: room air   Weight: 71 kg (156 lb 9.6 oz)   Height: 165.1 cm (65\")     Body mass index is 26.06 kg/m².           Physical Exam  Vitals reviewed.   Constitutional:       General: She is not in acute distress.     Appearance: Normal appearance. She is well-developed.   HENT:      Head: Normocephalic and atraumatic.      Right Ear: External ear normal.      Left Ear: External ear normal.   Eyes:      Extraocular Movements: Extraocular movements intact.      Conjunctiva/sclera: Conjunctivae normal.      Pupils: Pupils are equal, round, and reactive to light.   Cardiovascular:      Rate and Rhythm: Normal rate and regular rhythm.      Heart sounds: No murmur heard.  Pulmonary:      Effort: Pulmonary effort is normal.      Breath sounds: Normal breath sounds. No wheezing, rhonchi or rales.   Abdominal:      General: Bowel sounds are normal. There is no distension.      Palpations: Abdomen is soft.      Tenderness: There is no abdominal tenderness.   Musculoskeletal:         General: Normal range of motion.   Neurological:      Mental Status: She is alert.   Psychiatric:         Mood and Affect: Affect normal.           Lab Results   Component Value Date    GLUCOSE 90 06/15/2023    BUN 10 06/15/2023    CREATININE 0.76 06/15/2023    EGFRIFNONA 92 08/25/2019    BCR 13.2 06/15/2023    K 4.6 06/15/2023    CO2 26.1 06/15/2023    CALCIUM 9.9 06/15/2023    ALBUMIN 4.3 06/15/2023    LABIL2 1.4 04/13/2021    AST 18 06/15/2023    ALT 12 06/15/2023       Lab Results   Component Value Date    CHOL 223 (H) 06/15/2023    CHLPL 195 02/13/2020    TRIG 127 06/15/2023    HDL 60 06/15/2023     (H) 06/15/2023       Lab Results   Component Value Date    WBC 9.30 06/15/2023    HGB 14.0 06/15/2023    HCT 43.0 06/15/2023    MCV 93.9 06/15/2023     06/15/2023            Assessment " and Plan    Assessment & Plan  Generalized anxiety disorder    Stable on escitalopram 20 mg daily and bupropion 150 mg daily.  She is working with counselor and that is going well.   Refills were needed today.  Continue to monitor.  Orders:    buPROPion XL (WELLBUTRIN XL) 150 MG 24 hr tablet; Take 1 tablet by mouth Daily.    escitalopram (LEXAPRO) 20 MG tablet; Take 1 tablet by mouth Daily.    Chronic bilateral low back pain with bilateral sciatica  Stable on current regimen.  Symptoms are well controlled.  No adverse effects. She does require ongoing use of this controlled substance to function.  Tox screen was due today.  Prior tox screen appropriate.  LEONORA was run today.  Refills were not needed today.  RTC 6 months.       High risk medication use    Orders:    POC Medline 12 Panel Urine Drug Screen    Gastroesophageal reflux disease without esophagitis  Stable on omeprazole 40 mg daily.  Refills were needed today.  Continue to monitor.  Wean PPI as able.  Orders:    omeprazole (priLOSEC) 40 MG capsule; Take 1 capsule by mouth Daily As Needed (reflux).    Acquired hypothyroidism  Checking labs.  Orders:    Comprehensive Metabolic Panel; Future    TSH; Future    Encounter for screening for cardiovascular disorders    Orders:    Lipid Panel; Future    Screening mammogram for breast cancer    Orders:    Mammo Screening Digital Tomosynthesis Bilateral With CAD; Future    Encounter for immunization    Orders:    Fluzone >6mos    Pneumococcal Conjugate Vaccine 20-Valent All    Tobacco abuse  Gabriela Riggs  reports that she has been smoking cigarettes. She started smoking about 22 years ago. She has a 22.1 pack-year smoking history. She has never used smokeless tobacco. I have educated her on the risk of diseases from using tobacco products such as cancer, COPD, and heart disease.     I advised her to quit and she is willing to quit. We have discussed the following method/s for tobacco cessation:  Counseling and  Prescription Medication.  Together we have set a quit date for  May 1st, 2025 .  She will follow up with me in 6 months or sooner to check on her progress.    I spent  5  minutes counseling the patient.                         Follow Up   Return in about 6 months (around 8/12/2025) for Annual physical.  Patient was given instructions and counseling regarding her condition or for health maintenance advice. Please see specific information pulled into the AVS if appropriate.           02/12/2025

## 2025-03-04 ENCOUNTER — TELEPHONE (OUTPATIENT)
Dept: FAMILY MEDICINE CLINIC | Age: 50
End: 2025-03-04
Payer: COMMERCIAL

## 2025-03-12 DIAGNOSIS — M54.41 CHRONIC BILATERAL LOW BACK PAIN WITH BILATERAL SCIATICA: ICD-10-CM

## 2025-03-12 DIAGNOSIS — G89.29 CHRONIC BILATERAL LOW BACK PAIN WITH BILATERAL SCIATICA: ICD-10-CM

## 2025-03-12 DIAGNOSIS — M54.42 CHRONIC BILATERAL LOW BACK PAIN WITH BILATERAL SCIATICA: ICD-10-CM

## 2025-03-12 RX ORDER — GABAPENTIN 100 MG/1
100 CAPSULE ORAL 2 TIMES DAILY
Qty: 60 CAPSULE | Refills: 2 | Status: SHIPPED | OUTPATIENT
Start: 2025-03-12

## 2025-03-17 ENCOUNTER — LAB (OUTPATIENT)
Dept: LAB | Facility: HOSPITAL | Age: 50
End: 2025-03-17
Payer: COMMERCIAL

## 2025-03-17 DIAGNOSIS — E03.9 ACQUIRED HYPOTHYROIDISM: ICD-10-CM

## 2025-03-17 DIAGNOSIS — Z00.00 ANNUAL PHYSICAL EXAM: ICD-10-CM

## 2025-03-17 DIAGNOSIS — Z13.6 ENCOUNTER FOR SCREENING FOR CARDIOVASCULAR DISORDERS: ICD-10-CM

## 2025-03-17 LAB
ALBUMIN SERPL-MCNC: 3.9 G/DL (ref 3.5–5.2)
ALBUMIN/GLOB SERPL: 1.4 G/DL
ALP SERPL-CCNC: 75 U/L (ref 39–117)
ALT SERPL W P-5'-P-CCNC: 12 U/L (ref 1–33)
ANION GAP SERPL CALCULATED.3IONS-SCNC: 8.5 MMOL/L (ref 5–15)
AST SERPL-CCNC: 16 U/L (ref 1–32)
BASOPHILS # BLD AUTO: 0.04 10*3/MM3 (ref 0–0.2)
BASOPHILS NFR BLD AUTO: 0.5 % (ref 0–1.5)
BILIRUB SERPL-MCNC: 0.2 MG/DL (ref 0–1.2)
BUN SERPL-MCNC: 7 MG/DL (ref 6–20)
BUN/CREAT SERPL: 9.3 (ref 7–25)
CALCIUM SPEC-SCNC: 9.2 MG/DL (ref 8.6–10.5)
CHLORIDE SERPL-SCNC: 104 MMOL/L (ref 98–107)
CHOLEST SERPL-MCNC: 208 MG/DL (ref 0–200)
CO2 SERPL-SCNC: 26.5 MMOL/L (ref 22–29)
CREAT SERPL-MCNC: 0.75 MG/DL (ref 0.57–1)
DEPRECATED RDW RBC AUTO: 47.3 FL (ref 37–54)
EGFRCR SERPLBLD CKD-EPI 2021: 96.5 ML/MIN/1.73
EOSINOPHIL # BLD AUTO: 0.52 10*3/MM3 (ref 0–0.4)
EOSINOPHIL NFR BLD AUTO: 6.2 % (ref 0.3–6.2)
ERYTHROCYTE [DISTWIDTH] IN BLOOD BY AUTOMATED COUNT: 13.1 % (ref 12.3–15.4)
GLOBULIN UR ELPH-MCNC: 2.7 GM/DL
GLUCOSE SERPL-MCNC: 92 MG/DL (ref 65–99)
HCT VFR BLD AUTO: 40.8 % (ref 34–46.6)
HDLC SERPL-MCNC: 66 MG/DL (ref 40–60)
HGB BLD-MCNC: 13.3 G/DL (ref 12–15.9)
IMM GRANULOCYTES # BLD AUTO: 0.01 10*3/MM3 (ref 0–0.05)
IMM GRANULOCYTES NFR BLD AUTO: 0.1 % (ref 0–0.5)
LDLC SERPL CALC-MCNC: 131 MG/DL (ref 0–100)
LDLC/HDLC SERPL: 1.97 {RATIO}
LYMPHOCYTES # BLD AUTO: 3.12 10*3/MM3 (ref 0.7–3.1)
LYMPHOCYTES NFR BLD AUTO: 37.4 % (ref 19.6–45.3)
MCH RBC QN AUTO: 31 PG (ref 26.6–33)
MCHC RBC AUTO-ENTMCNC: 32.6 G/DL (ref 31.5–35.7)
MCV RBC AUTO: 95.1 FL (ref 79–97)
MONOCYTES # BLD AUTO: 0.85 10*3/MM3 (ref 0.1–0.9)
MONOCYTES NFR BLD AUTO: 10.2 % (ref 5–12)
NEUTROPHILS NFR BLD AUTO: 3.81 10*3/MM3 (ref 1.7–7)
NEUTROPHILS NFR BLD AUTO: 45.6 % (ref 42.7–76)
PLATELET # BLD AUTO: 284 10*3/MM3 (ref 140–450)
PMV BLD AUTO: 11.1 FL (ref 6–12)
POTASSIUM SERPL-SCNC: 4.1 MMOL/L (ref 3.5–5.2)
PROT SERPL-MCNC: 6.6 G/DL (ref 6–8.5)
RBC # BLD AUTO: 4.29 10*6/MM3 (ref 3.77–5.28)
SODIUM SERPL-SCNC: 139 MMOL/L (ref 136–145)
TRIGL SERPL-MCNC: 61 MG/DL (ref 0–150)
TSH SERPL DL<=0.05 MIU/L-ACNC: 1.35 UIU/ML (ref 0.27–4.2)
VLDLC SERPL-MCNC: 11 MG/DL (ref 5–40)
WBC NRBC COR # BLD AUTO: 8.35 10*3/MM3 (ref 3.4–10.8)

## 2025-03-17 PROCEDURE — 80061 LIPID PANEL: CPT

## 2025-03-17 PROCEDURE — 36415 COLL VENOUS BLD VENIPUNCTURE: CPT

## 2025-03-17 PROCEDURE — 80050 GENERAL HEALTH PANEL: CPT

## 2025-03-18 ENCOUNTER — HOSPITAL ENCOUNTER (OUTPATIENT)
Dept: MAMMOGRAPHY | Facility: HOSPITAL | Age: 50
Discharge: HOME | End: 2025-03-18
Admitting: FAMILY MEDICINE
Payer: COMMERCIAL

## 2025-03-18 DIAGNOSIS — Z12.31 SCREENING MAMMOGRAM FOR BREAST CANCER: ICD-10-CM

## 2025-03-18 PROCEDURE — 77067 SCR MAMMO BI INCL CAD: CPT

## 2025-03-18 PROCEDURE — 77063 BREAST TOMOSYNTHESIS BI: CPT

## 2025-03-19 ENCOUNTER — RESULTS FOLLOW-UP (OUTPATIENT)
Dept: MAMMOGRAPHY | Facility: HOSPITAL | Age: 50
End: 2025-03-19
Payer: COMMERCIAL

## 2025-05-12 DIAGNOSIS — F41.1 GENERALIZED ANXIETY DISORDER: ICD-10-CM

## 2025-05-12 RX ORDER — ESCITALOPRAM OXALATE 20 MG/1
20 TABLET ORAL DAILY
Qty: 90 TABLET | Refills: 1 | OUTPATIENT
Start: 2025-05-12

## 2025-06-06 ENCOUNTER — APPOINTMENT (OUTPATIENT)
Dept: CT IMAGING | Facility: HOSPITAL | Age: 50
End: 2025-06-06
Payer: COMMERCIAL

## 2025-06-06 ENCOUNTER — HOSPITAL ENCOUNTER (EMERGENCY)
Facility: HOSPITAL | Age: 50
Discharge: HOME | End: 2025-06-06
Attending: EMERGENCY MEDICINE
Payer: COMMERCIAL

## 2025-06-06 VITALS
OXYGEN SATURATION: 96 % | HEIGHT: 66 IN | TEMPERATURE: 98.3 F | HEART RATE: 79 BPM | BODY MASS INDEX: 25.07 KG/M2 | WEIGHT: 156 LBS | SYSTOLIC BLOOD PRESSURE: 123 MMHG | DIASTOLIC BLOOD PRESSURE: 76 MMHG | RESPIRATION RATE: 14 BRPM

## 2025-06-06 DIAGNOSIS — R42 VERTIGO: Primary | ICD-10-CM

## 2025-06-06 PROCEDURE — 70450 CT HEAD/BRAIN W/O DYE: CPT

## 2025-06-06 PROCEDURE — 99284 EMERGENCY DEPT VISIT MOD MDM: CPT

## 2025-06-06 PROCEDURE — 63710000001 ONDANSETRON ODT 4 MG TABLET DISPERSIBLE: Performed by: EMERGENCY MEDICINE

## 2025-06-06 RX ORDER — ONDANSETRON 4 MG/1
4 TABLET, ORALLY DISINTEGRATING ORAL ONCE
Status: COMPLETED | OUTPATIENT
Start: 2025-06-06 | End: 2025-06-06

## 2025-06-06 RX ORDER — MECLIZINE HYDROCHLORIDE 25 MG/1
25 TABLET ORAL ONCE
Status: COMPLETED | OUTPATIENT
Start: 2025-06-06 | End: 2025-06-06

## 2025-06-06 RX ORDER — ONDANSETRON 4 MG/1
4 TABLET, ORALLY DISINTEGRATING ORAL 4 TIMES DAILY PRN
Qty: 12 TABLET | Refills: 0 | Status: SHIPPED | OUTPATIENT
Start: 2025-06-06

## 2025-06-06 RX ORDER — MECLIZINE HYDROCHLORIDE 25 MG/1
25 TABLET ORAL 3 TIMES DAILY PRN
Qty: 21 TABLET | Refills: 0 | Status: SHIPPED | OUTPATIENT
Start: 2025-06-06

## 2025-06-06 RX ADMIN — MECLIZINE HYDROCHLORIDE 25 MG: 25 TABLET ORAL at 21:49

## 2025-06-06 RX ADMIN — ONDANSETRON 4 MG: 4 TABLET, ORALLY DISINTEGRATING ORAL at 21:49

## 2025-06-07 NOTE — DISCHARGE INSTRUCTIONS
Take medication as prescribed.  Follow-up with your primary care doctor for further evaluation.  Return immediately for any worsening dizziness, severe headache, uncontrolled nausea or vomiting.
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
## 2025-06-23 DIAGNOSIS — M54.41 CHRONIC BILATERAL LOW BACK PAIN WITH BILATERAL SCIATICA: ICD-10-CM

## 2025-06-23 DIAGNOSIS — F41.1 GENERALIZED ANXIETY DISORDER: ICD-10-CM

## 2025-06-23 DIAGNOSIS — G89.29 CHRONIC BILATERAL LOW BACK PAIN WITH BILATERAL SCIATICA: ICD-10-CM

## 2025-06-23 DIAGNOSIS — M54.42 CHRONIC BILATERAL LOW BACK PAIN WITH BILATERAL SCIATICA: ICD-10-CM

## 2025-06-23 NOTE — TELEPHONE ENCOUNTER
Controlled refill request:  Requested Prescriptions     Pending Prescriptions Disp Refills    gabapentin (NEURONTIN) 100 MG capsule [Pharmacy Med Name: gabapentin 100 mg capsule] 60 capsule 2     Sig: Take 1 capsule by mouth 2 (Two) Times a Day.    escitalopram (LEXAPRO) 20 MG tablet [Pharmacy Med Name: escitalopram 20 mg tablet] 90 tablet 2     Sig: Take 1 tablet by mouth Daily.      Last OV:  2/12/2025  Next OV:  8/15/2025  Last fill:  3/12/25 with 2 refills  Last tox:  2/12/25

## 2025-06-24 RX ORDER — GABAPENTIN 100 MG/1
100 CAPSULE ORAL 2 TIMES DAILY
Qty: 60 CAPSULE | Refills: 2 | Status: SHIPPED | OUTPATIENT
Start: 2025-06-24

## 2025-06-24 RX ORDER — ESCITALOPRAM OXALATE 20 MG/1
20 TABLET ORAL DAILY
Qty: 90 TABLET | Refills: 2 | Status: SHIPPED | OUTPATIENT
Start: 2025-06-24

## 2025-08-15 ENCOUNTER — TELEPHONE (OUTPATIENT)
Dept: FAMILY MEDICINE CLINIC | Age: 50
End: 2025-08-15

## 2025-08-19 ENCOUNTER — TELEPHONE (OUTPATIENT)
Dept: ADMINISTRATIVE | Facility: OTHER | Age: 50
End: 2025-08-19
Payer: COMMERCIAL

## 2025-08-28 ENCOUNTER — TELEPHONE (OUTPATIENT)
Dept: FAMILY MEDICINE CLINIC | Age: 50
End: 2025-08-28
Payer: COMMERCIAL

## 2025-08-28 DIAGNOSIS — Z12.2 SCREENING FOR LUNG CANCER: Primary | ICD-10-CM
